# Patient Record
Sex: FEMALE | Race: BLACK OR AFRICAN AMERICAN | NOT HISPANIC OR LATINO | Employment: OTHER | URBAN - METROPOLITAN AREA
[De-identification: names, ages, dates, MRNs, and addresses within clinical notes are randomized per-mention and may not be internally consistent; named-entity substitution may affect disease eponyms.]

---

## 2017-03-14 ENCOUNTER — HOSPITAL ENCOUNTER (EMERGENCY)
Facility: HOSPITAL | Age: 62
Discharge: HOME | End: 2017-03-14
Attending: EMERGENCY MEDICINE
Payer: COMMERCIAL

## 2017-03-14 VITALS
DIASTOLIC BLOOD PRESSURE: 67 MMHG | OXYGEN SATURATION: 100 % | BODY MASS INDEX: 21.19 KG/M2 | WEIGHT: 135 LBS | HEIGHT: 67 IN | RESPIRATION RATE: 16 BRPM | SYSTOLIC BLOOD PRESSURE: 133 MMHG | HEART RATE: 70 BPM | TEMPERATURE: 98 F

## 2017-03-14 DIAGNOSIS — K21.9 GASTROESOPHAGEAL REFLUX DISEASE, ESOPHAGITIS PRESENCE NOT SPECIFIED: ICD-10-CM

## 2017-03-14 DIAGNOSIS — R07.89 ATYPICAL CHEST PAIN: Primary | ICD-10-CM

## 2017-03-14 LAB
ALBUMIN SERPL BCP-MCNC: 4.1 G/DL
ALP SERPL-CCNC: 87 U/L
ALT SERPL W/O P-5'-P-CCNC: 9 U/L
ANION GAP SERPL CALC-SCNC: 11 MMOL/L
AST SERPL-CCNC: 13 U/L
BASOPHILS # BLD AUTO: 0.01 K/UL
BASOPHILS NFR BLD: 0.2 %
BILIRUB SERPL-MCNC: 0.5 MG/DL
BNP SERPL-MCNC: 30 PG/ML
BUN SERPL-MCNC: 19 MG/DL
CALCIUM SERPL-MCNC: 10.1 MG/DL
CHLORIDE SERPL-SCNC: 106 MMOL/L
CO2 SERPL-SCNC: 23 MMOL/L
CREAT SERPL-MCNC: 1.1 MG/DL
DIFFERENTIAL METHOD: ABNORMAL
EOSINOPHIL # BLD AUTO: 0.1 K/UL
EOSINOPHIL NFR BLD: 1.1 %
ERYTHROCYTE [DISTWIDTH] IN BLOOD BY AUTOMATED COUNT: 12.9 %
EST. GFR  (AFRICAN AMERICAN): >60 ML/MIN/1.73 M^2
EST. GFR  (NON AFRICAN AMERICAN): 54 ML/MIN/1.73 M^2
GLUCOSE SERPL-MCNC: 209 MG/DL
HCT VFR BLD AUTO: 31 %
HGB BLD-MCNC: 10 G/DL
INR PPP: 1
LYMPHOCYTES # BLD AUTO: 2 K/UL
LYMPHOCYTES NFR BLD: 32.5 %
MCH RBC QN AUTO: 27 PG
MCHC RBC AUTO-ENTMCNC: 32.3 %
MCV RBC AUTO: 84 FL
MONOCYTES # BLD AUTO: 0.5 K/UL
MONOCYTES NFR BLD: 8.2 %
NEUTROPHILS # BLD AUTO: 3.6 K/UL
NEUTROPHILS NFR BLD: 58 %
PLATELET # BLD AUTO: 310 K/UL
PMV BLD AUTO: 9.7 FL
POTASSIUM SERPL-SCNC: 4.2 MMOL/L
PROT SERPL-MCNC: 7.9 G/DL
PROTHROMBIN TIME: 10.3 SEC
RBC # BLD AUTO: 3.71 M/UL
SODIUM SERPL-SCNC: 140 MMOL/L
TROPONIN I SERPL DL<=0.01 NG/ML-MCNC: 0.01 NG/ML
WBC # BLD AUTO: 6.13 K/UL

## 2017-03-14 PROCEDURE — 63600175 PHARM REV CODE 636 W HCPCS: Performed by: EMERGENCY MEDICINE

## 2017-03-14 PROCEDURE — 25000003 PHARM REV CODE 250: Performed by: EMERGENCY MEDICINE

## 2017-03-14 PROCEDURE — 96374 THER/PROPH/DIAG INJ IV PUSH: CPT

## 2017-03-14 PROCEDURE — 85025 COMPLETE CBC W/AUTO DIFF WBC: CPT

## 2017-03-14 PROCEDURE — 93005 ELECTROCARDIOGRAM TRACING: CPT

## 2017-03-14 PROCEDURE — 99284 EMERGENCY DEPT VISIT MOD MDM: CPT | Mod: 25

## 2017-03-14 PROCEDURE — C9113 INJ PANTOPRAZOLE SODIUM, VIA: HCPCS | Performed by: EMERGENCY MEDICINE

## 2017-03-14 PROCEDURE — 85610 PROTHROMBIN TIME: CPT

## 2017-03-14 PROCEDURE — 83880 ASSAY OF NATRIURETIC PEPTIDE: CPT

## 2017-03-14 PROCEDURE — 84484 ASSAY OF TROPONIN QUANT: CPT

## 2017-03-14 PROCEDURE — 80053 COMPREHEN METABOLIC PANEL: CPT

## 2017-03-14 RX ORDER — LOSARTAN POTASSIUM 25 MG/1
100 TABLET ORAL
Status: COMPLETED | OUTPATIENT
Start: 2017-03-14 | End: 2017-03-14

## 2017-03-14 RX ORDER — PANTOPRAZOLE SODIUM 40 MG/1
40 TABLET, DELAYED RELEASE ORAL DAILY
Qty: 30 TABLET | Refills: 0 | Status: SHIPPED | OUTPATIENT
Start: 2017-03-14 | End: 2017-08-21 | Stop reason: ALTCHOICE

## 2017-03-14 RX ORDER — PANTOPRAZOLE SODIUM 40 MG/10ML
40 INJECTION, POWDER, LYOPHILIZED, FOR SOLUTION INTRAVENOUS
Status: COMPLETED | OUTPATIENT
Start: 2017-03-14 | End: 2017-03-14

## 2017-03-14 RX ORDER — ASPIRIN 325 MG
325 TABLET ORAL
Status: COMPLETED | OUTPATIENT
Start: 2017-03-14 | End: 2017-03-14

## 2017-03-14 RX ADMIN — ASPIRIN 325 MG ORAL TABLET 325 MG: 325 PILL ORAL at 02:03

## 2017-03-14 RX ADMIN — LOSARTAN POTASSIUM 100 MG: 25 TABLET, FILM COATED ORAL at 03:03

## 2017-03-14 RX ADMIN — LIDOCAINE HYDROCHLORIDE: 20 SOLUTION ORAL; TOPICAL at 02:03

## 2017-03-14 RX ADMIN — PANTOPRAZOLE SODIUM 40 MG: 40 INJECTION, POWDER, FOR SOLUTION INTRAVENOUS at 02:03

## 2017-03-14 NOTE — ED PROVIDER NOTES
Encounter Date: 3/14/2017    SCRIBE #1 NOTE: I, Marilyn Euceda, am scribing for, and in the presence of,  Marixa Guerra MD. I have scribed the following portions of the note - Other sections scribed: HPI/ROS/PE.       History     Chief Complaint   Patient presents with    Chest Pain     Patient states been having the center of her chest with pressure,x2days relieved by vomiting now also complaining of tightness in the center of her chest.Denies shortness of breath.     Review of patient's allergies indicates:  No Known Allergies  HPI Comments: CC: Chest Pain    HPI: 62 y.o. F with DM, HTN, and carotid artery bruit presents to the ED c/o intermittent, moderate non-radiating mid-sternal CP with associated epigastric pain and belching for the past 2 days. Pain is relieved with OTC ranitidine and belching. Pt saw her cardiologist last week for a similar complaint and was told that it seemed like GERD. However, her doctor did not start her on any GERD medications. Pt denies SOB, cough, nausea, emesis, and any other symptoms.     The history is provided by the patient.     Past Medical History:   Diagnosis Date    Carotid artery bruit     Diabetes mellitus     High cholesterol     Hypertension      History reviewed. No pertinent surgical history.  History reviewed. No pertinent family history.  Social History   Substance Use Topics    Smoking status: Never Smoker    Smokeless tobacco: None    Alcohol use No     Review of Systems   Constitutional: Negative for fever.   HENT: Negative for congestion and sore throat.    Eyes: Negative for pain and visual disturbance.   Respiratory: Negative for cough and shortness of breath.    Cardiovascular: Positive for chest pain.   Gastrointestinal: Positive for abdominal pain. Negative for nausea and vomiting.        (+) belching   Genitourinary: Negative for dysuria.   Musculoskeletal: Negative for back pain.   Skin: Negative for rash.   Neurological: Negative for headaches.        Physical Exam   Initial Vitals   BP Pulse Resp Temp SpO2   03/14/17 0031 03/14/17 0031 03/14/17 0031 03/14/17 0031 03/14/17 0031   197/102 81 16 98.2 °F (36.8 °C) 99 %     Physical Exam    Nursing note and vitals reviewed.  Constitutional: She appears well-developed and well-nourished. She is active.   HENT:   Head: Normocephalic and atraumatic.   Eyes: EOM are normal.   Neck: Neck supple.   Cardiovascular: Normal rate, regular rhythm and normal heart sounds.   Pulmonary/Chest: Effort normal and breath sounds normal. No respiratory distress.   Abdominal: Soft. Normal appearance. She exhibits no distension. There is tenderness in the epigastric area. There is no rebound and no guarding.   Musculoskeletal: Normal range of motion.   Neurological: She is alert and oriented to person, place, and time.   Skin: Skin is warm and dry. No rash noted.   Psychiatric: She has a normal mood and affect.         ED Course   Procedures  Labs Reviewed   CBC W/ AUTO DIFFERENTIAL - Abnormal; Notable for the following:        Result Value    RBC 3.71 (*)     Hemoglobin 10.0 (*)     Hematocrit 31.0 (*)     All other components within normal limits   COMPREHENSIVE METABOLIC PANEL - Abnormal; Notable for the following:     Glucose 209 (*)     ALT 9 (*)     eGFR if non  54 (*)     All other components within normal limits   PROTIME-INR   B-TYPE NATRIURETIC PEPTIDE   TROPONIN I     EKG Readings: (Independently Interpreted)   Initial Reading: No STEMI. Previous EKG: Compared with most recent EKG Previous EKG Date: 09/16/16. Heart Rate: 82. Ectopy: No Ectopy.          Medical Decision Making:   History:   Old Medical Records: I decided to obtain old medical records.  Initial Assessment:   This is an emergent evaluation of a 62-year-old woman who presented to the emergency department secondary to midsternal chest pain, epigastric pain, and belching for the past 2 days.  Differential Diagnosis:   Differential diagnoses  include ACS, pulmonary embolus, pneumothorax, costochondritis, musculoskeletal strain vs sprain, GERD.   Independently Interpreted Test(s):   I have ordered and independently interpreted X-rays - see prior notes.  I have ordered and independently interpreted EKG Reading(s) - see prior notes  Clinical Tests:   Lab Tests: Ordered and Reviewed  The following lab test(s) were unremarkable: CBC, CMP and Troponin  Radiological Study: Ordered and Reviewed  Medical Tests: Ordered and Reviewed  ED Management:  On physical exam, pt is in no acute distress. There is no reproducible TTP of chest wall. There are no vesicular lesions. Heart sounds are within normal limits without murmurs, rhonchi, or rales. Lungs are CTAB. EKG and CXR were unremarkable. Troponin and BNP were wnl and labs noncontributory.  Patient was treated in the emergency department with aspirin, pantoprazole, a GI cocktail, then losartan for her elevated blood pressure as she had not taken her blood pressure medicines today.  She reported complete resolution of her symptoms.  Given an unremarkable EKG and chest x-ray as well as negative troponin and BNP as well as complete resolution of her symptoms, I doubt an acute cardiac etiology for these symptoms.  Patient has close follow-up with cardiology and has been advised to follow with them regarding today's presentation to the emergency department.  She's been given strict chest pain return precautions.    Marixa Guerra MD  0051 AM  3/14/2017                  Scribe Attestation:   Scribe #1: I performed the above scribed service and the documentation accurately describes the services I performed. I attest to the accuracy of the note.    Attending Attestation:           Physician Attestation for Scribe:  Physician Attestation Statement for Scribe #1: I, Marixa Guerra MD, reviewed documentation, as scribed by Marilyn Euceda in my presence, and it is both accurate and complete.                 ED Course     Clinical  Impression:   The primary encounter diagnosis was Atypical chest pain. A diagnosis of Gastroesophageal reflux disease, esophagitis presence not specified was also pertinent to this visit.    Disposition:   Disposition: Discharged  Condition: Stable       Marixa Guerra MD  03/30/17 1401

## 2017-03-14 NOTE — DISCHARGE INSTRUCTIONS

## 2017-03-14 NOTE — ED AVS SNAPSHOT
[facility]  [address] [place] [address]               Julieth GROVER HahnMeli   3/14/2017  1:38 AM   ED    Description:  Female : 10/30/1954   Department:  [facility]           Your Care was Coordinated By:     Provider Role From To    Marixa Guerra MD Attending Provider 17 0142 --      Reason for Visit     Chest Pain           Diagnoses this Visit        Comments    Atypical chest pain    -  Primary     Gastroesophageal reflux disease, esophagitis presence not specified           ED Disposition     ED Disposition Condition Comment    Discharge             To Do List           Follow-up Information     Follow up with Derrick Johnston MD. Schedule an appointment as soon as possible for a visit in 2 days.    Specialty:  Family Medicine    Contact information:    [address] [id number] [phone]          Schedule an appointment as soon as possible for a visit with Gregory Mercedes MD.    Specialty:  Gastroenterology    Contact information:    [address] [place] [address]  [id number]  [facility]  [facility] [address]  [phone]         These Medications        Disp Refills Start End    pantoprazole (PROTONIX) 40 MG tablet 30 tablet 0 3/14/2017 3/14/2018    Take 1 tablet (40 mg total) by mouth once daily. - Oral    Pharmacy: [facility]/pharmacy #[id number] - [place] - [address]. Ph #: [phone]         [facility]     [facility] -  Assistance  Registered nurses in the [facility] provide clinical advisement, health education, appointment booking, and other advisory services.  Call for this free service at [phone].             Medications           Message regarding Medications     Verify the changes and/or additions to your medication regime listed below are the same as discussed with your clinician today.  If any of these changes or  additions are incorrect, please notify your healthcare provider.        START taking these NEW medications        Refills    pantoprazole (PROTONIX) 40 MG tablet 0    Sig: Take 1 tablet (40 mg total) by mouth once daily.    Class: Print    Route: Oral      These medications were administered today        Dose Freq    aspirin tablet 325 mg 325 mg ED 1 Time    Sig: Take 1 tablet (325 mg total) by mouth ED 1 Time.    Class: Normal    Route: Oral    (pyxis) gi cocktail (mylanta 30 mL, lidocaine 2 % viscous 10 mL, dicyclomine 10 mL) 50 mL  ED 1 Time    Sig: Take by mouth ED 1 Time.    Class: Normal    Route: Oral    pantoprazole injection 40 mg 40 mg ED 1 Time    Sig: Inject 40 mg into the vein ED 1 Time.    Class: Normal    Route: Intravenous    losartan tablet 100 mg 100 mg ED 1 Time    Sig: Take 4 tablets (100 mg total) by mouth ED 1 Time.    Class: Normal    Route: Oral           Verify that the below list of medications is an accurate representation of the medications you are currently taking.  If none reported, the list may be blank. If incorrect, please contact your healthcare provider. Carry this list with you in case of emergency.           Current Medications     aspirin (ECOTRIN) 81 MG EC tablet Take 81 mg by mouth once daily.    atorvastatin (LIPITOR) 10 MG tablet Take 1 tablet (10 mg total) by mouth once daily.    insulin detemir (LEVEMIR) 100 unit/mL injection Inject 17 Units into the skin every evening.     losartan (COZAAR) 25 MG tablet Take 1 tablet (25 mg total) by mouth once daily.    sitagliptan-metformin (JANUMET) 50-1,000 mg per tablet Take 1 tablet by mouth 2 (two) times daily with meals.     aspirin tablet 325 mg Take 1 tablet (325 mg total) by mouth ED 1 Time.    losartan tablet 100 mg Take 4 tablets (100 mg total) by mouth ED 1 Time.    pantoprazole (PROTONIX) 40 MG tablet Take 1 tablet (40 mg total) by mouth once daily.    pantoprazole injection 40 mg Inject 40 mg into the vein ED 1 Time.     "       Clinical Reference Information           Your Vitals Were     BP Pulse Temp Resp Height Weight    166/78 70 98.2 °F (36.8 °C) (Oral) 16 5' 7" (1.702 m) 61.2 kg (135 lb)    SpO2 BMI             99% 21.14 kg/m2         Allergies as of 3/14/2017     No Known Allergies      Immunizations Administered on Date of Encounter - 3/14/2017     None      ED Micro, Lab, POCT     Start Ordered       Status Ordering Provider    03/14/17 0302 03/14/17 0301  Troponin I  STAT      Final result     03/14/17 0052 03/14/17 0051  CBC auto differential  STAT      Final result     03/14/17 0052 03/14/17 0051  Comprehensive metabolic panel  STAT      Final result     03/14/17 0052 03/14/17 0051  Protime-INR  STAT      Final result     03/14/17 0052 03/14/17 0051    Now then every 3 hours,   Status:  Canceled     Comments:  PLEASE REVIEW ORDER START TIME BEFORE MARKING SPECIMEN COLLECTED.   Start Status   03/14/17 0052 Canceled   03/14/17 0352 Canceled       Canceled     03/14/17 0052 03/14/17 0051  B-Type natriuretic peptide (BNP)  STAT      Final result       ED Imaging Orders     Start Ordered       Status Ordering Provider    03/14/17 0052 03/14/17 0051  X-Ray Chest PA And Lateral  1 time imaging      Final result         Discharge Instructions         Uncertain Causes of Chest Pain    Chest pain can happen for a number of reasons. Sometimes the cause can't be determined. If your condition does not seem serious, and your pain does not appear to be coming from your heart, your healthcare provider may recommend watching it closely. Sometimes the signs of a serious problem take more time to appear. Many problems not related to your heart can cause chest pain.These include:  · Musculoskeletal. Costochondritis, an inflammation of the tissues around the ribs that can occur from trauma or overuse injuries  · Respiratory. Pneumonia, pneumothorax, or pneumonitis (inflammation of the lining of the chest and lungs)  · Gastrointestinal. " Esophageal reflux, heartburn, or gallbladder disease  · Anxiety and panic disorders  · Nerve compression and neuritis  · Miscellaneous problems such as aortic aneurysm or pulmonary embolism (a blood clot in the lungs)  Home care  After your visit, follow these recommendations:  · Rest today and avoid strenuous activity.  · Take any prescribed medicine as directed.  · Be aware of any recurrent chest pain and notice any changes  Follow-up care  Follow up with your healthcare provider if you do not start to feel better within 24 hours, or as advised.  Call 911  Call 911 if any of these occur:  · A change in the type of pain: if it feels different, becomes more severe, lasts longer, or begins to spread into your shoulder, arm, neck, jaw or back  · Shortness of breath or increased pain with breathing  · Weakness, dizziness, or fainting  · Rapid heart beat  · Crushing sensation in your chest  When to seek medical advice  Call your healthcare provider right away if any of the following occur:  · Cough with dark colored sputum (phlegm) or blood  · Fever of 100.4ºF (38ºC) or higher, or as directed by your healthcare provider  · Swelling, pain or redness in one leg  · Shortness of breath  Date Last Reviewed: 12/30/2015  © 9581-1472 [employer]. [address] [place] [address], [place] [address]. All rights reserved. This information is not intended as a substitute for professional medical care. Always follow your healthcare professional's instructions.          Your Scheduled Appointments     Mar 23, 2017 11:00 AM CDT   Stress Echo with ECHOES/STRESS ECHOES - VOR   [facility] IGOR PERRY M.D. [address] [address]   [phone]            Sep 06, 2017 11:30 AM CDT   Follow Up with Igor Perry MD   [facility] IGOR PERRY M.D. [address] [address] [phone]              [facility] Sign-Up      Activating your [facility] account is as easy as 1-2-3!     1) Visit [email or web address], select Sign Up Now, enter this activation code and your date of birth, then select Next.  [email or web address]  Expires: 4/20/2017  2:56 PM      2) Create a username and password to use when you visit [facility] in the future and select a security question in case you lose your password and select Next.    3) Enter your e-mail address and click Sign Up!    Additional Information  If you have questions, please e-mail [email or web address] or call [phone] to talk to our [facility] staff. Remember, [facility] is NOT to be used for urgent needs. For medical emergencies, dial 911.          [facility] complies with applicable Federal civil rights laws and does not discriminate on the basis of race, color, national origin, age, disability, or sex.        Language Assistance Services     ATTENTION: Language assistance services are available, free of charge. Please call [phone].      ATENCIÓN: Si habla español, tiene a garcia disposición servicios gratuitos de asistencia lingüística. Llame al [phone].     CHÚ Ý: N?u b?n nói Ti?ng Vi?t, có các d?ch v? h? tr? ngôn ng? mi?n phí dành cho b?n. G?i s? [phone].

## 2017-03-14 NOTE — ED TRIAGE NOTES
Patient is having midsternal chest pain for about one week intermittently. Patient states that the chest pain radiates down her left arm. Pain 7/10. Patient states that she has been dry heaving.

## 2017-07-10 ENCOUNTER — HOSPITAL ENCOUNTER (EMERGENCY)
Facility: HOSPITAL | Age: 62
Discharge: HOME | End: 2017-07-11
Attending: EMERGENCY MEDICINE
Payer: COMMERCIAL

## 2017-07-10 DIAGNOSIS — R07.89 ATYPICAL CHEST PAIN: Primary | ICD-10-CM

## 2017-07-10 DIAGNOSIS — R10.9 RIGHT FLANK PAIN: ICD-10-CM

## 2017-07-10 LAB
ALBUMIN SERPL BCP-MCNC: 4.1 G/DL
ALP SERPL-CCNC: 78 U/L
ALT SERPL W/O P-5'-P-CCNC: 14 U/L
ANION GAP SERPL CALC-SCNC: 12 MMOL/L
AST SERPL-CCNC: 18 U/L
BACTERIA #/AREA URNS HPF: NORMAL /HPF
BASOPHILS # BLD AUTO: 0.01 K/UL
BASOPHILS NFR BLD: 0.2 %
BILIRUB SERPL-MCNC: 0.5 MG/DL
BILIRUB UR QL STRIP: NEGATIVE
BNP SERPL-MCNC: 99 PG/ML
BUN SERPL-MCNC: 16 MG/DL
CALCIUM SERPL-MCNC: 9.9 MG/DL
CHLORIDE SERPL-SCNC: 104 MMOL/L
CLARITY UR: CLEAR
CO2 SERPL-SCNC: 24 MMOL/L
COLOR UR: COLORLESS
CREAT SERPL-MCNC: 1.1 MG/DL
DIFFERENTIAL METHOD: ABNORMAL
EOSINOPHIL # BLD AUTO: 0 K/UL
EOSINOPHIL NFR BLD: 0.7 %
ERYTHROCYTE [DISTWIDTH] IN BLOOD BY AUTOMATED COUNT: 12.7 %
EST. GFR  (AFRICAN AMERICAN): >60 ML/MIN/1.73 M^2
EST. GFR  (NON AFRICAN AMERICAN): 54 ML/MIN/1.73 M^2
GLUCOSE SERPL-MCNC: 284 MG/DL
GLUCOSE UR QL STRIP: ABNORMAL
HCT VFR BLD AUTO: 31.7 %
HGB BLD-MCNC: 10 G/DL
HGB UR QL STRIP: ABNORMAL
INR PPP: 1
KETONES UR QL STRIP: NEGATIVE
LEUKOCYTE ESTERASE UR QL STRIP: NEGATIVE
LYMPHOCYTES # BLD AUTO: 2.2 K/UL
LYMPHOCYTES NFR BLD: 35.4 %
MCH RBC QN AUTO: 27 PG
MCHC RBC AUTO-ENTMCNC: 31.5 %
MCV RBC AUTO: 85 FL
MICROSCOPIC COMMENT: NORMAL
MONOCYTES # BLD AUTO: 0.5 K/UL
MONOCYTES NFR BLD: 7.4 %
NEUTROPHILS # BLD AUTO: 3.4 K/UL
NEUTROPHILS NFR BLD: 56 %
NITRITE UR QL STRIP: NEGATIVE
PH UR STRIP: 7 [PH] (ref 5–8)
PLATELET # BLD AUTO: 311 K/UL
PMV BLD AUTO: 9.5 FL
POTASSIUM SERPL-SCNC: 4 MMOL/L
PROT SERPL-MCNC: 8 G/DL
PROT UR QL STRIP: NEGATIVE
PROTHROMBIN TIME: 10.3 SEC
RBC # BLD AUTO: 3.71 M/UL
RBC #/AREA URNS HPF: 1 /HPF (ref 0–4)
SODIUM SERPL-SCNC: 140 MMOL/L
SP GR UR STRIP: 1 (ref 1–1.03)
TROPONIN I SERPL DL<=0.01 NG/ML-MCNC: <0.006 NG/ML
URN SPEC COLLECT METH UR: ABNORMAL
UROBILINOGEN UR STRIP-ACNC: NEGATIVE EU/DL
WBC # BLD AUTO: 6.11 K/UL
YEAST URNS QL MICRO: NORMAL

## 2017-07-10 PROCEDURE — 83880 ASSAY OF NATRIURETIC PEPTIDE: CPT

## 2017-07-10 PROCEDURE — 81000 URINALYSIS NONAUTO W/SCOPE: CPT

## 2017-07-10 PROCEDURE — 85610 PROTHROMBIN TIME: CPT

## 2017-07-10 PROCEDURE — 93005 ELECTROCARDIOGRAM TRACING: CPT

## 2017-07-10 PROCEDURE — 80053 COMPREHEN METABOLIC PANEL: CPT

## 2017-07-10 PROCEDURE — 85025 COMPLETE CBC W/AUTO DIFF WBC: CPT

## 2017-07-10 PROCEDURE — 84484 ASSAY OF TROPONIN QUANT: CPT

## 2017-07-10 PROCEDURE — 99284 EMERGENCY DEPT VISIT MOD MDM: CPT

## 2017-07-10 RX ORDER — OMEPRAZOLE 20 MG/1
20 CAPSULE, DELAYED RELEASE ORAL DAILY
Qty: 30 CAPSULE | Refills: 0 | Status: SHIPPED | OUTPATIENT
Start: 2017-07-10 | End: 2017-08-21 | Stop reason: ALTCHOICE

## 2017-07-10 RX ORDER — ASPIRIN 81 MG/1
81 TABLET ORAL DAILY
COMMUNITY
End: 2017-08-21 | Stop reason: SDUPTHER

## 2017-07-10 RX ORDER — LOSARTAN POTASSIUM 100 MG/1
100 TABLET ORAL DAILY
COMMUNITY
End: 2018-02-06 | Stop reason: SDUPTHER

## 2017-07-10 RX ORDER — ATORVASTATIN CALCIUM 10 MG/1
10 TABLET, FILM COATED ORAL DAILY
COMMUNITY
End: 2017-08-21 | Stop reason: SDUPTHER

## 2017-07-10 RX ORDER — METHOCARBAMOL 500 MG/1
1000 TABLET, FILM COATED ORAL 3 TIMES DAILY
Qty: 30 TABLET | Refills: 0 | Status: SHIPPED | OUTPATIENT
Start: 2017-07-10 | End: 2017-07-15

## 2017-07-11 VITALS
HEIGHT: 66 IN | DIASTOLIC BLOOD PRESSURE: 82 MMHG | WEIGHT: 137 LBS | RESPIRATION RATE: 14 BRPM | TEMPERATURE: 98 F | OXYGEN SATURATION: 100 % | SYSTOLIC BLOOD PRESSURE: 168 MMHG | BODY MASS INDEX: 22.02 KG/M2 | HEART RATE: 72 BPM

## 2017-07-11 NOTE — ED NOTES
"Received report from Cinthya MAY. 1st contact with pt. Pt AAO x 3, REU, skin warm, dry and intact. Side rails up x 2, bed in low position, wheels locked. Call bell within reach. Pt c/o chespt pain but tolerable. Pt current pain level 8/10. Pt BP is 205/91. Notified Reynaldo YEUNG. "I forgot to take my BP meds today". Pt is taking BP meds at bedside per MD order  "

## 2017-07-11 NOTE — ED TRIAGE NOTES
"Patient reports to ED with sister. Pt reports chest midsternal chest pain that "feels like tightness" and radiates to back X 1 week. Patient also reports nausea and vomiting.patient states that  Pain is relieved when she takes otc zantac.  "

## 2017-07-11 NOTE — ED PROVIDER NOTES
"Encounter Date: 7/10/2017    SCRIBE #1 NOTE: I, Katina Hansen , am scribing for, and in the presence of,  Gregory Jacobs MD . I have scribed the following portions of the note - Other sections scribed: HPI/ROS .       History     Chief Complaint   Patient presents with    Chest Pain     "I am having tightness in my chest that is radiating to my back. It is making me feel like I want to vomit. This has been going on for a week."     CC: Chest Pain     HPI: This 62 y.o. female presents to the ED c/o a 1-week hx of acute-onset, intermittent midsternal chest pain that radiates through the back with associated nausea and vomiting. Pt describes her chest pain as a "tightness." Most recent episode of chest pain occurred while watching TV just PTA. Pt reports attempted tx with "off-brand" Zantac which temporarily resolves her pain. Pt does not have chest pain currently. She reports being evaluated at this facility for similar chest pain in March 2017. She also reports having an normal stress test in March 2017 by her cardiologist, Dr. Perry. She reports an upcoming cardiologist appointment in September (2 months). Pt states last week, she attempted to make an appointment with her GI doctor, but was told he is out of the office. Pt denies fever and abdominal pain. Of note, pt forgot to take her HTN medication today.     Additionally, pt reports a 2-month hx of stabbing R flank pain. She otherwise denies dysuria, urinary frequency, urinary urgency, hematuria, and any other associated symptoms.         The history is provided by the patient. No  was used.     Review of patient's allergies indicates:  No Known Allergies  History reviewed. No pertinent past medical history.  History reviewed. No pertinent surgical history.  History reviewed. No pertinent family history.  Social History   Substance Use Topics    Smoking status: Never Smoker    Smokeless tobacco: Never Used    Alcohol use No "     Review of Systems   Constitutional: Negative for chills and fever.   HENT: Negative for congestion.    Eyes: Negative for visual disturbance.   Respiratory: Negative for cough and shortness of breath.    Cardiovascular: Positive for chest pain (midsternal ).   Gastrointestinal: Positive for nausea and vomiting. Negative for abdominal pain, constipation and diarrhea.   Genitourinary: Positive for flank pain (R side). Negative for dysuria, frequency, hematuria, urgency, vaginal bleeding and vaginal discharge.   Musculoskeletal: Negative for back pain and neck pain.   Skin: Negative for rash and wound.   Neurological: Negative for headaches.       Physical Exam     Initial Vitals [07/10/17 2129]   BP Pulse Resp Temp SpO2   (!) 239/107 92 16 98.6 °F (37 °C) 100 %      MAP       151         Physical Exam    Nursing note and vitals reviewed.  HENT:   Head: Atraumatic.   Eyes: Conjunctivae and EOM are normal.   Neck: Normal range of motion.   Cardiovascular: Exam reveals no gallop and no friction rub.    No murmur heard.  Pulmonary/Chest: Breath sounds normal. No respiratory distress.   Abdominal: Soft. There is no tenderness.   Musculoskeletal: Normal range of motion. She exhibits no edema.   Neurological: She is alert and oriented to person, place, and time. She has normal strength. No cranial nerve deficit or sensory deficit.   Psychiatric: She has a normal mood and affect.         ED Course   Procedures  Labs Reviewed   CBC W/ AUTO DIFFERENTIAL - Abnormal; Notable for the following:        Result Value    RBC 3.71 (*)     Hemoglobin 10.0 (*)     Hematocrit 31.7 (*)     MCHC 31.5 (*)     All other components within normal limits   COMPREHENSIVE METABOLIC PANEL - Abnormal; Notable for the following:     Glucose 284 (*)     eGFR if non  54 (*)     All other components within normal limits   URINALYSIS - Abnormal; Notable for the following:     Color, UA Colorless (*)     Specific Girard, UA 1.000 (*)      Glucose, UA 3+ (*)     Occult Blood UA 1+ (*)     All other components within normal limits   B-TYPE NATRIURETIC PEPTIDE   TROPONIN I   PROTIME-INR   URINALYSIS MICROSCOPIC             Medical Decision Making:   Initial Assessment:   62-year-old female presenting with one week of intermittent chest pain described as midsternal radiating to the back.  Patient was seen in March for similar presentation and had a negative workup and pain relieved with GI cocktail.  She also had a stress test done in March which was reportedly normal.  She currently has no pain.  Vital signs notable for hypertension.  She says she did not take her blood pressure medicine today.  No reproducible tenderness.  Lungs are clear.  EKG reviewed and interpreted myself shows no evidence of acute ischemia.  Chest x-ray reviewed and interpreted myself shows no evidence of pneumothorax, pneumonia or pulmonary edema.  Labs including troponin are grossly unremarkable.  I doubt acute MI.  I doubt pulmonary embolism.  Suspect his symptoms are secondary to GI related condition.  She also reports a two-month history of right flank pain.  No history of trauma.  No new symptoms.  Urinalysis unremarkable.  I think this patient is safe and stable for discharge.  Primary care follow-up.  Return instructions given.  Patient verbalized understanding and agreement with the plan.            Scribe Attestation:   Scribe #1: I performed the above scribed service and the documentation accurately describes the services I performed. I attest to the accuracy of the note.    Attending Attestation:           Physician Attestation for Scribe:  Physician Attestation Statement for Scribe #1: I, Gregory Jacobs MD , reviewed documentation, as scribed by Katina Hansen  in my presence, and it is both accurate and complete.                 ED Course     Clinical Impression:   The primary encounter diagnosis was Atypical chest pain. A diagnosis of Right flank pain was  also pertinent to this visit.                           Gregory Jacobs MD  07/11/17 0001

## 2018-01-31 ENCOUNTER — OFFICE VISIT (OUTPATIENT)
Dept: CARDIOLOGY | Facility: CLINIC | Age: 63
End: 2018-01-31
Payer: COMMERCIAL

## 2018-01-31 VITALS
HEIGHT: 66 IN | DIASTOLIC BLOOD PRESSURE: 88 MMHG | BODY MASS INDEX: 24.11 KG/M2 | HEART RATE: 80 BPM | SYSTOLIC BLOOD PRESSURE: 122 MMHG | WEIGHT: 150 LBS

## 2018-01-31 DIAGNOSIS — E78.5 HYPERLIPIDEMIA, UNSPECIFIED HYPERLIPIDEMIA TYPE: ICD-10-CM

## 2018-01-31 DIAGNOSIS — I10 ESSENTIAL HYPERTENSION: Primary | ICD-10-CM

## 2018-01-31 DIAGNOSIS — D64.9 ANEMIA, UNSPECIFIED TYPE: ICD-10-CM

## 2018-01-31 DIAGNOSIS — I67.9 CVD (CEREBROVASCULAR DISEASE): ICD-10-CM

## 2018-01-31 DIAGNOSIS — E10.3599 TYPE 1 DIABETES MELLITUS WITH PROLIFERATIVE RETINOPATHY, MACULAR EDEMA PRESENCE UNSPECIFIED, UNSPECIFIED LATERALITY: ICD-10-CM

## 2018-01-31 PROCEDURE — 3008F BODY MASS INDEX DOCD: CPT | Mod: S$GLB,,, | Performed by: INTERNAL MEDICINE

## 2018-01-31 PROCEDURE — 93000 ELECTROCARDIOGRAM COMPLETE: CPT | Mod: S$GLB,,, | Performed by: INTERNAL MEDICINE

## 2018-01-31 PROCEDURE — 99213 OFFICE O/P EST LOW 20 MIN: CPT | Mod: S$GLB,,, | Performed by: INTERNAL MEDICINE

## 2018-01-31 RX ORDER — PANTOPRAZOLE SODIUM 40 MG/1
40 TABLET, DELAYED RELEASE ORAL DAILY
COMMUNITY
Start: 2017-11-29 | End: 2021-07-02

## 2018-01-31 NOTE — PROGRESS NOTES
Subjective:      Patient ID: Julieth Johnson is a 63 y.o. female.    Chief Complaint: Follow-up (Hypertension)    HPI:       Pt c/o mild left upper arm pains and left jaw pain which lasts 5 seconds and usually occur while sitting.  Pt also c/o pains in the back of her neck.  The left upper arm and left jaw discomfort occurs nearly at the same time.   Pt walks 2 miles without difficulty.  Pt walks on treadmill without difficulty.    Review of Systems   Cardiovascular: Negative for chest pain, claudication, dyspnea on exertion, irregular heartbeat, leg swelling, near-syncope, orthopnea, palpitations and syncope.      Last HgbA1C 6 or 7    Hgb 10 las year  Past Medical History:   Diagnosis Date    Carotid artery bruit     Diabetes mellitus     High cholesterol     Hypertension         No past surgical history on file.    No family history on file.    Social History     Social History    Marital status:      Spouse name: N/A    Number of children: N/A    Years of education: N/A     Social History Main Topics    Smoking status: Never Smoker    Smokeless tobacco: Never Used    Alcohol use No    Drug use: No    Sexual activity: Not Asked     Other Topics Concern    None     Social History Narrative    ** Merged History Encounter **            Current Outpatient Prescriptions on File Prior to Visit   Medication Sig Dispense Refill    aspirin (ECOTRIN) 81 MG EC tablet Take 81 mg by mouth once daily.      atorvastatin (LIPITOR) 10 MG tablet Take 1 tablet (10 mg total) by mouth once daily. 90 tablet 3    INSULIN DETEMIR (LEVEMIR SUBQ) Inject 17 Units into the skin.      losartan (COZAAR) 100 MG tablet Take 100 mg by mouth once daily.      sitagliptan-metformin (JANUMET) 50-1,000 mg per tablet Take 1 tablet by mouth 2 (two) times daily with meals.       [DISCONTINUED] ranitidine (ZANTAC) 150 MG capsule Take 150 mg by mouth 2 (two) times daily.       No current facility-administered medications  "on file prior to visit.        Review of patient's allergies indicates:  No Known Allergies  Objective:     Vitals:    01/31/18 1428   BP: 122/88   BP Location: Right arm   Patient Position: Sitting   BP Method: Large (Manual)   Pulse: 80   Weight: 68 kg (150 lb)   Height: 5' 6" (1.676 m)        Physical Exam   Constitutional: She is oriented to person, place, and time. She appears well-developed and well-nourished.   Eyes: No scleral icterus.   Neck: No JVD present. Carotid bruit is present (right).   Cardiovascular: Normal rate and regular rhythm.  Exam reveals no gallop.    No murmur heard.  Pulmonary/Chest: Breath sounds normal.   Musculoskeletal: She exhibits no edema.   Neurological: She is alert and oriented to person, place, and time.   Skin: Skin is warm and dry.   Psychiatric: She has a normal mood and affect. Her behavior is normal. Judgment and thought content normal.   Vitals reviewed.     ECG today--NSR with PAC, low T waves.  Wt up 18 lbs    Note stress echo last March was normal  Assessment:     1. Essential hypertension    2. Hyperlipidemia, unspecified hyperlipidemia type    3. CVD (cerebrovascular disease)    4. Type 1 diabetes mellitus with proliferative retinopathy, macular edema presence unspecified, unspecified laterality    5. Anemia, unspecified type      Plan:   Julieth was seen today for follow-up.    Diagnoses and all orders for this visit:    Essential hypertension    Hyperlipidemia, unspecified hyperlipidemia type    CVD (cerebrovascular disease)    Type 1 diabetes mellitus with proliferative retinopathy, macular edema presence unspecified, unspecified laterality  -     CBC auto differential; Future  -     Comprehensive metabolic panel; Future  -     Lipid panel; Future  -     TSH; Future  -     Hemoglobin A1c; Future  -     EKG 12-lead  -     Microalbumin/creatinine urine ratio    Anemia, unspecified type  -     Iron and TIBC; Future  -     Ferritin; Future  -     Vitamin B12; " Future  -     Folate RBC; Future       Discussed repeat treadmill stress echo--pt prefers to hold off for now since her left upper arm sensations have diminished.     Will set pt up for an elective evaluation by Dr Black for DOLORES stenosis    Follow-up in about 6 months (around 7/31/2018).

## 2018-02-06 ENCOUNTER — HOSPITAL ENCOUNTER (OUTPATIENT)
Dept: CARDIOLOGY | Facility: CLINIC | Age: 63
Discharge: HOME | End: 2018-02-06
Payer: COMMERCIAL

## 2018-02-06 ENCOUNTER — INITIAL CONSULT (OUTPATIENT)
Dept: CARDIOLOGY | Facility: CLINIC | Age: 63
End: 2018-02-06
Payer: COMMERCIAL

## 2018-02-06 ENCOUNTER — LAB VISIT (OUTPATIENT)
Dept: LAB | Facility: HOSPITAL | Age: 63
End: 2018-02-06
Attending: INTERNAL MEDICINE
Payer: COMMERCIAL

## 2018-02-06 VITALS
HEART RATE: 81 BPM | HEIGHT: 66 IN | DIASTOLIC BLOOD PRESSURE: 77 MMHG | SYSTOLIC BLOOD PRESSURE: 140 MMHG | OXYGEN SATURATION: 96 % | BODY MASS INDEX: 23.88 KG/M2 | WEIGHT: 148.56 LBS

## 2018-02-06 DIAGNOSIS — Z79.4 TYPE 2 DIABETES MELLITUS WITH OTHER SPECIFIED COMPLICATION, WITH LONG-TERM CURRENT USE OF INSULIN: ICD-10-CM

## 2018-02-06 DIAGNOSIS — E78.5 HYPERLIPIDEMIA, UNSPECIFIED HYPERLIPIDEMIA TYPE: ICD-10-CM

## 2018-02-06 DIAGNOSIS — E11.69 TYPE 2 DIABETES MELLITUS WITH OTHER SPECIFIED COMPLICATION, WITH LONG-TERM CURRENT USE OF INSULIN: ICD-10-CM

## 2018-02-06 DIAGNOSIS — I10 ESSENTIAL HYPERTENSION: ICD-10-CM

## 2018-02-06 DIAGNOSIS — E10.3599 TYPE 1 DIABETES MELLITUS WITH PROLIFERATIVE RETINOPATHY, MACULAR EDEMA PRESENCE UNSPECIFIED, UNSPECIFIED LATERALITY: ICD-10-CM

## 2018-02-06 DIAGNOSIS — G45.1 HEMISPHERIC CAROTID ARTERY SYNDROME: ICD-10-CM

## 2018-02-06 DIAGNOSIS — I77.9 RIGHT-SIDED CAROTID ARTERY DISEASE: Primary | ICD-10-CM

## 2018-02-06 DIAGNOSIS — D64.9 ANEMIA, UNSPECIFIED TYPE: ICD-10-CM

## 2018-02-06 LAB
ALBUMIN SERPL BCP-MCNC: 3.6 G/DL
ALP SERPL-CCNC: 74 U/L
ALT SERPL W/O P-5'-P-CCNC: 8 U/L
ANION GAP SERPL CALC-SCNC: 6 MMOL/L
AST SERPL-CCNC: 11 U/L
BASOPHILS # BLD AUTO: 0.02 K/UL
BASOPHILS NFR BLD: 0.4 %
BILIRUB SERPL-MCNC: 0.4 MG/DL
BUN SERPL-MCNC: 23 MG/DL
CALCIUM SERPL-MCNC: 10 MG/DL
CHLORIDE SERPL-SCNC: 110 MMOL/L
CHOLEST SERPL-MCNC: 123 MG/DL
CHOLEST/HDLC SERPL: 2.9 {RATIO}
CO2 SERPL-SCNC: 25 MMOL/L
CREAT SERPL-MCNC: 1.1 MG/DL
DIFFERENTIAL METHOD: ABNORMAL
EOSINOPHIL # BLD AUTO: 0 K/UL
EOSINOPHIL NFR BLD: 0.6 %
ERYTHROCYTE [DISTWIDTH] IN BLOOD BY AUTOMATED COUNT: 12.5 %
EST. GFR  (AFRICAN AMERICAN): >60 ML/MIN/1.73 M^2
EST. GFR  (NON AFRICAN AMERICAN): 53.9 ML/MIN/1.73 M^2
ESTIMATED AVG GLUCOSE: 180 MG/DL
FERRITIN SERPL-MCNC: 146 NG/ML
GLUCOSE SERPL-MCNC: 161 MG/DL
HBA1C MFR BLD HPLC: 7.9 %
HCT VFR BLD AUTO: 29.3 %
HDLC SERPL-MCNC: 43 MG/DL
HDLC SERPL: 35 %
HGB BLD-MCNC: 9 G/DL
IMM GRANULOCYTES # BLD AUTO: 0.02 K/UL
IMM GRANULOCYTES NFR BLD AUTO: 0.4 %
INTERNAL CAROTID STENOSIS: ABNORMAL
IRON SERPL-MCNC: 96 UG/DL
LDLC SERPL CALC-MCNC: 64.8 MG/DL
LYMPHOCYTES # BLD AUTO: 1.7 K/UL
LYMPHOCYTES NFR BLD: 32.9 %
MCH RBC QN AUTO: 26.3 PG
MCHC RBC AUTO-ENTMCNC: 30.7 G/DL
MCV RBC AUTO: 86 FL
MONOCYTES # BLD AUTO: 0.4 K/UL
MONOCYTES NFR BLD: 8.1 %
NEUTROPHILS # BLD AUTO: 3 K/UL
NEUTROPHILS NFR BLD: 57.6 %
NONHDLC SERPL-MCNC: 80 MG/DL
NRBC BLD-RTO: 0 /100 WBC
PLATELET # BLD AUTO: 261 K/UL
PMV BLD AUTO: 10.2 FL
POTASSIUM SERPL-SCNC: 5.6 MMOL/L
PROT SERPL-MCNC: 7.3 G/DL
RBC # BLD AUTO: 3.42 M/UL
SATURATED IRON: 26 %
SODIUM SERPL-SCNC: 141 MMOL/L
TOTAL IRON BINDING CAPACITY: 374 UG/DL
TRANSFERRIN SERPL-MCNC: 253 MG/DL
TRIGL SERPL-MCNC: 76 MG/DL
TSH SERPL DL<=0.005 MIU/L-ACNC: 0.52 UIU/ML
VIT B12 SERPL-MCNC: 286 PG/ML
WBC # BLD AUTO: 5.16 K/UL

## 2018-02-06 PROCEDURE — 80053 COMPREHEN METABOLIC PANEL: CPT

## 2018-02-06 PROCEDURE — 84443 ASSAY THYROID STIM HORMONE: CPT

## 2018-02-06 PROCEDURE — 82607 VITAMIN B-12: CPT

## 2018-02-06 PROCEDURE — 99205 OFFICE O/P NEW HI 60 MIN: CPT | Mod: S$GLB,,, | Performed by: INTERNAL MEDICINE

## 2018-02-06 PROCEDURE — 85025 COMPLETE CBC W/AUTO DIFF WBC: CPT

## 2018-02-06 PROCEDURE — 80061 LIPID PANEL: CPT

## 2018-02-06 PROCEDURE — 82747 ASSAY OF FOLIC ACID RBC: CPT

## 2018-02-06 PROCEDURE — 83540 ASSAY OF IRON: CPT

## 2018-02-06 PROCEDURE — 82728 ASSAY OF FERRITIN: CPT

## 2018-02-06 PROCEDURE — 93880 EXTRACRANIAL BILAT STUDY: CPT | Mod: S$GLB,,, | Performed by: INTERNAL MEDICINE

## 2018-02-06 PROCEDURE — 3008F BODY MASS INDEX DOCD: CPT | Mod: S$GLB,,, | Performed by: INTERNAL MEDICINE

## 2018-02-06 PROCEDURE — 83036 HEMOGLOBIN GLYCOSYLATED A1C: CPT

## 2018-02-06 PROCEDURE — 99999 PR PBB SHADOW E&M-EST. PATIENT-LVL III: CPT | Mod: PBBFAC,,, | Performed by: INTERNAL MEDICINE

## 2018-02-06 NOTE — LETTER
February 6, 2018      Igor Perry MD  [address]  [address]  [facility] [address]           [facility]  [address]  [facility] [address]  Phone: [phone]          Patient: Julieth Johnson   MR Number: [id number]   YOB: 1954   Date of Visit: 2/6/2018       Dear Dr. Igor Perry:    Thank you for referring Julieth Johnson to me for evaluation. Attached you will find relevant portions of my assessment and plan of care.    If you have questions, please do not hesitate to call me. I look forward to following Julieth Johnson along with you.    Sincerely,    Fam Black MD    Enclosure  CC:  No Recipients    If you would like to receive this communication electronically, please contact [email or web address] or [phone] to request more information on [employer] Link access.    For providers and/or their staff who would like to refer a patient to [facility], please contact us through our one-stop-shop provider referral line, [facility], at [phone].    If you feel you have received this communication in error or would no longer like to receive these types of communications, please e-mail [email or web address]

## 2018-02-06 NOTE — PROGRESS NOTES
Subjective:    Patient ID:  Julieth Johnson is a 63 y.o. female who presents for evaluation of Right ICA stenosis.      Referring Physician: Igor Perry MD    HPI   62 yo F with PMHx of HTN, dyslipidemia, DM II on insulin, TIA (left arm sensory loss) referred by Dr. Perry for assessment of DOLORES stenosis.      Pt c/o mild left upper arm pains and left jaw pain which lasts 5 seconds and usually occur while sitting.  Pt also c/o pains in the back of her neck.  The left upper arm and left jaw discomfort occurs nearly at the same time.   Pt walks 2 miles without difficulty.  Pt walks on treadmill without difficulty. She previously also complaint of atypical chest pain for which she underwent exercise stress Echo which showed no ischemia at target HR ( 3/2017).       MRA Neck w and w/o contrast:3/2017    No flow restrictive lesion of origin of innominate, left CCA or left subclavian artery. Left vertebral is larger than right.   Left CCA bifurcation appears normal w/o any plaque  Right CCA bifurcation have 70-80% stenosis which extends into DOLORES and RECA per NASCET criteria.     Review of Systems   Constitution: Negative for chills, decreased appetite, fever, weakness, night sweats, weight gain and weight loss.   HENT: Negative for congestion, hoarse voice, stridor and tinnitus.    Eyes: Negative for blurred vision, pain and visual disturbance.   Cardiovascular: Negative for chest pain, claudication, dyspnea on exertion, irregular heartbeat, leg swelling, near-syncope, orthopnea, palpitations, paroxysmal nocturnal dyspnea and syncope.   Respiratory: Negative for cough, hemoptysis, shortness of breath, snoring and wheezing.    Endocrine: Negative for cold intolerance, heat intolerance and polyuria.   Hematologic/Lymphatic: Negative for bleeding problem. Does not bruise/bleed easily.   Skin: Negative for color change and rash.   Musculoskeletal: Positive for joint pain. Negative for arthritis, back pain, muscle  cramps, myalgias and stiffness.   Gastrointestinal: Negative for abdominal pain, anorexia, constipation, diarrhea, dysphagia, heartburn, hemorrhoids, melena, nausea and vomiting.   Genitourinary: Negative for frequency, hematuria, hesitancy, nocturia and urgency.   Neurological: Negative for difficulty with concentration, dizziness, focal weakness, headaches, light-headedness, numbness, seizures, tremors and vertigo.   Psychiatric/Behavioral: Negative for altered mental status and depression. The patient has insomnia.    Allergic/Immunologic: Negative for hives.        Objective:    Physical Exam   Constitutional: She appears well-developed and well-nourished.   HENT:   Head: Normocephalic and atraumatic.   Right Ear: External ear normal.   Left Ear: External ear normal.   Eyes: Conjunctivae and EOM are normal. Pupils are equal, round, and reactive to light.   Neck: Normal range of motion. Neck supple. No JVD present. No thyromegaly present.   Cardiovascular: Normal rate, regular rhythm, S1 normal, S2 normal, normal heart sounds and intact distal pulses.  Exam reveals no gallop, no S3 and no friction rub.    No murmur heard.  Pulses:       Carotid pulses are on the right side with bruit.       Radial pulses are 2+ on the right side, and 2+ on the left side.        Femoral pulses are 2+ on the right side, and 2+ on the left side.       Dorsalis pedis pulses are 2+ on the right side, and 2+ on the left side.        Posterior tibial pulses are 2+ on the right side, and 2+ on the left side.   Pulmonary/Chest: Effort normal. No stridor. She has no wheezes. She has no rales. She exhibits no tenderness.   Abdominal: Soft. Bowel sounds are normal. She exhibits no distension. There is no tenderness. There is no rebound and no guarding.   Musculoskeletal: Normal range of motion. She exhibits no edema or tenderness.   Psychiatric: She has a normal mood and affect.         Assessment:       1. Hyperlipidemia, unspecified  hyperlipidemia type    2. Right-sided carotid artery disease    3. Hemispheric carotid artery syndrome    4. Essential hypertension    5. Type 2 diabetes mellitus with other specified complication, with long-term current use of insulin         Plan:     Atypical symptoms for DOLORES disease. Will proceed with US Carotid B/L for further assessment as last imaging study was in 3/2017. Will consider randomization into CREST II trial.    Continue with ASA 81 mg po daily and lipitor.       I have personally taken the history and examined this patient. I have discussed and agree with the resident's findings and plan as documented in the resident's note.  Asymptomatic right carotid artery stenosis, Duplex US today = 80 to 99%. Recommend evaluate for CREST II Trial.   Fam Black

## 2018-02-07 ENCOUNTER — TELEPHONE (OUTPATIENT)
Dept: CARDIOLOGY | Facility: CLINIC | Age: 63
End: 2018-02-07

## 2018-02-07 ENCOUNTER — DOCUMENTATION ONLY (OUTPATIENT)
Dept: CARDIOLOGY | Facility: CLINIC | Age: 63
End: 2018-02-07

## 2018-02-07 DIAGNOSIS — E87.5 HYPERKALEMIA: Primary | ICD-10-CM

## 2018-02-07 NOTE — PROGRESS NOTES
Called results of carotid ultrasound. She is interested in pursing Crest 2 clinical trial. Will inform Lisa Blanc of her wishes. All Questions answered.

## 2018-02-07 NOTE — TELEPHONE ENCOUNTER
Lab reviewed with pt.  Pt is anemic but iron and B12 are normal.  Recommend hematology consult.     Potassium is elevated -- pt will discontinue bananas and tomatoes and repeat K level in a week or two (lab ordered).  May need to reduce losartan.    HgbA1c 7.9.  Pt is following ADA diet.  Continue same meds    Progression of carotid disease noted on carotid ultrasound--pt followed by Dr Black.

## 2018-02-08 ENCOUNTER — TELEPHONE (OUTPATIENT)
Dept: CARDIOLOGY | Facility: CLINIC | Age: 63
End: 2018-02-08

## 2018-02-08 LAB — FOLATE RBC-MCNC: 666 NG/ML

## 2018-02-08 NOTE — TELEPHONE ENCOUNTER
CREST 2   Investigator- Wayne    I called patient to discuss CREST 2 study participation. She was seen in Dr. Black' clinic on 2/6/18 and screened for the study. A carotid ultrasound done on 2/6/18 and qualifies her for study enrollment.     I discussed with Ms. Dyson the study design and how the study is randomized to where all patients get medical therapy and she may or may not get a carotid stent. I explained to her the baseline visit last about an 2 hours. At the baseline visit she will sign the study consent, do study questionnaires, stroke scales, medical history, contact information and randomization. She expressed interest in participating in the CREST 2 study and would like to return to the clinic to move forward with study enrollment. We agreed to schedule study visit on 2/14/18 at 11:00.

## 2018-02-14 ENCOUNTER — LAB VISIT (OUTPATIENT)
Dept: LAB | Facility: HOSPITAL | Age: 63
End: 2018-02-14
Attending: INTERNAL MEDICINE
Payer: COMMERCIAL

## 2018-02-14 ENCOUNTER — RESEARCH ENCOUNTER (OUTPATIENT)
Dept: RESEARCH | Facility: HOSPITAL | Age: 63
End: 2018-02-14

## 2018-02-14 DIAGNOSIS — Z00.6 EXAMINATION OF PARTICIPANT IN CLINICAL TRIAL: ICD-10-CM

## 2018-02-14 DIAGNOSIS — E87.5 HYPERKALEMIA: ICD-10-CM

## 2018-02-14 DIAGNOSIS — I77.9 RIGHT-SIDED CAROTID ARTERY DISEASE: ICD-10-CM

## 2018-02-14 DIAGNOSIS — I77.9 RIGHT-SIDED CAROTID ARTERY DISEASE: Primary | ICD-10-CM

## 2018-02-14 LAB
CK SERPL-CCNC: 104 U/L
POTASSIUM SERPL-SCNC: 5.6 MMOL/L

## 2018-02-14 PROCEDURE — 82550 ASSAY OF CK (CPK): CPT

## 2018-02-14 PROCEDURE — 84132 ASSAY OF SERUM POTASSIUM: CPT

## 2018-02-14 PROCEDURE — 36415 COLL VENOUS BLD VENIPUNCTURE: CPT

## 2018-02-14 NOTE — PROGRESS NOTES
Date Consent signed: 02/14/2018     Sponsor: [facility]      Study Title/IRB Number: [id number]     Principle Investigator: Chad Martinez MD     Present for Discussion: ABDIRAHMAN Breg and Ms. Dyson and 2 of her relatives     Prior to the Informed Consent (IC) being signed, or any study protocol required testing, procedure, or intervention being performed, the following was done or discussed:  · Patient was given a copy of the IC for review   · Purpose of the study   · Follow up schedule and tests or procedures done at each follow up   · Confidentiality and HIPAA Authorization for Release of Medical Records for the research trial/ subject's rights/research related injury  · Risk, Benefits, Costs and Alternatives to the study  · Participation in the research trial is voluntary and patient may withdraw at anytime  · Contact information for study related questions     Patient verbalizes understanding of the above: Yes  Patients cardiologist Dr. Perry notified patient signed consent form for study: Yes  Contact information for CRC and PI given to patient: Yes     Julieth Johnson signed the informed consent form for the CREST 2 study with an IRB approval date of 11/7/2017.  Each page of the consent form was reviewed with the subject and pts family and all questions answered satisfactorily. Ms. Dyson signed the consent form and received a copy of same. The original consent was scanned into electronic medical records (EPIC) and filed into the subject's research study binder. Subject was randomized during baseline visit to [facility]. All information will be sent to Dr. Lee for review. Patient will return in 44 days for [facility] 2 follow-up.    The following tests were performed at the baseline visit:  1. NIH = 0  2. MRS= 0  3. TIA questionnaire - all answers are no  4. Medical history  5. Contact information  6. Cognitive assessment   7. Medications/labs reviewed  8. Blood pressures- (in left  arm with study machine)  162/86 79, 168/86 75, 165/91 75   Standing 179/86 77

## 2018-02-15 ENCOUNTER — TELEPHONE (OUTPATIENT)
Dept: CARDIOLOGY | Facility: CLINIC | Age: 63
End: 2018-02-15

## 2018-02-15 DIAGNOSIS — I10 ESSENTIAL HYPERTENSION: Primary | ICD-10-CM

## 2018-02-15 RX ORDER — CHLORTHALIDONE 25 MG/1
TABLET ORAL
Qty: 30 TABLET | Refills: 11 | Status: SHIPPED | OUTPATIENT
Start: 2018-02-15 | End: 2018-09-17 | Stop reason: SDUPTHER

## 2018-02-15 RX ORDER — ASPIRIN 325 MG
325 TABLET, DELAYED RELEASE (ENTERIC COATED) ORAL DAILY
Qty: 30 TABLET | Refills: 11 | Status: SHIPPED | OUTPATIENT
Start: 2018-02-15 | End: 2023-04-04 | Stop reason: ALTCHOICE

## 2018-02-15 NOTE — TELEPHONE ENCOUNTER
----- Message from Lisa Blanc sent at 2/14/2018  5:18 PM CST -----  Regarding: CREST 2- new subject   Date Consent signed: 02/14/2018  CREST 2 - DANNIELLE arm- randomized to IMM          Julieth HahnSriKiel signed the informed consent form for the CREST 2 study with an IRB approval date of 11/7/2017.  Subject was randomized during baseline visit to [facility]. All information will be sent to Dr. Lee for review. Patient will return in 44 days for CREST 2 follow-up. Please review labs from 2/6/18 - LDL = 64.8, Hemoglobin A1C= 7.9. Subject is supposed to follow-up with endocrinologist on 2/27/18. She will call me to report what happens at that visit.     The following tests were performed at the baseline visit:  1. NIH = 0  2. MRS= 0  3. TIA questionnaire - all answers are no  4. Medical history  5. Contact information  6. Cognitive assessment   7. Medications/labs reviewed  8. Blood pressures- (in left arm with study machine)  162/86 79, 168/86 75, 165/91 75   Standing 179/86 77      Please call and let subject know what medication changes will be made based on elevated BP.

## 2018-02-15 NOTE — TELEPHONE ENCOUNTER
Please call the patient to add chlorthalidone 12.5 mg daily to her blood pressure regimen per CREST II protocol. She will need a 30 day BP check with research to ensure she is at goal. She will also need a repeat BMP in one week.

## 2018-02-16 ENCOUNTER — TELEPHONE (OUTPATIENT)
Dept: CARDIOLOGY | Facility: CLINIC | Age: 63
End: 2018-02-16

## 2018-02-16 NOTE — TELEPHONE ENCOUNTER
Repeat K is still 5.6.  However pt was begun on chlorthalidone which will lower the K.  Message left on voicemail.

## 2018-02-19 ENCOUNTER — TELEPHONE (OUTPATIENT)
Dept: CARDIOLOGY | Facility: CLINIC | Age: 63
End: 2018-02-19

## 2018-02-19 NOTE — TELEPHONE ENCOUNTER
CREST 2      Subject called to get clarification on aspirin dosage and express concern with elevated potassium levels. She verified she started taking aspirin 325 mg on 2/15/18 per the CREST 2 protocol. I explained she would take this amount of aspirin as long as she is enrolled in the study. Dr. Lee prescribed a new BP medication based on elevated BP from baseline visit and the prescription was called into [facility] on 2/15/18. This new medication chlorthalidone 12.5 mg will be taken in addition to existing BP medication every day. Dr. Perry was notified of this medication change and he thinks chlorthalidone might help lower her potassium level. She will go and  medication today. She has also started a new exercise program and has cut back on eating spinach which she believes might be the reason why potassium is elevated.     She will need to return in one week to have blood work done. I will send a message to Dr. Lee's nurse to schedule labs on the [place].    Subject was instructed to call if she has any more questions or concerns.

## 2018-02-28 ENCOUNTER — TELEPHONE (OUTPATIENT)
Dept: CARDIOLOGY | Facility: CLINIC | Age: 63
End: 2018-02-28

## 2018-02-28 ENCOUNTER — LAB VISIT (OUTPATIENT)
Dept: LAB | Facility: HOSPITAL | Age: 63
End: 2018-02-28
Attending: INTERNAL MEDICINE
Payer: COMMERCIAL

## 2018-02-28 DIAGNOSIS — I10 ESSENTIAL HYPERTENSION: ICD-10-CM

## 2018-02-28 LAB
ANION GAP SERPL CALC-SCNC: 7 MMOL/L
BUN SERPL-MCNC: 28 MG/DL
CALCIUM SERPL-MCNC: 9.7 MG/DL
CHLORIDE SERPL-SCNC: 110 MMOL/L
CO2 SERPL-SCNC: 23 MMOL/L
CREAT SERPL-MCNC: 1.2 MG/DL
EST. GFR  (AFRICAN AMERICAN): 56 ML/MIN/1.73 M^2
EST. GFR  (NON AFRICAN AMERICAN): 48.6 ML/MIN/1.73 M^2
GLUCOSE SERPL-MCNC: 200 MG/DL
POTASSIUM SERPL-SCNC: 5.4 MMOL/L
SODIUM SERPL-SCNC: 140 MMOL/L

## 2018-02-28 PROCEDURE — 36415 COLL VENOUS BLD VENIPUNCTURE: CPT | Mod: PO

## 2018-02-28 PROCEDURE — 80048 BASIC METABOLIC PNL TOTAL CA: CPT

## 2018-02-28 NOTE — TELEPHONE ENCOUNTER
Patient's potassium resulted high at 5.4 . It was 5.6 two weeks ago. Recommend cutting losartan down to 50 mg daily and avoiding high potassium foods like potatoes, bananas, citrus fruits and avacados. Recommend repeat potassium check in one week with PCP or her primary cardiologist Dr Perry.

## 2018-03-01 ENCOUNTER — TELEPHONE (OUTPATIENT)
Dept: CARDIOLOGY | Facility: CLINIC | Age: 63
End: 2018-03-01

## 2018-03-01 NOTE — TELEPHONE ENCOUNTER
CREST 2     Called subject to schedule 1 month follow-up appointment. Not able to leave message on cell  because mailbox is full.

## 2018-03-06 ENCOUNTER — TELEPHONE (OUTPATIENT)
Dept: CARDIOLOGY | Facility: CLINIC | Age: 63
End: 2018-03-06

## 2018-03-07 ENCOUNTER — TELEPHONE (OUTPATIENT)
Dept: CARDIOLOGY | Facility: CLINIC | Age: 63
End: 2018-03-07

## 2018-03-09 ENCOUNTER — TELEPHONE (OUTPATIENT)
Dept: CARDIOLOGY | Facility: CLINIC | Age: 63
End: 2018-03-09

## 2018-03-13 ENCOUNTER — TELEPHONE (OUTPATIENT)
Dept: CARDIOLOGY | Facility: CLINIC | Age: 63
End: 2018-03-13

## 2018-03-13 NOTE — TELEPHONE ENCOUNTER
CREST 2    Spoke to subject's daughter today regarding not being able to get in touch with Ms. Dyson. I am trying to contact her to schedule 1 month follow-up and the subject will not return my calls. She said her mother was nervous and had some concerns about the study.      The daughter also had some questions about the study. She  was concerned about her mother's aspirin being increased from 81 mg to 325 mg. I told her it was very common for patients with peripheral vascular disease to take  mg. We spoke about the details of the medical therapy arm of the study and the different programs the CREST 2 study offers subjects.         She will contact her mother and have her call me. She stated her mother is worried and has concerns about the study.

## 2018-03-15 ENCOUNTER — TELEPHONE (OUTPATIENT)
Dept: CARDIOLOGY | Facility: CLINIC | Age: 63
End: 2018-03-15

## 2018-03-15 ENCOUNTER — RESEARCH ENCOUNTER (OUTPATIENT)
Dept: RESEARCH | Facility: HOSPITAL | Age: 63
End: 2018-03-15

## 2018-03-15 DIAGNOSIS — E87.5 HYPERKALEMIA: ICD-10-CM

## 2018-03-15 RX ORDER — FELODIPINE 5 MG/1
5 TABLET, EXTENDED RELEASE ORAL DAILY
Qty: 30 TABLET | Refills: 11 | Status: SHIPPED | OUTPATIENT
Start: 2018-03-15 | End: 2018-11-06

## 2018-03-15 NOTE — TELEPHONE ENCOUNTER
Please call the patient to start felodipine 5 mg daily per CREST II protocol for uncontrolled HTN. She will need a repeat BP check in 30 days with the research coordinator. Please schedule a repeat potassium level as well.

## 2018-03-15 NOTE — PROGRESS NOTES
I met with CREST 2 patient for a blood pressure review following elevated blood pressure readings at baseline visit on 2/14/2018 . During our visit we took a total of 3 sitting blood pressures on the right arm, using the provided study device.  The readings are as follows:   151/85; 80  155/86; 79  162/89; 77  Average BP: 156/86; 77    Study medical management physician was informed of the readings, and will make adjustments to patient's medications accordingly. Patient had repeat BMP done 2/28/18 after initial baseline lab draw due to elevated Potassium. Repeat labs abnormal as well, and patient advised by medical management physician to have additional repeat Potassium drawn 1 week after, but was never performed.

## 2018-03-15 NOTE — TELEPHONE ENCOUNTER
----- Message from Paige Childs sent at 3/15/2018  2:04 PM CDT -----  Regarding: CREST-2 unscheduled BP follow-up  I met with CREST 2 patient for a blood pressure review following elevated blood pressure readings at baseline visit on 2/14/2018 . During our visit we took a total of 3 sitting blood pressures on the right arm, using the provided study device.  The readings are as follows:   151/85; 80  155/86; 79  162/89; 77  Average BP: 156/86; 77      Patient had repeat BMP done 2/28/18 after initial baseline lab draw due to elevated Potassium. Repeat labs abnormal as well, and patient advised by medical management physician to have additional repeat Potassium drawn 1 week after, but was never performed. Please advise on whether we should have repeat Potassium performed at 44 day visit to be scheduled approximately 4/15/18, or if it should be ordered sooner.

## 2018-03-16 NOTE — TELEPHONE ENCOUNTER
Called patient she is very concerned about taking all of these blood pressure medications.  I explained to her that her blood pressures were high.  It didn't matter to her she feels that taking 3 medications for blood pressures is to much.  Will inform Dr Lee and research.

## 2018-03-19 ENCOUNTER — TELEPHONE (OUTPATIENT)
Dept: CARDIOLOGY | Facility: CLINIC | Age: 63
End: 2018-03-19

## 2018-03-20 ENCOUNTER — TELEPHONE (OUTPATIENT)
Dept: INTERNAL MEDICINE | Facility: CLINIC | Age: 63
End: 2018-03-20

## 2018-03-20 NOTE — TELEPHONE ENCOUNTER
----- Message from Hadley Gonzalez sent at 3/15/2018  2:53 PM CDT -----  Contact: Patient [phone]  She would like to establish care with you but, I don't get any dates when I search for availability. Please call her and advise.    Thank you

## 2018-03-20 NOTE — TELEPHONE ENCOUNTER
I'm pretty certain there's no new pt slots until June (she can take a provider hold for est care - 40 min). However please note that I'll be on maternity leave July to Sept.

## 2018-03-21 ENCOUNTER — TELEPHONE (OUTPATIENT)
Dept: CARDIOLOGY | Facility: CLINIC | Age: 63
End: 2018-03-21

## 2018-03-21 NOTE — TELEPHONE ENCOUNTER
CREST 2    I am not able to get in touch with Ms. Dyson to make lab appointment or schedule next CREST 2 visit. I left message on patient's sister's cell phone to please have Ms. Kiel give me a call.

## 2018-03-26 ENCOUNTER — LAB VISIT (OUTPATIENT)
Dept: LAB | Facility: HOSPITAL | Age: 63
End: 2018-03-26
Attending: INTERNAL MEDICINE
Payer: COMMERCIAL

## 2018-03-26 DIAGNOSIS — E87.5 HYPERKALEMIA: ICD-10-CM

## 2018-03-26 LAB — POTASSIUM SERPL-SCNC: 5.3 MMOL/L

## 2018-03-26 PROCEDURE — 84132 ASSAY OF SERUM POTASSIUM: CPT

## 2018-03-26 PROCEDURE — 36415 COLL VENOUS BLD VENIPUNCTURE: CPT | Mod: PO

## 2018-03-28 ENCOUNTER — TELEPHONE (OUTPATIENT)
Dept: CARDIOLOGY | Facility: CLINIC | Age: 63
End: 2018-03-28

## 2018-03-28 NOTE — TELEPHONE ENCOUNTER
----- Message from Azucena Lee MD sent at 3/28/2018 10:56 AM CDT -----  Her potassium continues to remain elevated. Recommend decreasing losartan to 50 mg daily and starting felodipine as previously recommended. She will need a 30 day follow up BP visit with research. Please ask her to avoid potassium rich foods such as bananas, potatoes, citrus fruits and avocados.

## 2018-03-29 ENCOUNTER — TELEPHONE (OUTPATIENT)
Dept: CARDIOLOGY | Facility: CLINIC | Age: 63
End: 2018-03-29

## 2018-04-09 ENCOUNTER — TELEPHONE (OUTPATIENT)
Dept: CARDIOLOGY | Facility: CLINIC | Age: 63
End: 2018-04-09

## 2018-04-10 ENCOUNTER — TELEPHONE (OUTPATIENT)
Dept: CARDIOLOGY | Facility: CLINIC | Age: 63
End: 2018-04-10

## 2018-04-10 NOTE — TELEPHONE ENCOUNTER
Ms. Dyson returned my call to schedule next CREST 2 follow-up. She will return on 4/13/18 at 11:00 for a research appointment.

## 2018-04-13 ENCOUNTER — RESEARCH ENCOUNTER (OUTPATIENT)
Dept: RESEARCH | Facility: HOSPITAL | Age: 63
End: 2018-04-13

## 2018-04-13 NOTE — PROGRESS NOTES
CREST 2  IMM alone      I met with CREST 2 patient for a blood pressure review following elevated blood pressure readings at 1 month visit on 3/15/2018 . No adverse events to report since last visit. She has not had any TIA or stroke symptoms. She has had a few medication changes since her last visit. She has been visiting with family in [place] for the last 2 weeks so was not able to return for 44 day follow-up within the protocol window.   NIH= 0  MRS= 0      During our visit we took a total of 3 sitting blood pressures on the right arm, using the provided study device.   The BP readings are as follows:   135/74 86   133/75 90  145/78 88  Average BP:  138/76       Study medical management physician will be informed of the readings. No medication adjustments will need to be made based on today's BP average.

## 2018-06-07 ENCOUNTER — TELEPHONE (OUTPATIENT)
Dept: CARDIOLOGY | Facility: CLINIC | Age: 63
End: 2018-06-07

## 2018-06-07 NOTE — TELEPHONE ENCOUNTER
Called patient to schedule 4 month appointment. Agreed on the date of 6/28/18 at 11:00. I will schedule and send appointment slip.

## 2018-06-28 ENCOUNTER — RESEARCH ENCOUNTER (OUTPATIENT)
Dept: RESEARCH | Facility: HOSPITAL | Age: 63
End: 2018-06-28

## 2018-06-28 NOTE — PROGRESS NOTES
CREST 2   Randomized to IMM (Medical Therapy)     Subject returned to the Cardiology clinic for 4 month CREST 2 follow-up. She has been doing well since last visit. She has not been to the ED or hospitalized overnight. Weight today is 68.3 kg. She has not had any changes to his contact information. All information from today's visit will be sent to Dr. Lee for review.     The following were completed during this clinic visit:  1. NIH = 0  2. MRS = 0  3. TIA stroke questionnaire - all answers were no  4. Blood pressure taken with study provided machine in the right arm     133/76 71  131/76 71  133/86 71    132/79  Average       She agrees to continue in the study, signed the updated consent form and agreed to participate in the end of study MRI.

## 2018-07-03 ENCOUNTER — TELEPHONE (OUTPATIENT)
Dept: CARDIOLOGY | Facility: CLINIC | Age: 63
End: 2018-07-03

## 2018-07-03 DIAGNOSIS — I10 ESSENTIAL HYPERTENSION: Primary | ICD-10-CM

## 2018-07-03 NOTE — TELEPHONE ENCOUNTER
----- Message from Paige Childs sent at 6/28/2018  1:28 PM CDT -----  Regarding: CREST 2 Follow-up  CREST 2   Randomized to IMM (Medical Therapy)     Subject returned to the Cardiology clinic for 4 month CREST 2 follow-up. She has been doing well since last visit. She has not been to the ED or hospitalized overnight. Weight today is 68.3 kg. She has not had any changes to his contact information. All information from today's visit will be sent to Dr. Lee for review.     The following were completed during this clinic visit:  1. NIH = 0  2. MRS = 0  3. TIA stroke questionnaire - all answers were no  4. Blood pressure taken with study provided machine in the right arm     133/76 71  131/76 71  133/86 71     132/79  Average       Patient will need to return in 1 month for blood pressure check due to SBP being out of range (New cutoff is 130mmHg)

## 2018-07-03 NOTE — TELEPHONE ENCOUNTER
Called patient and told her to increase chlorthalidone and labs next week.        Patient Calls     Azucena Lee MD sent to  You; Lisa Blanc 2 hours ago (7:19 AM)      Please schedule BMP one week after increasing chlorthalidone dose to 25 mg daily. (Routing comment)       Azucena Lee MD 2 hours ago (7:17 AM)         Please have her increase chlorthalidone to 25 mg daily with a BMP in one week. She will need a research BP check in 30 days. Goal BP < 130/80 per CREST II trial.         Documentation       Azucena Lee MD 2 hours ago (7:17 AM)         ----- Message from Paige Childs sent at 6/28/2018  1:28 PM CDT -----  Regarding: CREST 2 Follow-up  CREST 2   Randomized to IMM (Medical Therapy)     Subject returned to the Cardiology clinic for 4 month CREST 2 follow-up. She has been doing well since last visit. She has not been to the ED or hospitalized overnight. Weight today is 68.3 kg. She has not had any changes to his contact information. All information from today's visit will be sent to Dr. Lee for review.     The following were completed during this clinic visit:  1. NIH = 0  2. MRS = 0  3. TIA stroke questionnaire - all answers were no  4. Blood pressure taken with study provided machine in the right arm     133/76 71  131/76 71  133/86 71     132/79  Average       Patient will need to return in 1 month for blood pressure check due to SBP being out of range (New cutoff is 130mmHg)            Documentation

## 2018-07-03 NOTE — TELEPHONE ENCOUNTER
Called subject to schedule 30 day BP check and to make sure she recieved instructions to increase her Chlorthalidone to 25 mg qd. She confirmed she received instructions. She will call me next week to schedule BP appointment.

## 2018-07-19 ENCOUNTER — TELEPHONE (OUTPATIENT)
Dept: CARDIOLOGY | Facility: CLINIC | Age: 63
End: 2018-07-19

## 2018-07-31 NOTE — TELEPHONE ENCOUNTER
CREST 2    Left message for subject to return my call to schedule 30 day BP visit. She was not able to return to the clinic for 44 day visit because she went out of town with her daughter.    no indicators present

## 2018-08-06 ENCOUNTER — TELEPHONE (OUTPATIENT)
Dept: RESEARCH | Facility: HOSPITAL | Age: 63
End: 2018-08-06

## 2018-08-08 ENCOUNTER — TELEPHONE (OUTPATIENT)
Dept: RESEARCH | Facility: HOSPITAL | Age: 63
End: 2018-08-08

## 2018-08-08 ENCOUNTER — TELEPHONE (OUTPATIENT)
Dept: CARDIOLOGY | Facility: CLINIC | Age: 63
End: 2018-08-08

## 2018-08-08 NOTE — TELEPHONE ENCOUNTER
Called patient to follow up on missed appointment. Called patient's cell phone and left a message on voicemail. Patient was asked to return the call. Office number was left in voicemail.

## 2018-08-08 NOTE — TELEPHONE ENCOUNTER
CREST 2 - BP check    Subject was not able to come in yesterday for BP check because she is still out of town. She will be back on 8/25/18. She will call me to reschedule.

## 2018-08-30 ENCOUNTER — TELEPHONE (OUTPATIENT)
Dept: CARDIOLOGY | Facility: CLINIC | Age: 63
End: 2018-08-30

## 2018-08-30 NOTE — TELEPHONE ENCOUNTER
CREST 2  BP check    Called subject to schedule past due BP check. Subject states she is still out of town and will be back on 9/10/18. She will call me to arrange a date to return for follow-up.

## 2018-09-17 RX ORDER — CHLORTHALIDONE 25 MG/1
TABLET ORAL
Qty: 30 TABLET | Refills: 11 | Status: SHIPPED | OUTPATIENT
Start: 2018-09-17 | End: 2018-09-19 | Stop reason: DRUGHIGH

## 2018-09-17 NOTE — TELEPHONE ENCOUNTER
----- Message from Digna Leonard sent at 9/17/2018 12:29 PM CDT -----  Contact: pt  Pt need a refill on her Hygroten 25 mg and please send to [facility]. Pt is currently enrolled in the Crest 2 program, she has never seen Dr. Lee.    Thanks

## 2018-09-19 ENCOUNTER — RESEARCH ENCOUNTER (OUTPATIENT)
Dept: RESEARCH | Facility: HOSPITAL | Age: 63
End: 2018-09-19

## 2018-09-19 RX ORDER — CHLORTHALIDONE 25 MG/1
25 TABLET ORAL DAILY
COMMUNITY
Start: 2018-09-18 | End: 2018-09-20 | Stop reason: SDUPTHER

## 2018-09-19 NOTE — PROGRESS NOTES
CREST 2  BP check       Ms. Hahn came in today for a BP check. Her last visit was on 6/28/18. At that visit her BP was slightly elevated so Dr. Lee increased her chlorthalidone from 12.5 to 25 mg qd. The pharmacy on file has requested the chlorthalidone prescription to be called in with the 25 mg dosage. She ran out of this medication yesterday and needs it refilled as soon as possible. Dr. Lee will be sent a message with today's results.    Her BP from today were:   130/78 74   128/74 73   131/80 75   Average = 130/77     According to the CREST 2 protocol she does not need her medications adjusted. She will return in October for next CREST 2 visit.

## 2018-09-19 NOTE — TELEPHONE ENCOUNTER
----- Message from Eileen De Jesus sent at 9/19/2018  9:15 AM CDT -----  Contact: Pt called   Pt stated that the prescription was suppose to be changed to 1 tablet  chlorthalidone (HYGROTEN) 25 MG Tab and it the pharmacy will not refill the medication until it's corrected. Also send the correct dosage to [facility]/pharmacy #[id number] - [place], [place] - [address]. [phone] (Phone)[phone] (Fax.Please call pt @ [phone]. Thank you.

## 2018-09-20 ENCOUNTER — TELEPHONE (OUTPATIENT)
Dept: CARDIOLOGY | Facility: CLINIC | Age: 63
End: 2018-09-20

## 2018-09-20 RX ORDER — CHLORTHALIDONE 25 MG/1
25 TABLET ORAL DAILY
Qty: 90 TABLET | Refills: 3 | Status: SHIPPED | OUTPATIENT
Start: 2018-09-20 | End: 2018-11-06

## 2018-09-20 NOTE — TELEPHONE ENCOUNTER
----- Message from Lisa Blanc sent at 9/19/2018  2:29 PM CDT -----  Regarding: CREST 2 BP check  Jeb,    Ms. Hahn came in today for a BP check. Her last visit was on 6/28/18. At that visit her BP was slightly elevated and you increased her chlorthalidone from 12.5 to 25 mg qd. The pharmacy on file has requested the chlorthalidone prescription to be called in with the 25 mg dosage. She ran out of this medication yesterday and needs it refilled as soon as possible.    Her BP from today were:  130/78 74  128/74 73  131/80 75  Average = 130/77     According to the CREST 2 protocol she does not need her medications adjusted. She will return in October for next CREST 2 visit.         Thank you,  Lisa

## 2018-10-10 ENCOUNTER — TELEPHONE (OUTPATIENT)
Dept: CARDIOLOGY | Facility: CLINIC | Age: 63
End: 2018-10-10

## 2018-10-10 NOTE — TELEPHONE ENCOUNTER
CREST 2    Call subject to schedule 8 month visit. She is in [place] and will be home in 2 weeks. She will call me to schedule appointment.

## 2018-11-05 ENCOUNTER — TELEPHONE (OUTPATIENT)
Dept: CARDIOLOGY | Facility: CLINIC | Age: 63
End: 2018-11-05

## 2018-11-05 ENCOUNTER — RESEARCH ENCOUNTER (OUTPATIENT)
Dept: RESEARCH | Facility: HOSPITAL | Age: 63
End: 2018-11-05

## 2018-11-05 DIAGNOSIS — E87.5 HYPERKALEMIA: ICD-10-CM

## 2018-11-05 DIAGNOSIS — I10 ESSENTIAL HYPERTENSION: Primary | ICD-10-CM

## 2018-11-05 NOTE — PROGRESS NOTES
CREST 2     Randomized to IMM (Medical Therapy)     Subject returned to the Cardiology clinic for 8 month CREST 2 follow-up. She has been doing well since last visit. She has not been to the ED or hospitalized overnight. Weight today is 68.5 kg. She has not had any changes to her contact information. She does not drink alcohol or smoke cigarettes and exercises 30 minutes a day of moderate exercise 5 or more days a week for the last 1-5 months.  I spoke to Ms. Hahn about getting a new BP cuff for at home and starting a BP diary. Instructed her to take BP in the morning about 1 hour after taking BP medications and also in the evening.  All information from today's visit will be sent to Dr. Lee for review.    The following were completed during this clinic visit:   1. NIH = 0   2. MRS = 0   3. TIA stroke questionnaire - all answers were no   4. Blood pressure taken with study provided machine in the left arm     139/84 75  130/82 75  140/84 76  Average= 136/83

## 2018-11-05 NOTE — TELEPHONE ENCOUNTER
BP is above goal per CREST II protocol. Please have her increase her felodipine to 10 mg daily. She will need a 30 day BP visit with research.

## 2018-11-05 NOTE — TELEPHONE ENCOUNTER
----- Message from Lisa Blanc sent at 2018  1:44 PM CST -----  Regardin month CREST 2 visit  Randomized to IMM (Medical Therapy)      Subject returned to the Cardiology clinic for 8 month CREST 2 follow-up. She has been doing well since last visit. She has not been to the ED or hospitalized overnight. Weight today is 68.5 kg. She has not had any changes to her contact information. She does not drink alcohol or smoke cigarettes and exercises 30 minutes a day of moderate exercise 5 or more days a week for the last 1-5 months.  I spoke to Ms. Hahn about getting a new BP cuff for at home and starting a BP diary. Instructed her to take BP in the morning about 1 hour after taking BP medications and also in the evening. .     The following were completed during this clinic visit:   1. NIH = 0   2. MRS = 0   3. TIA stroke questionnaire - all answers were no   4. Blood pressure taken with study provided machine in the left arm      139/84 75  130/82 75  140/84 76  Average= 136/83

## 2018-11-06 RX ORDER — CHLORTHALIDONE 50 MG/1
50 TABLET ORAL DAILY
Qty: 30 TABLET | Refills: 11 | Status: SHIPPED | OUTPATIENT
Start: 2018-11-06 | End: 2019-12-10 | Stop reason: SDUPTHER

## 2018-11-06 RX ORDER — FELODIPINE 10 MG/1
10 TABLET, EXTENDED RELEASE ORAL DAILY
Qty: 30 TABLET | Refills: 11 | Status: SHIPPED | OUTPATIENT
Start: 2018-11-06 | End: 2024-03-12

## 2018-11-06 NOTE — TELEPHONE ENCOUNTER
Patient currently taking felodipine 10 mg daily. Please have her increase chlorthalidone to 50 mg daily and repeat a BMP in one week.

## 2018-11-19 ENCOUNTER — TELEPHONE (OUTPATIENT)
Dept: CARDIOLOGY | Facility: CLINIC | Age: 63
End: 2018-11-19

## 2018-11-19 NOTE — TELEPHONE ENCOUNTER
CREST 2    Called Ms. Hahn to get BP diary results. She is not at home and does not have the results with her. She will take pictures of the BP diary and e-mail them to me. I will call her back on Friday with an BP average and a plan regarding her BP medications.

## 2018-11-23 ENCOUNTER — TELEPHONE (OUTPATIENT)
Dept: CARDIOLOGY | Facility: CLINIC | Age: 63
End: 2018-11-23

## 2018-11-23 NOTE — TELEPHONE ENCOUNTER
We need to bring her in for the 30 day BP visit as that is the only reading that counts for the study.

## 2018-11-23 NOTE — TELEPHONE ENCOUNTER
I called and spoke to Ms. Hahn for results for the past week BP diary. I will send the results to Dr. Lee.    11/14/18   9:00a 122/77 (before meds)                   4:30p 110/68    11/15/18   2:30p 133/90     11/16/18   9:29a  132/92    11/17/18   6:00a   124/78    11/18/18  10:30a   140/96    11/19/18  9:00a    109/71     11/20/18  9:10a   122/79    AVERAGE  =  124/81

## 2018-11-30 ENCOUNTER — TELEPHONE (OUTPATIENT)
Dept: CARDIOLOGY | Facility: CLINIC | Age: 63
End: 2018-11-30

## 2018-12-12 ENCOUNTER — TELEPHONE (OUTPATIENT)
Dept: CARDIOLOGY | Facility: CLINIC | Age: 63
End: 2018-12-12

## 2018-12-12 ENCOUNTER — RESEARCH ENCOUNTER (OUTPATIENT)
Dept: RESEARCH | Facility: HOSPITAL | Age: 63
End: 2018-12-12

## 2018-12-12 NOTE — TELEPHONE ENCOUNTER
I cannot make any medication changes until she gets her blood work drawn. She needs a metabolic panel checked after the last changes made. She had orders placed in July and November. I have not seen results. Her potassium has been elevated in the past. She can come back for a 30 day BP visit.

## 2018-12-12 NOTE — PROGRESS NOTES
Met with patient for CREST 2 blood pressure re-evaluation. Patient stated during breakfast she added some raw salt to her meal which she feels could have contributed to her elevated readings today. Blood pressure was taken in the right arm with study provided device. 3 seated and 1 standing pressure was recorded. Readings are as follows: 160/91;79, 159/92;79, 157/88;78, average 158/90. Heart rate: 078. Standin/86. She is scheduled for an a re-evaluation on 2019 at 11:00. Patient is issued a stipend per the study through the [employer] system.

## 2018-12-12 NOTE — TELEPHONE ENCOUNTER
----- Message from Yuan Carter sent at 2018  1:35 PM CST -----  Regarding: CREST 2  Met with patient for CREST 2 blood pressure re-evaluation. Patient stated during breakfast she added some raw salt to her meal which she feels could have contributed to her elevated readings today. Blood pressure was taken in the right arm with study provided device. 3 seated and 1 standing pressure was recorded.     Readings are as follows:     160/91;79   159/92;79   157/88;78     Average 158/90    Heart rate: 078     Standin/86

## 2018-12-14 ENCOUNTER — TELEPHONE (OUTPATIENT)
Dept: RESEARCH | Facility: HOSPITAL | Age: 63
End: 2018-12-14

## 2018-12-14 ENCOUNTER — RESEARCH ENCOUNTER (OUTPATIENT)
Dept: RESEARCH | Facility: HOSPITAL | Age: 63
End: 2018-12-14

## 2018-12-14 DIAGNOSIS — Z00.6 EXAMINATION OF PARTICIPANT IN CLINICAL TRIAL: ICD-10-CM

## 2018-12-14 DIAGNOSIS — I10 HYPERTENSION, UNSPECIFIED TYPE: Primary | ICD-10-CM

## 2018-12-14 NOTE — TELEPHONE ENCOUNTER
Called patient in regards to note left by Dr. Lee. Labs will be scheduled for Monday at [facility].

## 2018-12-17 ENCOUNTER — LAB VISIT (OUTPATIENT)
Dept: LAB | Facility: HOSPITAL | Age: 63
End: 2018-12-17
Attending: INTERNAL MEDICINE
Payer: COMMERCIAL

## 2018-12-17 DIAGNOSIS — Z00.6 EXAMINATION OF PARTICIPANT IN CLINICAL TRIAL: ICD-10-CM

## 2018-12-17 DIAGNOSIS — I10 HYPERTENSION, UNSPECIFIED TYPE: ICD-10-CM

## 2018-12-17 LAB
ALBUMIN SERPL BCP-MCNC: 4 G/DL
ALP SERPL-CCNC: 90 U/L
ALT SERPL W/O P-5'-P-CCNC: 25 U/L
ANION GAP SERPL CALC-SCNC: 9 MMOL/L
ANION GAP SERPL CALC-SCNC: 9 MMOL/L
AST SERPL-CCNC: 18 U/L
BILIRUB SERPL-MCNC: 0.3 MG/DL
BUN SERPL-MCNC: 28 MG/DL
BUN SERPL-MCNC: 28 MG/DL
CALCIUM SERPL-MCNC: 10.1 MG/DL
CALCIUM SERPL-MCNC: 10.1 MG/DL
CHLORIDE SERPL-SCNC: 107 MMOL/L
CHLORIDE SERPL-SCNC: 107 MMOL/L
CO2 SERPL-SCNC: 22 MMOL/L
CO2 SERPL-SCNC: 22 MMOL/L
CREAT SERPL-MCNC: 1.2 MG/DL
CREAT SERPL-MCNC: 1.2 MG/DL
EST. GFR  (AFRICAN AMERICAN): 55.6 ML/MIN/1.73 M^2
EST. GFR  (AFRICAN AMERICAN): 55.6 ML/MIN/1.73 M^2
EST. GFR  (NON AFRICAN AMERICAN): 48.2 ML/MIN/1.73 M^2
EST. GFR  (NON AFRICAN AMERICAN): 48.2 ML/MIN/1.73 M^2
GLUCOSE SERPL-MCNC: 146 MG/DL
GLUCOSE SERPL-MCNC: 146 MG/DL
POTASSIUM SERPL-SCNC: 5.2 MMOL/L
POTASSIUM SERPL-SCNC: 5.2 MMOL/L
PROT SERPL-MCNC: 7.8 G/DL
SODIUM SERPL-SCNC: 138 MMOL/L
SODIUM SERPL-SCNC: 138 MMOL/L

## 2018-12-17 PROCEDURE — 36415 COLL VENOUS BLD VENIPUNCTURE: CPT | Mod: PO

## 2018-12-17 PROCEDURE — 80053 COMPREHEN METABOLIC PANEL: CPT

## 2018-12-18 ENCOUNTER — TELEPHONE (OUTPATIENT)
Dept: CARDIOLOGY | Facility: CLINIC | Age: 63
End: 2018-12-18

## 2018-12-18 RX ORDER — CARVEDILOL 6.25 MG/1
6.25 TABLET ORAL 2 TIMES DAILY WITH MEALS
Qty: 60 TABLET | Refills: 11 | Status: SHIPPED | OUTPATIENT
Start: 2018-12-18 | End: 2019-01-18

## 2018-12-18 NOTE — TELEPHONE ENCOUNTER
Blood pressure remains above goal for CREST II protocol. Potassium level is 5.2 so will not add spironolactone. Start carvedilol 6.25 mg bid. BP check with research in 30 days.

## 2018-12-18 NOTE — TELEPHONE ENCOUNTER
----- Message from Yuan Carter sent at 12/18/2018  8:35 AM CST -----  Regarding: CREST 2  The patient was scheduled for labs on yesterday. Her labs are resulted and available for viewing. Please let me know if you would like to make any medication changes and how you would like to proceed with the patient. Thank you in advance for your time.      Yuan

## 2018-12-19 ENCOUNTER — TELEPHONE (OUTPATIENT)
Dept: RESEARCH | Facility: HOSPITAL | Age: 63
End: 2018-12-19

## 2018-12-19 NOTE — TELEPHONE ENCOUNTER
Patient returned call, she was given information about medication from the [facility] 2 medical management physician (Dr. Lee) in reference to BP re-evaluation.

## 2018-12-19 NOTE — TELEPHONE ENCOUNTER
Called patient on both home and cell phone there was no answer on either. A message was left for patient to call back.  
Name band;

## 2019-01-14 ENCOUNTER — RESEARCH ENCOUNTER (OUTPATIENT)
Dept: RESEARCH | Facility: HOSPITAL | Age: 64
End: 2019-01-14
Payer: COMMERCIAL

## 2019-01-14 NOTE — PROGRESS NOTES
"I've met with Ms. Hahn for a follow-up BP check as per the CREST 2 study. She states the she is doing very well and had a keshia holiday. Her current weight is 69.0 kg. She stated that she has been taking her medication daily. Ms. Hahn did mention that she did not take her carvedilol this morning, because she did not eat before her visit and the medication must be taken with food. Her BP's were taken on the right arm, using the [employer] study device. BP are as followed:     1st sittin/89  2nd sittin/90  3rd sittin/93  Mean: 160/90     Heart rate: 75  Standing BP: 124/72     Her BP measurements will be given to her medical managing physician, Dr. Lee, and she will be notified of any changes. Due to her high BP, she will need to come back for a 30 day follow-up. During the visit, Ms. Hahn mentioned that she is considering dropping out of the CREST 2 study due to worries of "having her BP medication constantly increasing." Furthermore, she was frustrated with the readings and was vocal about opposing an increase in her carvedilol dose. She mentioned that she takes readings at home "in her bathroom, by sitting on her bathroom sink and her feet not crossed and flat on the ground, and that the readings were never this high." Did another BP check and the readings were 150/87. Will communicate with CREST 2 CRCs andDr. Lee about this matter and will see how they would like to proceed. If possible, we might have Ms. Hahn come back some time next week instead of waiting for another 30 day BP check. Will hold on the [employer] stipend until answer from Dr. Lee.    "

## 2019-01-15 ENCOUNTER — TELEPHONE (OUTPATIENT)
Dept: CARDIOLOGY | Facility: CLINIC | Age: 64
End: 2019-01-15

## 2019-01-17 ENCOUNTER — RESEARCH ENCOUNTER (OUTPATIENT)
Dept: RESEARCH | Facility: HOSPITAL | Age: 64
End: 2019-01-17
Payer: COMMERCIAL

## 2019-01-17 NOTE — PROGRESS NOTES
"I've met with Ms. Hahn for a follow-up BP re-check as per the CREST 2 study. She states "going through personal issues" but seems to be doing well. Also throughout the visit, she was constantly rubbing her left eye. Her current weight is 69.0 kg. She did take her medication daily, including the carvedilol. Her BP's were taken on the right arm, using the Omron study device. BP are as followed:     1st sittin/82  2nd sittin/79  3rd sittin/83  Mean: 135/83     Heart rate: 75  Standing BP: 115/70     Her BP measurements will be given to her medical managing physician, Dr. Lee, and Ms. Hahn will be notified of any changes. Ms. Hahn was also reminded of her 12 month visit and will scheduled at a later date. A $95.00 stipend will be put on her ClinCard as per the CREST 2 protocol.      "

## 2019-01-18 ENCOUNTER — TELEPHONE (OUTPATIENT)
Dept: CARDIOLOGY | Facility: CLINIC | Age: 64
End: 2019-01-18

## 2019-01-18 RX ORDER — CARVEDILOL 12.5 MG/1
12.5 TABLET ORAL 2 TIMES DAILY WITH MEALS
Qty: 60 TABLET | Refills: 11 | Status: SHIPPED | OUTPATIENT
Start: 2019-01-18 | End: 2019-02-26

## 2019-01-18 NOTE — TELEPHONE ENCOUNTER
----- Message from Debora Cano sent at 2019  2:53 PM CST -----  Good afternoon Dr. Lee,    I met with Ms. Hahn for her BP recheck, since her visit on 19. She did take her medication daily, including the carvedilol. Her BP's were taken on the right arm, using the Omron study device. BP are as followed:     1st sittin/82  2nd sittin/79  3rd sittin/83  Mean: 135/83    Please advise of any medication changes.

## 2019-01-18 NOTE — TELEPHONE ENCOUNTER
Please have her increase her carvedilol to 12.5 mg bid for goal SBP < 130. She will need a follow up BP check with research in 30 days.

## 2019-01-25 ENCOUNTER — TELEPHONE (OUTPATIENT)
Dept: RESEARCH | Facility: HOSPITAL | Age: 64
End: 2019-01-25

## 2019-01-25 NOTE — TELEPHONE ENCOUNTER
Called patient to follow up with CREST 2 annual appointment. Called both mobile and home phone. Was unable to leave voice message on either phone as the cell phone was full and no option for home phone. Will continue to try to contact patient.

## 2019-01-28 DIAGNOSIS — I77.9 RIGHT-SIDED CAROTID ARTERY DISEASE, UNSPECIFIED TYPE: Primary | ICD-10-CM

## 2019-01-28 DIAGNOSIS — Z00.6 EXAMINATION OF PARTICIPANT IN CLINICAL TRIAL: ICD-10-CM

## 2019-02-02 ENCOUNTER — HOSPITAL ENCOUNTER (EMERGENCY)
Facility: OTHER | Age: 64
Discharge: HOME | End: 2019-02-02
Attending: EMERGENCY MEDICINE
Payer: COMMERCIAL

## 2019-02-02 VITALS
SYSTOLIC BLOOD PRESSURE: 163 MMHG | TEMPERATURE: 99 F | DIASTOLIC BLOOD PRESSURE: 73 MMHG | WEIGHT: 148 LBS | OXYGEN SATURATION: 96 % | HEART RATE: 86 BPM | BODY MASS INDEX: 23.78 KG/M2 | RESPIRATION RATE: 18 BRPM | HEIGHT: 66 IN

## 2019-02-02 DIAGNOSIS — S67.197A CRUSHING INJURY OF LEFT LITTLE FINGER, INITIAL ENCOUNTER: Primary | ICD-10-CM

## 2019-02-02 PROCEDURE — 99283 EMERGENCY DEPT VISIT LOW MDM: CPT | Mod: 25

## 2019-02-02 PROCEDURE — 90715 TDAP VACCINE 7 YRS/> IM: CPT | Performed by: EMERGENCY MEDICINE

## 2019-02-02 PROCEDURE — 25000003 PHARM REV CODE 250: Performed by: EMERGENCY MEDICINE

## 2019-02-02 PROCEDURE — 90471 IMMUNIZATION ADMIN: CPT | Performed by: EMERGENCY MEDICINE

## 2019-02-02 PROCEDURE — 63600175 PHARM REV CODE 636 W HCPCS: Performed by: EMERGENCY MEDICINE

## 2019-02-02 RX ADMIN — BACITRACIN, NEOMYCIN, POLYMYXIN B 1 EACH: 400; 3.5; 5 OINTMENT TOPICAL at 05:02

## 2019-02-02 RX ADMIN — CLOSTRIDIUM TETANI TOXOID ANTIGEN (FORMALDEHYDE INACTIVATED), CORYNEBACTERIUM DIPHTHERIAE TOXOID ANTIGEN (FORMALDEHYDE INACTIVATED), BORDETELLA PERTUSSIS TOXOID ANTIGEN (GLUTARALDEHYDE INACTIVATED), BORDETELLA PERTUSSIS FILAMENTOUS HEMAGGLUTININ ANTIGEN (FORMALDEHYDE INACTIVATED), BORDETELLA PERTUSSIS PERTACTIN ANTIGEN, AND BORDETELLA PERTUSSIS FIMBRIAE 2/3 ANTIGEN 0.5 ML: 5; 2; 2.5; 5; 3; 5 INJECTION, SUSPENSION INTRAMUSCULAR at 05:02

## 2019-02-02 NOTE — ED PROVIDER NOTES
Encounter Date: 2/2/2019    SCRIBE #1 NOTE: I, Jorgito Escoto, am scribing for, and in the presence of, Dr. Rowell.       History     Chief Complaint   Patient presents with    Laceration     +laceartion to left pinky from smashing finger in a door. bleeding controlled upon assessment. pt unsure if UTD on tetanus      Time seen by provider: 5:14 PM    This is a 64 y.o. female, with history of DM and HTN, who presents with complaint of laceration to left little finger.  She reports that she slammed her finger in a car door seven hours ago.  She does report some pain in her finger.  She denies cleaning of laceration prior to arrival.  She denies fever, chills, SOB, or CP.  She reports that she takes  mg regularly.  She is unsure if tetanus is UTD.      The history is provided by the patient.     Review of patient's allergies indicates:  No Known Allergies  Past Medical History:   Diagnosis Date    Carotid artery bruit     Diabetes mellitus     High cholesterol     Hypertension      History reviewed. No pertinent surgical history.  Family History   Problem Relation Age of Onset    Diabetes Mother     Hepatitis Mother     No Known Problems Father     No Known Problems Sister     No Known Problems Brother     COPD Maternal Aunt     No Known Problems Maternal Uncle     No Known Problems Paternal Aunt     No Known Problems Paternal Uncle     Diabetes Maternal Grandmother     No Known Problems Maternal Grandfather     No Known Problems Paternal Grandmother     No Known Problems Paternal Grandfather     Anemia Neg Hx     Arrhythmia Neg Hx     Asthma Neg Hx     Clotting disorder Neg Hx     Fainting Neg Hx     Heart attack Neg Hx     Heart disease Neg Hx     Heart failure Neg Hx     Hyperlipidemia Neg Hx     Hypertension Neg Hx     Stroke Neg Hx     Atrial Septal Defect Neg Hx      Social History     Tobacco Use    Smoking status: Never Smoker    Smokeless tobacco: Never Used   Substance  Use Topics    Alcohol use: No     Alcohol/week: 0.0 oz    Drug use: No     Review of Systems   Constitutional: Negative for chills and fever.   HENT: Negative for congestion and sore throat.    Respiratory: Negative for cough and shortness of breath.    Cardiovascular: Negative for chest pain.   Gastrointestinal: Negative for abdominal pain, diarrhea, nausea and vomiting.   Genitourinary: Negative for decreased urine volume, difficulty urinating, dyspareunia, dysuria, enuresis, frequency, hematuria and urgency.   Musculoskeletal: Negative for back pain.   Skin: Positive for wound (Left pinky finger). Negative for rash.   Neurological: Negative for weakness and headaches.   Hematological: Does not bruise/bleed easily.       Physical Exam     Initial Vitals [02/02/19 1556]   BP Pulse Resp Temp SpO2   (!) 163/73 86 18 98.6 °F (37 °C) 96 %      MAP       --         Physical Exam    Nursing note and vitals reviewed.  Constitutional: She appears well-developed and well-nourished. She is not diaphoretic. No distress.   HENT:   Head: Normocephalic and atraumatic.   Eyes: Conjunctivae and EOM are normal. No scleral icterus.   Neck: Normal range of motion. Neck supple.   Cardiovascular: Normal rate, regular rhythm and normal heart sounds. Exam reveals no gallop and no friction rub.    No murmur heard.  Pulmonary/Chest: Breath sounds normal. No respiratory distress. She has no wheezes. She has no rhonchi. She has no rales.   Abdominal: Soft. Bowel sounds are normal. She exhibits no distension. There is no tenderness. There is no rebound and no guarding.   Musculoskeletal: Normal range of motion. She exhibits no edema or tenderness.   Left 5th digit with superficial laceration overlying VIP joint over plantar and dorsum with mild surrounding ecchymosis.   Neurological: She is alert and oriented to person, place, and time.   Skin: Skin is warm and dry. No rash noted. No erythema. No pallor.   Psychiatric: She has a normal mood  and affect. Her behavior is normal. Judgment and thought content normal.         ED Course   Procedures  Labs Reviewed - No data to display       Imaging Results          X-Ray Finger 2 or More Views Left (Final result)  Result time 02/02/19 17:37:32    Final result by Rosemarie Henao MD (02/02/19 17:37:32)                 Impression:      No acute osseous abnormality identified.      Electronically signed by: Rosemarie Henao MD  Date:    02/02/2019  Time:    17:37             Narrative:    EXAMINATION:  XR FINGER 2 OR MORE VIEWS LEFT    CLINICAL HISTORY:  finger injury;    COMPARISON:  None    FINDINGS:  No evidence of acute displaced fracture, dislocation, or osseous destructive process.  No radiopaque retained foreign body seen.                              X-Rays:   Independently Interpreted Readings:   Other Readings:  Left little finger: No acute findings.    Medical Decision Making:   Initial Assessment:   64 year old female with small laceration to left 5th digit. Plan cleaning, X-ray, tdap.  Clinical Tests:   Radiological Study: Reviewed and Ordered            Scribe Attestation:   Scribe #1: I performed the above scribed service and the documentation accurately describes the services I performed. I attest to the accuracy of the note.    Attending Attestation:           Physician Attestation for Scribe:  Physician Attestation Statement for Scribe #1: I, Dr. Rowell, reviewed documentation, as scribed by Jorgito Escoto in my presence, and it is both accurate and complete.                    Clinical Impression:     1. Crushing injury of left little finger, initial encounter                                   Quiana Rowell MD  02/03/19 1013

## 2019-02-02 NOTE — ED TRIAGE NOTES
"Pt arrived to ed with c/o cut to left pinky. Pt states " I smashed my finger into the door and I cut my finger. I am on blood thinners so I just wanted to come get checked out" no active bleeding noted. Pt unsure UTD on tetanus     "

## 2019-02-04 ENCOUNTER — TELEPHONE (OUTPATIENT)
Dept: RESEARCH | Facility: HOSPITAL | Age: 64
End: 2019-02-04

## 2019-02-04 NOTE — TELEPHONE ENCOUNTER
Called patient to follow up about rescheduling her appointments. No answer on either number provided in the EMR. No message left due to mailboxes being full. Will attempt to contact patient again.

## 2019-02-07 DIAGNOSIS — G45.1 HEMISPHERIC CAROTID ARTERY SYNDROME: Primary | ICD-10-CM

## 2019-02-07 DIAGNOSIS — Z00.6 EXAMINATION OF PARTICIPANT IN CLINICAL TRIAL: ICD-10-CM

## 2019-02-14 ENCOUNTER — LAB VISIT (OUTPATIENT)
Dept: LAB | Facility: HOSPITAL | Age: 64
End: 2019-02-14
Attending: INTERNAL MEDICINE
Payer: COMMERCIAL

## 2019-02-14 DIAGNOSIS — G45.1 HEMISPHERIC CAROTID ARTERY SYNDROME: ICD-10-CM

## 2019-02-14 DIAGNOSIS — Z00.6 EXAMINATION OF PARTICIPANT IN CLINICAL TRIAL: ICD-10-CM

## 2019-02-14 LAB
ALBUMIN SERPL BCP-MCNC: 4.1 G/DL
ALP SERPL-CCNC: 67 U/L
ALT SERPL W/O P-5'-P-CCNC: 11 U/L
ANION GAP SERPL CALC-SCNC: 7 MMOL/L
ANION GAP SERPL CALC-SCNC: 7 MMOL/L
AST SERPL-CCNC: 17 U/L
AST SERPL-CCNC: 17 U/L
BILIRUB SERPL-MCNC: 0.4 MG/DL
BUN SERPL-MCNC: 24 MG/DL
BUN SERPL-MCNC: 24 MG/DL
CALCIUM SERPL-MCNC: 10.2 MG/DL
CALCIUM SERPL-MCNC: 10.2 MG/DL
CHLORIDE SERPL-SCNC: 108 MMOL/L
CHLORIDE SERPL-SCNC: 108 MMOL/L
CHOLEST SERPL-MCNC: 147 MG/DL
CHOLEST/HDLC SERPL: 3.8 {RATIO}
CK SERPL-CCNC: 62 U/L
CO2 SERPL-SCNC: 26 MMOL/L
CO2 SERPL-SCNC: 26 MMOL/L
CREAT SERPL-MCNC: 1.2 MG/DL
CREAT SERPL-MCNC: 1.2 MG/DL
EST. GFR  (AFRICAN AMERICAN): 55.6 ML/MIN/1.73 M^2
EST. GFR  (AFRICAN AMERICAN): 55.6 ML/MIN/1.73 M^2
EST. GFR  (NON AFRICAN AMERICAN): 48.2 ML/MIN/1.73 M^2
EST. GFR  (NON AFRICAN AMERICAN): 48.2 ML/MIN/1.73 M^2
ESTIMATED AVG GLUCOSE: 154 MG/DL
GLUCOSE SERPL-MCNC: 95 MG/DL
GLUCOSE SERPL-MCNC: 95 MG/DL
HBA1C MFR BLD HPLC: 7 %
HDLC SERPL-MCNC: 39 MG/DL
HDLC SERPL: 26.5 %
LDLC SERPL CALC-MCNC: 90 MG/DL
NONHDLC SERPL-MCNC: 108 MG/DL
POTASSIUM SERPL-SCNC: 5.2 MMOL/L
POTASSIUM SERPL-SCNC: 5.2 MMOL/L
PROT SERPL-MCNC: 7.6 G/DL
SODIUM SERPL-SCNC: 141 MMOL/L
SODIUM SERPL-SCNC: 141 MMOL/L
TRIGL SERPL-MCNC: 90 MG/DL

## 2019-02-14 PROCEDURE — 36415 COLL VENOUS BLD VENIPUNCTURE: CPT | Mod: PO

## 2019-02-14 PROCEDURE — 83036 HEMOGLOBIN GLYCOSYLATED A1C: CPT

## 2019-02-14 PROCEDURE — 80061 LIPID PANEL: CPT

## 2019-02-14 PROCEDURE — 82550 ASSAY OF CK (CPK): CPT

## 2019-02-14 PROCEDURE — 80053 COMPREHEN METABOLIC PANEL: CPT

## 2019-02-26 ENCOUNTER — CLINICAL SUPPORT (OUTPATIENT)
Dept: CARDIOLOGY | Facility: CLINIC | Age: 64
End: 2019-02-26
Attending: INTERNAL MEDICINE

## 2019-02-26 ENCOUNTER — RESEARCH ENCOUNTER (OUTPATIENT)
Dept: RESEARCH | Facility: HOSPITAL | Age: 64
End: 2019-02-26

## 2019-02-26 ENCOUNTER — OFFICE VISIT (OUTPATIENT)
Dept: CARDIOLOGY | Facility: CLINIC | Age: 64
End: 2019-02-26
Payer: COMMERCIAL

## 2019-02-26 ENCOUNTER — TELEPHONE (OUTPATIENT)
Dept: CARDIOLOGY | Facility: CLINIC | Age: 64
End: 2019-02-26

## 2019-02-26 VITALS
SYSTOLIC BLOOD PRESSURE: 176 MMHG | HEART RATE: 77 BPM | DIASTOLIC BLOOD PRESSURE: 86 MMHG | OXYGEN SATURATION: 100 % | WEIGHT: 152.56 LBS | BODY MASS INDEX: 24.52 KG/M2 | HEIGHT: 66 IN

## 2019-02-26 DIAGNOSIS — E78.5 HYPERLIPIDEMIA, UNSPECIFIED HYPERLIPIDEMIA TYPE: ICD-10-CM

## 2019-02-26 DIAGNOSIS — G45.1 HEMISPHERIC CAROTID ARTERY SYNDROME: ICD-10-CM

## 2019-02-26 DIAGNOSIS — I10 ESSENTIAL HYPERTENSION: ICD-10-CM

## 2019-02-26 DIAGNOSIS — Z79.4 TYPE 2 DIABETES MELLITUS WITH OTHER SPECIFIED COMPLICATION, WITH LONG-TERM CURRENT USE OF INSULIN: Primary | ICD-10-CM

## 2019-02-26 DIAGNOSIS — E11.69 TYPE 2 DIABETES MELLITUS WITH OTHER SPECIFIED COMPLICATION, WITH LONG-TERM CURRENT USE OF INSULIN: Primary | ICD-10-CM

## 2019-02-26 DIAGNOSIS — I77.9 RIGHT-SIDED CAROTID ARTERY DISEASE, UNSPECIFIED TYPE: Primary | ICD-10-CM

## 2019-02-26 DIAGNOSIS — I67.9 CVD (CEREBROVASCULAR DISEASE): ICD-10-CM

## 2019-02-26 DIAGNOSIS — Z00.6 EXAMINATION OF PARTICIPANT IN CLINICAL TRIAL: ICD-10-CM

## 2019-02-26 LAB
LEFT CBA DIAS: 19 CM/S
LEFT CBA SYS: 71 CM/S
LEFT CCA DIST DIAS: 23 CM/S
LEFT CCA DIST SYS: 69 CM/S
LEFT CCA MID DIAS: 20 CM/S
LEFT CCA MID SYS: 58 CM/S
LEFT CCA PROX DIAS: 24 CM/S
LEFT CCA PROX SYS: 81 CM/S
LEFT ECA DIAS: 7 CM/S
LEFT ECA SYS: 62 CM/S
LEFT ICA DIST DIAS: 35 CM/S
LEFT ICA DIST SYS: 92 CM/S
LEFT ICA MID DIAS: 22 CM/S
LEFT ICA MID SYS: 62 CM/S
LEFT ICA PROX DIAS: 22 CM/S
LEFT ICA PROX SYS: 62 CM/S
LEFT VERTEBRAL DIAS: 19 CM/S
LEFT VERTEBRAL SYS: 50 CM/S
OHS CV CAROTID RIGHT ICA EDV HIGHEST: 106
OHS CV CAROTID ULTRASOUND LEFT ICA/CCA RATIO: 1.14
OHS CV CAROTID ULTRASOUND RIGHT ICA/CCA RATIO: 6.94
OHS CV PV CAROTID LEFT HIGHEST CCA: 81
OHS CV PV CAROTID LEFT HIGHEST ICA: 92
OHS CV PV CAROTID RIGHT HIGHEST CCA: 66
OHS CV PV CAROTID RIGHT HIGHEST ICA: 458
OHS CV US CAROTID LEFT HIGHEST EDV: 35
RIGHT ARM DIASTOLIC BLOOD PRESSURE: 86 MMHG
RIGHT ARM SYSTOLIC BLOOD PRESSURE: 176 MMHG
RIGHT CBA DIAS: 14 CM/S
RIGHT CBA SYS: 52 CM/S
RIGHT CCA DIST DIAS: 16 CM/S
RIGHT CCA DIST SYS: 55 CM/S
RIGHT CCA MID DIAS: 19 CM/S
RIGHT CCA MID SYS: 57 CM/S
RIGHT CCA PROX DIAS: 17 CM/S
RIGHT CCA PROX SYS: 66 CM/S
RIGHT ECA DIAS: 11 CM/S
RIGHT ECA SYS: 126 CM/S
RIGHT ICA DIST DIAS: 23 CM/S
RIGHT ICA DIST SYS: 59 CM/S
RIGHT ICA MID DIAS: 19 CM/S
RIGHT ICA MID SYS: 75 CM/S
RIGHT ICA PROX DIAS: 106 CM/S
RIGHT ICA PROX SYS: 458 CM/S
RIGHT VERTEBRAL DIAS: 15 CM/S
RIGHT VERTEBRAL SYS: 45 CM/S

## 2019-02-26 PROCEDURE — 93880 CV US DOPPLER CAROTID (CUPID ONLY): ICD-10-PCS | Mod: S$GLB,,, | Performed by: INTERNAL MEDICINE

## 2019-02-26 PROCEDURE — 3008F PR BODY MASS INDEX (BMI) DOCUMENTED: ICD-10-PCS | Mod: CPTII,S$GLB,, | Performed by: INTERNAL MEDICINE

## 2019-02-26 PROCEDURE — 99999 PR PBB SHADOW E&M-EST. PATIENT-LVL III: CPT | Mod: PBBFAC,,, | Performed by: INTERNAL MEDICINE

## 2019-02-26 PROCEDURE — 99215 PR OFFICE/OUTPT VISIT, EST, LEVL V, 40-54 MIN: ICD-10-PCS | Mod: S$GLB,,, | Performed by: INTERNAL MEDICINE

## 2019-02-26 PROCEDURE — 3008F BODY MASS INDEX DOCD: CPT | Mod: CPTII,S$GLB,, | Performed by: INTERNAL MEDICINE

## 2019-02-26 PROCEDURE — 99215 OFFICE O/P EST HI 40 MIN: CPT | Mod: S$GLB,,, | Performed by: INTERNAL MEDICINE

## 2019-02-26 PROCEDURE — 3079F PR MOST RECENT DIASTOLIC BLOOD PRESSURE 80-89 MM HG: ICD-10-PCS | Mod: CPTII,S$GLB,, | Performed by: INTERNAL MEDICINE

## 2019-02-26 PROCEDURE — 99999 PR PBB SHADOW E&M-EST. PATIENT-LVL III: ICD-10-PCS | Mod: PBBFAC,,, | Performed by: INTERNAL MEDICINE

## 2019-02-26 PROCEDURE — 93880 EXTRACRANIAL BILAT STUDY: CPT | Mod: S$GLB,,, | Performed by: INTERNAL MEDICINE

## 2019-02-26 PROCEDURE — 3079F DIAST BP 80-89 MM HG: CPT | Mod: CPTII,S$GLB,, | Performed by: INTERNAL MEDICINE

## 2019-02-26 PROCEDURE — 3077F SYST BP >= 140 MM HG: CPT | Mod: CPTII,S$GLB,, | Performed by: INTERNAL MEDICINE

## 2019-02-26 PROCEDURE — 3077F PR MOST RECENT SYSTOLIC BLOOD PRESSURE >= 140 MM HG: ICD-10-PCS | Mod: CPTII,S$GLB,, | Performed by: INTERNAL MEDICINE

## 2019-02-26 RX ORDER — ROSUVASTATIN CALCIUM 20 MG/1
20 TABLET, COATED ORAL DAILY
Qty: 30 TABLET | Refills: 11 | Status: SHIPPED | OUTPATIENT
Start: 2019-02-26 | End: 2020-06-11 | Stop reason: SDUPTHER

## 2019-02-26 RX ORDER — CARVEDILOL 25 MG/1
25 TABLET ORAL 2 TIMES DAILY WITH MEALS
Qty: 60 TABLET | Refills: 11 | Status: SHIPPED | OUTPATIENT
Start: 2019-02-26 | End: 2020-04-15 | Stop reason: SDUPTHER

## 2019-02-26 NOTE — ASSESSMENT & PLAN NOTE
Pt asymptomatic and denies any stroke/TIA symptoms.  Per carotid US, velocities remain unchanged from prior.  Pt compliant w/ medical therapy.  - continue current medical therapy  - continue evaluation per CREST 2 trial  - will continue to follow conservatively for now, b/l carotid US and f/u appt in 1 year

## 2019-02-26 NOTE — ASSESSMENT & PLAN NOTE
Continue statin therapy.  Pt mentions myalgias but desires to continue on atorvastatin for now.  If worsens, may consider changing to rosuvastatin.

## 2019-02-26 NOTE — PROGRESS NOTES
DONNA Singh    CREST 2 Study    Medical Management (only) arm     12 month follow up visit    Patient had carotid ultrasound    Visit with Interventional Cardiologist (Dr. Black)    Cognitive Phone Call with Study provide third party company      I met with CREST 2 patient who was randomized to medical management only arm of the study today to do a 12 month follow up visit. Patient presented in good spirits. We discussed her medications of which she is compliant and she stated no medication changes. Patient did state she has a blood pressure machine at home and her readings are never as high as her reading when she is here for her appointments. She had one emergency room visit due to slamming her finger in her truck door. She has not had any hospital stays.  During the visit a Karo and NIH stroke scales were conducted where both resulted in 0.   We went through all CREST 2 QVSS questionnaires.    Patient and I did a total of 4 blood pressure readings. One standing and three seated. The blood pressure readings were done on her right arm with the study provided device and the readings are as follows:     152/83;71    163/95;71    139/80;67    A mean reading of 151/84     Heart rate: 67    standing pressure of 148/76    The patient's weight during this visit is 69.2 kg.     Patient verbalize wanting to continue in the study and we discussed her 18 month follow up visit.     Patient is compensated a $95.00 stipend per participation in the CREST 2 study which is issued per [employer] system.    Dr. Lee will be notified of blood pressure readings; if any changes are required patient will be notified.

## 2019-02-26 NOTE — TELEPHONE ENCOUNTER
Blood pressure and LDL are above goal per CREST II protocol. Please change atorvastatin to rosuvastatin 20 mg daily due to myalgias and increase carvedilol to 25 mg twice daily. She will need fasting lipids and lft's in 6 weeks and a follow up research visit in 30 days to check her BP.

## 2019-02-26 NOTE — TELEPHONE ENCOUNTER
----- Message from Yuan Carter sent at 2/26/2019  2:38 PM CST -----  Regarding: CREST 2  I met with CREST 2 patient who was randomized to medical management only arm of the study to do a 12 month follow up visit. spirits. We discussed her medications of which she is compliant. Patient did state she has a blood pressure machine at home and her readings are never as high as her readings here for her.  During the visit a [place] and NIH stroke scales were conducted where both resulted in 0.        Patient and I did a total of 4 blood pressure readings. One standing and three seated. The blood pressure readings were done on her right arm with the study provided device and the readings are as follows:      152/83;71     163/95;71     139/80;67     A mean reading of 151/84      Heart rate: 67     standing pressure of 148/76           Patient had labs done on 02/14/2019

## 2019-02-26 NOTE — PROGRESS NOTES
Subjective:    Patient ID:  Julieth Hahn is a 64 y.o. female who presents for follow-up of R carotid artery stenosis.    HPI  64 yo F with PMHx of HTN, dyslipidemia, DM II on insulin, TIA (left arm sensory loss) referred by Dr. Perry for assessment of DOLORES stenosis.  She reports for f/u appt.  She denies any symptoms of stroke/TIA and is compliant w/ medical therapy.  She notes some mild myalgias and occasional L jaw pain at rest which is rare.  She denies any cp, sob, edema, orthopnea, PND, presyncope, palpitations, LOC, or claudication.  There were no other reports.       Review of Systems   Constitution: Negative for diaphoresis and fever.   HENT: Negative for congestion and hearing loss.    Eyes: Negative for blurred vision and pain.   Cardiovascular: Negative for chest pain, claudication, dyspnea on exertion, leg swelling, near-syncope, palpitations and syncope.   Respiratory: Negative for shortness of breath and sleep disturbances due to breathing.    Hematologic/Lymphatic: Negative for bleeding problem. Does not bruise/bleed easily.   Skin: Negative for color change and poor wound healing.   Musculoskeletal: Positive for myalgias.   Gastrointestinal: Negative for abdominal pain and nausea.   Genitourinary: Negative for bladder incontinence and flank pain.   Neurological: Negative for focal weakness and light-headedness.        Objective:    Physical Exam   Constitutional: She is oriented to person, place, and time. She appears well-developed and well-nourished.   HENT:   Head: Normocephalic and atraumatic.   Mouth/Throat: Oropharynx is clear and moist.   Eyes: EOM are normal. Pupils are equal, round, and reactive to light. No scleral icterus.   Neck: Normal range of motion. Neck supple. No JVD present.   Cardiovascular: Normal rate, regular rhythm, S1 normal, S2 normal and intact distal pulses. Exam reveals no gallop and no friction rub.   No murmur heard.  Pulses:       Carotid pulses are on the right  side with bruit.  Pulmonary/Chest: Effort normal and breath sounds normal. No respiratory distress. She has no wheezes. She has no rales. She exhibits no tenderness.   Abdominal: Soft. Bowel sounds are normal. She exhibits no distension and no mass. There is no tenderness. There is no rebound.   Musculoskeletal: Normal range of motion. She exhibits no edema or tenderness.   Neurological: She is alert and oriented to person, place, and time. She displays normal reflexes. Coordination normal.   Skin: Skin is warm and dry. She is not diaphoretic. No pallor.   Psychiatric: She has a normal mood and affect. Her behavior is normal. Judgment normal.         Assessment:       1. Right-sided carotid artery disease, unspecified type    2. Essential hypertension    3. Hyperlipidemia, unspecified hyperlipidemia type         Plan:       Right-sided carotid artery disease  Pt asymptomatic and denies any stroke/TIA symptoms.  Per carotid US, velocities remain unchanged from prior.  Pt compliant w/ medical therapy.  - continue current medical therapy  - continue evaluation per CREST 2 trial  - will continue to follow conservatively for now, b/l carotid US and f/u appt in 1 year    Essential hypertension  Elevated in clinic, however patient mentions that at home BP WNL.    - pt to continue to monitor at home and record and present at next PCP appt  - continue medical therapy and risk factor modification    Hyperlipidemia  Continue statin therapy.  Pt mentions myalgias but desires to continue on atorvastatin for now.  If worsens, may consider changing to rosuvastatin.          Baldo Marcos M.D.  Interventional Cardiology              I have personally taken the history and examined this patient. I have discussed and agree with the resident's findings and plan as documented in the resident's note. No cerebral symptoms. Continue OMT  Fam Black

## 2019-02-26 NOTE — ASSESSMENT & PLAN NOTE
Elevated in clinic, however patient mentions that at home BP WNL.    - pt to continue to monitor at home and record and present at next PCP appt  - continue medical therapy and risk factor modification

## 2019-02-28 ENCOUNTER — TELEPHONE (OUTPATIENT)
Dept: RESEARCH | Facility: HOSPITAL | Age: 64
End: 2019-02-28

## 2019-02-28 NOTE — TELEPHONE ENCOUNTER
Called patient to follow up about medication adjustments. Also, made sure patient had access to cardiology clinic phone number. Patient stated she did have number and verbalized understanding of all medication changes. Patient was informed I will follow up to schedule CREST 2 blood pressure re-evaluation.

## 2019-03-19 ENCOUNTER — TELEPHONE (OUTPATIENT)
Dept: RESEARCH | Facility: HOSPITAL | Age: 64
End: 2019-03-19

## 2019-03-19 NOTE — TELEPHONE ENCOUNTER
Called patient to schedule CREST 2 blood pressure re-evaluation. Left a message and number for patient to get back in contact.

## 2019-03-27 ENCOUNTER — TELEPHONE (OUTPATIENT)
Dept: CARDIOLOGY | Facility: CLINIC | Age: 64
End: 2019-03-27

## 2019-03-27 ENCOUNTER — RESEARCH ENCOUNTER (OUTPATIENT)
Dept: RESEARCH | Facility: HOSPITAL | Age: 64
End: 2019-03-27

## 2019-03-27 NOTE — PROGRESS NOTES
DONNA Singh     CREST 2 Study     Medical Management (only) arm      30 day blood pressure follow up visit       I met with CREST 2 patient who was randomized to medical management only arm of the study for a 30 day blood pressure re-evaluation visit. Patient presented in good spirits. We discussed her medications of which she is compliant and she stated no medication changes.     Patient and I did a total of 4 blood pressure readings. One standing and three seated. The blood pressure readings were done on her right arm with the study provided device and the readings are as follows:      127/66  120/67  138/72    A mean reading of 128/68      Heart rate: 72     standing pressure of 118/70     Patient verbalize wanting to continue in the study and we discussed her 18 month follow up visit.      Patient is compensated a $95.00 stipend per participation in the CREST 2 study which is issued per [employer] system.     Dr. Lee will be notified of blood pressure readings; if any changes are required patient will be notified.

## 2019-08-15 ENCOUNTER — RESEARCH ENCOUNTER (OUTPATIENT)
Dept: RESEARCH | Facility: HOSPITAL | Age: 64
End: 2019-08-15

## 2019-08-15 ENCOUNTER — TELEPHONE (OUTPATIENT)
Dept: CARDIOLOGY | Facility: CLINIC | Age: 64
End: 2019-08-15

## 2019-08-15 NOTE — TELEPHONE ENCOUNTER
----- Message from Yuan Carter sent at 8/15/2019  2:30 PM CDT -----  Regarding: [facility] 2  DONNA Singh     [facility] 2 Study     Medical Management (only) arm      18 month follow up visit        I met with [facility] 2 patient who was randomized to medical management only arm of the study today to do a 18 month follow up visit. Patient presented in good spirits. We discussed her medications of which she is compliant and she stated no medication changes. She has not had any emergency room visits. She has not had any hospital stays.  During the visit a [place] and NIH stroke scales were conducted where both resulted in 0.      We went through all [facility] 2 QVSS questionnaires.     Patient and I did a total of 4 blood pressure readings. One standing and three seated. The blood pressure readings were done on her right arm with the study provided device and the readings are as follows:      120/73;76     137/74;72     112/75;73     A mean reading of 123/74      Heart rate: 72     standing pressure of 104/67     The patient's weight during this visit is 68.9 kg.      Patient verbalize wanting to continue in the study and we discussed her 18 month follow up visit.

## 2019-09-06 NOTE — TELEPHONE ENCOUNTER
----- Message from Kyara Glynn sent at 3/29/2018 12:07 PM CDT -----  Contact: patient  Please call pt at [phone] or [phone]. Returning your call from yesterday    Thank you  
Dr Lee wants this patient to stop her Losartan and go up on her Plendil from 5 mg to 10 mg daily.  And we will recheck her blood pressures when she gets back from her daughters house.  
LACERATION

## 2019-12-03 DIAGNOSIS — I10 ESSENTIAL HYPERTENSION: ICD-10-CM

## 2019-12-03 DIAGNOSIS — E87.5 HYPERKALEMIA: ICD-10-CM

## 2019-12-03 RX ORDER — CHLORTHALIDONE 50 MG/1
TABLET ORAL
Qty: 30 TABLET | Refills: 6 | OUTPATIENT
Start: 2019-12-03

## 2019-12-10 DIAGNOSIS — E87.5 HYPERKALEMIA: ICD-10-CM

## 2019-12-10 DIAGNOSIS — I10 ESSENTIAL HYPERTENSION: ICD-10-CM

## 2019-12-10 RX ORDER — CHLORTHALIDONE 50 MG/1
50 TABLET ORAL DAILY
Qty: 30 TABLET | Refills: 11 | Status: SHIPPED | OUTPATIENT
Start: 2019-12-10 | End: 2021-01-06 | Stop reason: SDUPTHER

## 2020-01-17 ENCOUNTER — TELEPHONE (OUTPATIENT)
Dept: CARDIOLOGY | Facility: CLINIC | Age: 65
End: 2020-01-17

## 2020-01-17 NOTE — TELEPHONE ENCOUNTER
----- Message from Yuan Carter sent at 1/17/2020 11:13 AM CST -----  Regarding: Blood Pressure  Good morning,    I received a call from Ms. Hahn concerned with her blood pressure being too low. She stated she has not taken any of her blood pressure medication yesterday or today for fear her blood pressure would drop too low. She stated she has not been lightheaded or dizzy except once on yesterday. When her blood pressure was checked at that time it was sys in the low 120's. She stated she has had her systolic as low as 100 and diastolic as low as 58. While we were on the phone she took a reading and it was 124/69 pulse of 71. Please advise how I should follow up with the patient. Thank you in advance for your time.    Kindly,    Yuan

## 2020-01-21 NOTE — TELEPHONE ENCOUNTER
She could start by cutting the chlorthalidone in half and monitoring her pressure for a few days. If she is getting lightheaded she should call us.

## 2020-01-23 ENCOUNTER — RESEARCH ENCOUNTER (OUTPATIENT)
Dept: RESEARCH | Facility: HOSPITAL | Age: 65
End: 2020-01-23

## 2020-01-23 NOTE — PROGRESS NOTES
Called patient to follow up on previous phone call. Patient stated she was feeling well and taking all medications. She stated she will call if she feels you needs to do so at any time.

## 2020-01-30 DIAGNOSIS — Z00.6 EXAMINATION OF PARTICIPANT IN CLINICAL TRIAL: ICD-10-CM

## 2020-01-30 DIAGNOSIS — I77.9 RIGHT-SIDED CAROTID ARTERY DISEASE, UNSPECIFIED TYPE: Primary | ICD-10-CM

## 2020-02-13 ENCOUNTER — HOSPITAL ENCOUNTER (EMERGENCY)
Facility: HOSPITAL | Age: 65
Discharge: HOME | End: 2020-02-13
Attending: EMERGENCY MEDICINE
Payer: COMMERCIAL

## 2020-02-13 VITALS
HEIGHT: 66 IN | WEIGHT: 153 LBS | OXYGEN SATURATION: 99 % | HEART RATE: 84 BPM | BODY MASS INDEX: 24.59 KG/M2 | RESPIRATION RATE: 18 BRPM | DIASTOLIC BLOOD PRESSURE: 68 MMHG | TEMPERATURE: 98 F | SYSTOLIC BLOOD PRESSURE: 144 MMHG

## 2020-02-13 DIAGNOSIS — R19.7 DIARRHEA, UNSPECIFIED TYPE: Primary | ICD-10-CM

## 2020-02-13 LAB — POCT GLUCOSE: 171 MG/DL (ref 70–110)

## 2020-02-13 PROCEDURE — 25000003 PHARM REV CODE 250: Performed by: PHYSICIAN ASSISTANT

## 2020-02-13 PROCEDURE — 99283 EMERGENCY DEPT VISIT LOW MDM: CPT | Mod: 25

## 2020-02-13 PROCEDURE — 82962 GLUCOSE BLOOD TEST: CPT

## 2020-02-13 RX ORDER — LOPERAMIDE HYDROCHLORIDE 2 MG/1
CAPSULE ORAL
Qty: 20 CAPSULE | Refills: 0 | Status: SHIPPED | OUTPATIENT
Start: 2020-02-13 | End: 2021-07-02

## 2020-02-13 RX ORDER — LOPERAMIDE HYDROCHLORIDE 2 MG/1
CAPSULE ORAL
Qty: 20 CAPSULE | Refills: 0 | OUTPATIENT
Start: 2020-02-13 | End: 2020-02-13 | Stop reason: SDUPTHER

## 2020-02-13 RX ORDER — LOPERAMIDE HYDROCHLORIDE 2 MG/1
4 CAPSULE ORAL
Status: COMPLETED | OUTPATIENT
Start: 2020-02-13 | End: 2020-02-13

## 2020-02-13 RX ADMIN — LOPERAMIDE HYDROCHLORIDE 4 MG: 2 CAPSULE ORAL at 05:02

## 2020-02-13 NOTE — DISCHARGE INSTRUCTIONS
Follow-up with your Gastroenterologist if your symptoms persist. Imodium as prescribed to help with loose stools.     Please return to the ED if you begin with abdominal pain, if you begin with fever, if any weakness or fatigue, if decreased appetite, if any other problems occur.

## 2020-02-13 NOTE — ED PROVIDER NOTES
Encounter Date: 2/13/2020       History     Chief Complaint   Patient presents with    Diarrhea     x 4 days; woke her out of sleep this morning; denies abdominal pain     66yo F with pmh R sided carotid disease, IDDM, HLD, HTN, with chief complaint nonbloody loose stools x 4 days. No fever. No abdominal pain. No abdominal distension. No urinary complaints or vaginal complaints. No fever/chills. No change in appetite or PO intake. No neck pain/stiffness. No recent illness or sick contacts. No cough. No fatigue, no weakness, no lightheadedness/dizziness.    No hematochezia or melena. No hx GI bleed. No excessive NSAID use.        Review of patient's allergies indicates:   Allergen Reactions    Atorvastatin      myalgias     Past Medical History:   Diagnosis Date    Carotid artery bruit     Diabetes mellitus     High cholesterol     Hypertension      History reviewed. No pertinent surgical history.  Family History   Problem Relation Age of Onset    Diabetes Mother     Hepatitis Mother     No Known Problems Father     No Known Problems Sister     No Known Problems Brother     COPD Maternal Aunt     No Known Problems Maternal Uncle     No Known Problems Paternal Aunt     No Known Problems Paternal Uncle     Diabetes Maternal Grandmother     No Known Problems Maternal Grandfather     No Known Problems Paternal Grandmother     No Known Problems Paternal Grandfather     Anemia Neg Hx     Arrhythmia Neg Hx     Asthma Neg Hx     Clotting disorder Neg Hx     Fainting Neg Hx     Heart attack Neg Hx     Heart disease Neg Hx     Heart failure Neg Hx     Hyperlipidemia Neg Hx     Hypertension Neg Hx     Stroke Neg Hx     Atrial Septal Defect Neg Hx      Social History     Tobacco Use    Smoking status: Never Smoker    Smokeless tobacco: Never Used   Substance Use Topics    Alcohol use: No     Alcohol/week: 0.0 standard drinks    Drug use: No     Review of Systems   Constitutional: Negative for  appetite change and fever.   HENT: Negative for dental problem, rhinorrhea, sore throat and trouble swallowing.    Eyes: Negative for discharge and redness.   Respiratory: Negative for cough and shortness of breath.    Cardiovascular: Negative for chest pain and leg swelling.   Gastrointestinal: Positive for diarrhea. Negative for abdominal pain, blood in stool, constipation, nausea and vomiting.   Genitourinary: Negative for dysuria, flank pain and urgency.   Musculoskeletal: Negative for back pain, myalgias, neck pain and neck stiffness.   Skin: Negative for rash.   Neurological: Negative for dizziness, light-headedness and headaches.       Physical Exam     Initial Vitals [02/13/20 0357]   BP Pulse Resp Temp SpO2   (!) 144/68 84 18 97.9 °F (36.6 °C) 99 %      MAP       --         Physical Exam    Nursing note and vitals reviewed.  Constitutional: She appears well-developed and well-nourished. She is not diaphoretic. No distress.   Well-appearing, nontoxic. Resting comfortably on exam table.   HENT:   Head: Normocephalic and atraumatic.   Mouth/Throat: Oropharynx is clear and moist.   Eyes: EOM are normal.   Neck: Neck supple.   Cardiovascular: Intact distal pulses.   Pulmonary/Chest: No respiratory distress.   Abdominal:   Abdomen overall soft, normal bowel sounds ×4.  No significant tenderness to palpation of any quadrant.  No rebound or guarding.  No palpable mass or distention.  No flank or CVA tenderness.  Negative Haji sign.  No pain over McBurney's point.   Neurological: She is alert and oriented to person, place, and time.   Skin: Skin is warm. Capillary refill takes less than 2 seconds.   Psychiatric: She has a normal mood and affect. Thought content normal.         ED Course   Procedures  Labs Reviewed   POCT GLUCOSE - Abnormal; Notable for the following components:       Result Value    POCT Glucose 171 (*)     All other components within normal limits          Imaging Results    None          Medical  Decision Making:   Differential Diagnosis:   Viral illness, C difficile, gastroenteritis  ED Management:  Patient plans to follow up with her gastroenterologist.  This has occurred in the past.  Is no evidence of significant dehydration.  She continues with normal appetite p.o. intake.  There is no abdominal pain.  There is no abdominal tenderness.  Unable to provide stool sample.  Supportive measures with Imodium.  Return precautions given.    Low suspicion for mesenteric ischemia, emergent process, surgical abdomen.                                 Clinical Impression:       ICD-10-CM ICD-9-CM   1. Diarrhea, unspecified type R19.7 787.91         Disposition:   Disposition: Discharged  Condition: Stable                     Tad Roblero PA-C  02/13/20 0757

## 2020-02-26 ENCOUNTER — CLINICAL SUPPORT (OUTPATIENT)
Dept: CARDIOLOGY | Facility: CLINIC | Age: 65
End: 2020-02-26
Attending: INTERNAL MEDICINE
Payer: COMMERCIAL

## 2020-02-26 DIAGNOSIS — I77.9 RIGHT-SIDED CAROTID ARTERY DISEASE, UNSPECIFIED TYPE: ICD-10-CM

## 2020-02-26 DIAGNOSIS — Z00.6 EXAMINATION OF PARTICIPANT IN CLINICAL TRIAL: ICD-10-CM

## 2020-02-26 LAB
LEFT CBA DIAS: 11 CM/S
LEFT CBA SYS: 61 CM/S
LEFT CCA DIST DIAS: 22 CM/S
LEFT CCA DIST SYS: 65 CM/S
LEFT CCA MID DIAS: 18 CM/S
LEFT CCA MID SYS: 71 CM/S
LEFT CCA PROX DIAS: 24 CM/S
LEFT CCA PROX SYS: 94 CM/S
LEFT ECA DIAS: 0 CM/S
LEFT ECA SYS: 66 CM/S
LEFT ICA DIST DIAS: 26 CM/S
LEFT ICA DIST SYS: 65 CM/S
LEFT ICA MID DIAS: 28 CM/S
LEFT ICA MID SYS: 70 CM/S
LEFT ICA PROX DIAS: 16 CM/S
LEFT ICA PROX SYS: 56 CM/S
LEFT VERTEBRAL DIAS: 20 CM/S
LEFT VERTEBRAL SYS: 54 CM/S
OHS CV CAROTID RIGHT ICA EDV HIGHEST: 84
OHS CV CAROTID ULTRASOUND LEFT ICA/CCA RATIO: 0.74
OHS CV CAROTID ULTRASOUND RIGHT ICA/CCA RATIO: 5.46
OHS CV PV CAROTID LEFT HIGHEST CCA: 94
OHS CV PV CAROTID LEFT HIGHEST ICA: 70
OHS CV PV CAROTID RIGHT HIGHEST CCA: 70
OHS CV PV CAROTID RIGHT HIGHEST ICA: 382
OHS CV US CAROTID LEFT HIGHEST EDV: 28
RIGHT CBA DIAS: 15 CM/S
RIGHT CBA SYS: 63 CM/S
RIGHT CCA DIST DIAS: 14 CM/S
RIGHT CCA DIST SYS: 51 CM/S
RIGHT CCA MID DIAS: 11 CM/S
RIGHT CCA MID SYS: 57 CM/S
RIGHT CCA PROX DIAS: 15 CM/S
RIGHT CCA PROX SYS: 70 CM/S
RIGHT ECA DIAS: 0 CM/S
RIGHT ECA SYS: 53 CM/S
RIGHT ICA DIST DIAS: 24 CM/S
RIGHT ICA DIST SYS: 53 CM/S
RIGHT ICA MID DIAS: 29 CM/S
RIGHT ICA MID SYS: 86 CM/S
RIGHT ICA PROX DIAS: 84 CM/S
RIGHT ICA PROX SYS: 382 CM/S
RIGHT VERTEBRAL DIAS: 15 CM/S
RIGHT VERTEBRAL SYS: 46 CM/S

## 2020-02-26 PROCEDURE — 93880 CV US DOPPLER CAROTID (CUPID ONLY): ICD-10-PCS | Mod: Q1,S$GLB,, | Performed by: INTERNAL MEDICINE

## 2020-02-26 PROCEDURE — 93880 EXTRACRANIAL BILAT STUDY: CPT | Mod: Q1,S$GLB,, | Performed by: INTERNAL MEDICINE

## 2020-03-03 ENCOUNTER — OFFICE VISIT (OUTPATIENT)
Dept: CARDIOLOGY | Facility: CLINIC | Age: 65
End: 2020-03-03
Payer: COMMERCIAL

## 2020-03-03 ENCOUNTER — RESEARCH ENCOUNTER (OUTPATIENT)
Dept: RESEARCH | Facility: HOSPITAL | Age: 65
End: 2020-03-03

## 2020-03-03 ENCOUNTER — LAB VISIT (OUTPATIENT)
Dept: LAB | Facility: HOSPITAL | Age: 65
End: 2020-03-03
Attending: INTERNAL MEDICINE
Payer: COMMERCIAL

## 2020-03-03 ENCOUNTER — TELEPHONE (OUTPATIENT)
Dept: CARDIOLOGY | Facility: CLINIC | Age: 65
End: 2020-03-03

## 2020-03-03 VITALS
SYSTOLIC BLOOD PRESSURE: 124 MMHG | HEIGHT: 66 IN | HEART RATE: 66 BPM | OXYGEN SATURATION: 97 % | DIASTOLIC BLOOD PRESSURE: 70 MMHG | BODY MASS INDEX: 24.17 KG/M2 | WEIGHT: 150.38 LBS

## 2020-03-03 DIAGNOSIS — Z00.6 EXAMINATION OF PARTICIPANT IN CLINICAL TRIAL: ICD-10-CM

## 2020-03-03 DIAGNOSIS — Z79.4 TYPE 2 DIABETES MELLITUS WITH OTHER SPECIFIED COMPLICATION, WITH LONG-TERM CURRENT USE OF INSULIN: ICD-10-CM

## 2020-03-03 DIAGNOSIS — E78.5 HYPERLIPIDEMIA, UNSPECIFIED HYPERLIPIDEMIA TYPE: ICD-10-CM

## 2020-03-03 DIAGNOSIS — E87.5 HYPERKALEMIA: Primary | ICD-10-CM

## 2020-03-03 DIAGNOSIS — I77.9 RIGHT-SIDED CAROTID ARTERY DISEASE, UNSPECIFIED TYPE: Primary | ICD-10-CM

## 2020-03-03 DIAGNOSIS — I77.9 RIGHT-SIDED CAROTID ARTERY DISEASE, UNSPECIFIED TYPE: ICD-10-CM

## 2020-03-03 DIAGNOSIS — E11.69 TYPE 2 DIABETES MELLITUS WITH OTHER SPECIFIED COMPLICATION, WITH LONG-TERM CURRENT USE OF INSULIN: ICD-10-CM

## 2020-03-03 DIAGNOSIS — I10 ESSENTIAL HYPERTENSION: ICD-10-CM

## 2020-03-03 LAB
ALT SERPL W/O P-5'-P-CCNC: 21 U/L (ref 10–44)
AST SERPL-CCNC: 22 U/L (ref 10–40)
BASOPHILS # BLD AUTO: 0.02 K/UL (ref 0–0.2)
BASOPHILS NFR BLD: 0.3 % (ref 0–1.9)
CHOLEST SERPL-MCNC: 122 MG/DL (ref 120–199)
CHOLEST/HDLC SERPL: 3.3 {RATIO} (ref 2–5)
CK SERPL-CCNC: 66 U/L (ref 20–180)
DIFFERENTIAL METHOD: ABNORMAL
EOSINOPHIL # BLD AUTO: 0.1 K/UL (ref 0–0.5)
EOSINOPHIL NFR BLD: 1.2 % (ref 0–8)
ERYTHROCYTE [DISTWIDTH] IN BLOOD BY AUTOMATED COUNT: 12.8 % (ref 11.5–14.5)
ESTIMATED AVG GLUCOSE: 197 MG/DL (ref 68–131)
HBA1C MFR BLD HPLC: 8.5 % (ref 4–5.6)
HCT VFR BLD AUTO: 32.3 % (ref 37–48.5)
HDLC SERPL-MCNC: 37 MG/DL (ref 40–75)
HDLC SERPL: 30.3 % (ref 20–50)
HGB BLD-MCNC: 9.5 G/DL (ref 12–16)
IMM GRANULOCYTES # BLD AUTO: 0.02 K/UL (ref 0–0.04)
IMM GRANULOCYTES NFR BLD AUTO: 0.3 % (ref 0–0.5)
LDLC SERPL CALC-MCNC: 62 MG/DL (ref 63–159)
LYMPHOCYTES # BLD AUTO: 1.5 K/UL (ref 1–4.8)
LYMPHOCYTES NFR BLD: 21.3 % (ref 18–48)
MCH RBC QN AUTO: 26.8 PG (ref 27–31)
MCHC RBC AUTO-ENTMCNC: 29.4 G/DL (ref 32–36)
MCV RBC AUTO: 91 FL (ref 82–98)
MONOCYTES # BLD AUTO: 0.6 K/UL (ref 0.3–1)
MONOCYTES NFR BLD: 9.3 % (ref 4–15)
NEUTROPHILS # BLD AUTO: 4.6 K/UL (ref 1.8–7.7)
NEUTROPHILS NFR BLD: 67.6 % (ref 38–73)
NONHDLC SERPL-MCNC: 85 MG/DL
NRBC BLD-RTO: 0 /100 WBC
PLATELET # BLD AUTO: 264 K/UL (ref 150–350)
PMV BLD AUTO: 10.2 FL (ref 9.2–12.9)
RBC # BLD AUTO: 3.55 M/UL (ref 4–5.4)
TRIGL SERPL-MCNC: 115 MG/DL (ref 30–150)
WBC # BLD AUTO: 6.8 K/UL (ref 3.9–12.7)

## 2020-03-03 PROCEDURE — 83036 HEMOGLOBIN GLYCOSYLATED A1C: CPT

## 2020-03-03 PROCEDURE — 99212 PR OFFICE/OUTPT VISIT, EST, LEVL II, 10-19 MIN: ICD-10-PCS | Mod: S$GLB,,, | Performed by: INTERNAL MEDICINE

## 2020-03-03 PROCEDURE — 3074F PR MOST RECENT SYSTOLIC BLOOD PRESSURE < 130 MM HG: ICD-10-PCS | Mod: CPTII,S$GLB,, | Performed by: INTERNAL MEDICINE

## 2020-03-03 PROCEDURE — 3008F BODY MASS INDEX DOCD: CPT | Mod: CPTII,S$GLB,, | Performed by: INTERNAL MEDICINE

## 2020-03-03 PROCEDURE — 3074F SYST BP LT 130 MM HG: CPT | Mod: CPTII,S$GLB,, | Performed by: INTERNAL MEDICINE

## 2020-03-03 PROCEDURE — 80053 COMPREHEN METABOLIC PANEL: CPT

## 2020-03-03 PROCEDURE — 3008F PR BODY MASS INDEX (BMI) DOCUMENTED: ICD-10-PCS | Mod: CPTII,S$GLB,, | Performed by: INTERNAL MEDICINE

## 2020-03-03 PROCEDURE — 99212 OFFICE O/P EST SF 10 MIN: CPT | Mod: S$GLB,,, | Performed by: INTERNAL MEDICINE

## 2020-03-03 PROCEDURE — 3078F PR MOST RECENT DIASTOLIC BLOOD PRESSURE < 80 MM HG: ICD-10-PCS | Mod: CPTII,S$GLB,, | Performed by: INTERNAL MEDICINE

## 2020-03-03 PROCEDURE — 36415 COLL VENOUS BLD VENIPUNCTURE: CPT

## 2020-03-03 PROCEDURE — 85025 COMPLETE CBC W/AUTO DIFF WBC: CPT

## 2020-03-03 PROCEDURE — 99999 PR PBB SHADOW E&M-EST. PATIENT-LVL III: ICD-10-PCS | Mod: PBBFAC,,, | Performed by: INTERNAL MEDICINE

## 2020-03-03 PROCEDURE — 99999 PR PBB SHADOW E&M-EST. PATIENT-LVL III: CPT | Mod: PBBFAC,,, | Performed by: INTERNAL MEDICINE

## 2020-03-03 PROCEDURE — 3078F DIAST BP <80 MM HG: CPT | Mod: CPTII,S$GLB,, | Performed by: INTERNAL MEDICINE

## 2020-03-03 PROCEDURE — 80061 LIPID PANEL: CPT

## 2020-03-03 PROCEDURE — 82550 ASSAY OF CK (CPK): CPT

## 2020-03-03 NOTE — PROGRESS NOTES
Interventional Cardiology Clinic Note  Reason for Visit: R carotid artery stenosis   Referring Physician: Dr. Perry    HPI:   Pt  Is a 65 year old lady who is here for follow up of the Right internal carotid stenosis.     She has hypertension, diabetes who was referred from Dr. Perry for DOLORES stenosis. She is currently asymptomatic without any TIA/CVA, dizziness, lightheadedness, visual changes. She is compliant with her medications and has no problems.     Review of Systems   All other systems reviewed and are negative.      PMH:     Past Medical History:   Diagnosis Date    Carotid artery bruit     Diabetes mellitus     High cholesterol     Hypertension      History reviewed. No pertinent surgical history.  Allergies:     Review of patient's allergies indicates:   Allergen Reactions    Atorvastatin      myalgias     Medications:     Current Outpatient Medications on File Prior to Visit   Medication Sig Dispense Refill    aspirin (ECOTRIN) 325 MG EC tablet Take 1 tablet (325 mg total) by mouth once daily. 30 tablet 11    chlorthalidone (HYGROTEN) 50 MG Tab Take 1 tablet (50 mg total) by mouth once daily. 30 tablet 11    INSULIN DETEMIR (LEVEMIR SUBQ) Inject 17 Units into the skin.      loperamide (IMODIUM) 2 mg capsule 2 mg after each loose stool (maximum: 12 mg/day) 20 capsule 0    pantoprazole (PROTONIX) 40 MG tablet Take 40 mg by mouth once daily.       sitagliptan-metformin (JANUMET) 50-1,000 mg per tablet Take 1 tablet by mouth 2 (two) times daily with meals.       carvedilol (COREG) 25 MG tablet Take 1 tablet (25 mg total) by mouth 2 (two) times daily with meals. 60 tablet 11    felodipine (PLENDIL) 10 MG 24 hr tablet Take 1 tablet (10 mg total) by mouth once daily. 30 tablet 11    rosuvastatin (CRESTOR) 20 MG tablet Take 1 tablet (20 mg total) by mouth once daily. 30 tablet 11     No current facility-administered medications on file prior to visit.      Social History:     Social  "History     Tobacco Use    Smoking status: Never Smoker    Smokeless tobacco: Never Used   Substance Use Topics    Alcohol use: No     Alcohol/week: 0.0 standard drinks     Family History:     Family History   Problem Relation Age of Onset    Diabetes Mother     Hepatitis Mother     No Known Problems Father     No Known Problems Sister     No Known Problems Brother     COPD Maternal Aunt     No Known Problems Maternal Uncle     No Known Problems Paternal Aunt     No Known Problems Paternal Uncle     Diabetes Maternal Grandmother     No Known Problems Maternal Grandfather     No Known Problems Paternal Grandmother     No Known Problems Paternal Grandfather     Anemia Neg Hx     Arrhythmia Neg Hx     Asthma Neg Hx     Clotting disorder Neg Hx     Fainting Neg Hx     Heart attack Neg Hx     Heart disease Neg Hx     Heart failure Neg Hx     Hyperlipidemia Neg Hx     Hypertension Neg Hx     Stroke Neg Hx     Atrial Septal Defect Neg Hx        Physical Exam  /70 (BP Location: Right arm, Patient Position: Sitting, BP Method: Large (Automatic))   Pulse 66   Ht 5' 6" (1.676 m)   Wt 68.2 kg (150 lb 5.7 oz)   SpO2 97%   BMI 24.27 kg/m²    GEN: Alert and oriented in NAD  NECK: no JVD appreciated, right sided carotid bruit noted.   CVS: RRR, s1/s2, no MRG  PULM: CTAB no rales  ABD: NT/ND BS +  Extremities: warm and dry, palpable pulses, no edema  NEURO: Alert and oriented x 3  PSYCH: appropriate affect.             Labs:     Lab Results   Component Value Date     02/14/2019     02/14/2019    K 5.2 (H) 02/14/2019    K 5.2 (H) 02/14/2019     02/14/2019     02/14/2019    CO2 26 02/14/2019    CO2 26 02/14/2019    BUN 24 (H) 02/14/2019    BUN 24 (H) 02/14/2019    CREATININE 1.2 02/14/2019    CREATININE 1.2 02/14/2019    ANIONGAP 7 (L) 02/14/2019    ANIONGAP 7 (L) 02/14/2019     Lab Results   Component Value Date    HGBA1C 7.0 (H) 02/14/2019     Lab Results   Component " Value Date    BNP 99 07/10/2017    BNP 30 03/14/2017    BNP 26 09/16/2016    Lab Results   Component Value Date    WBC 5.16 02/06/2018    HGB 9.0 (L) 02/06/2018    HCT 29.3 (L) 02/06/2018     02/06/2018    GRAN 3.0 02/06/2018    GRAN 57.6 02/06/2018     Lab Results   Component Value Date    CHOL 147 02/14/2019    HDL 39 (L) 02/14/2019    LDLCALC 90.0 02/14/2019    TRIG 90 02/14/2019          No results found for: EF      Assessment and Plan  Julieth Hahn is a 65 y.o. here for DOLORES stenosis.     1. Right-sided carotid artery disease, unspecified type   Pt with asymptomatic carotid disease with 80-99% stenosis on most recent ultrasound. She is on GDMT and tolerating this well will continue to follow each year for US    2. Essential hypertension   BP is well controlled    3. Type 2 diabetes mellitus with other specified complication, with long-term current use of insulin   Management per primary.    4. Hyperlipidemia, unspecified hyperlipidemia type   Continue high intensity statin.       Plan  Carotid ultrasound in 1 year with follow up  Continue high intensity statin        Signed:        Saroj Calvillo MD  Cardiology Fellow  Pager [phone]    Interventional Cardiology Staff  I have personally taken the history and examined this patient. I have discussed and agree with the resident's findings and plan as documented in the resident's note.     Patient returns for year 2 CREST II followup.  She is completely asymptomatic.  Carotid ultrasound demonstrates greater than 80% right internal carotid artery stenosis and left carotid artery patent.  Follow-up in 1 year with carotid ultrasound or earlier if any symptoms occur.  Extended time was spent discussing with patient symptoms of right carotid artery TIA or stroke.  Fam Black

## 2020-03-03 NOTE — TELEPHONE ENCOUNTER
----- Message from Yuan Carter sent at 3/3/2020  2:03 PM CST -----  Regarding: [place] 2  DONNA Singh     [facility] 2 Study     Medical Management (only) arm      24 month follow up visit     Patient had carotid ultrasound     Visit with Interventional Cardiologist (Dr. Black)     [employer] Phone Call with Study provide third party company        I met with [facility] 2 patient who was randomized to medical management only arm of the study today to do a 24 month follow up visit. Patient presented in good spirits. We discussed her medications of which she is compliant and she stated no medication changes. Patient did state she has a blood pressure machine at home. She stated had ane emergency room due to constant running to the bathroom. She has not had any hospital stays.  During the visit a [place] and NIH stroke scales were conducted where both resulted in 0.   We went through all [facility] 2 QVSS questionnaires.     Patient and I did a total of 4 blood pressure readings. One standing and three seated. The blood pressure readings were done on her right arm with the study provided device and the readings are as follows:      128/74;66     127/74;66     132/78;67     A mean reading of 129/75      Heart rate: 66     standing pressure of 122/64;68     The patient's weight during this visit is 68.2 kg.

## 2020-03-03 NOTE — TELEPHONE ENCOUNTER
Blood pressure is at goal per CREST II protocol. She has been hyperkalemic for the past year and needs to see her PCP and nephrology. I dont see who she is following with here. Please ask her to avoid potassium rich foods such as bananas, potatoes, citrus fruits and avocados as well as potassium containing salt substitutes. Please repeat a potassium level on Friday.

## 2020-03-03 NOTE — PROGRESS NOTES
DONNA Singh     [facility]ST 2 Study     Medical Management (only) arm      24 month follow up visit     Patient had carotid ultrasound     Visit with Interventional Cardiologist (Dr. Black)     [employer] Phone Call with Study provide third party company        I met with CREST 2 patient who was randomized to medical management only arm of the study today to do a 24 month follow up visit. Patient presented in good spirits. We discussed her medications of which she is compliant and she stated no medication changes. Patient did state she has a blood pressure machine at home. She stated had ane emergency room due to constant running to the bathroom. She has not had any hospital stays.  During the visit a [place] and NIH stroke scales were conducted where both resulted in 0.   We went through all CREST 2 QVSS questionnaires.     Patient and I did a total of 4 blood pressure readings. One standing and three seated. The blood pressure readings were done on her right arm with the study provided device and the readings are as follows:      128/74;66     127/74;66     132/78;67     A mean reading of 129/75      Heart rate: 66     standing pressure of 122/64;68     The patient's weight during this visit is 68.2 kg.      Patient verbalize wanting to continue in the study and we discussed her 32 month follow up visit.      Patient is compensated a $95.00 stipend per participation in the CREST 2 study which is issued per [employer] system.     Dr. Lee will be notified of blood pressure readings; if any changes are required patient will be notified.

## 2020-03-04 ENCOUNTER — RESEARCH ENCOUNTER (OUTPATIENT)
Dept: RESEARCH | Facility: HOSPITAL | Age: 65
End: 2020-03-04

## 2020-03-04 DIAGNOSIS — E87.5 HYPERKALEMIA: Primary | ICD-10-CM

## 2020-03-04 DIAGNOSIS — Z00.6 EXAMINATION OF PARTICIPANT IN CLINICAL TRIAL: ICD-10-CM

## 2020-03-04 NOTE — PROGRESS NOTES
Called patient to discuss information Dr. Lee left to have given to patient. Patient was receptive and voiced understanding. Scheduled labs for patients to be done in lab on [facility].

## 2020-03-06 ENCOUNTER — LAB VISIT (OUTPATIENT)
Dept: LAB | Facility: HOSPITAL | Age: 65
End: 2020-03-06
Attending: INTERNAL MEDICINE
Payer: COMMERCIAL

## 2020-03-06 DIAGNOSIS — E87.5 HYPERKALEMIA: ICD-10-CM

## 2020-03-06 LAB — POTASSIUM SERPL-SCNC: 5.1 MMOL/L (ref 3.5–5.1)

## 2020-03-06 PROCEDURE — 36415 COLL VENOUS BLD VENIPUNCTURE: CPT | Mod: PO

## 2020-03-06 PROCEDURE — 84132 ASSAY OF SERUM POTASSIUM: CPT

## 2020-03-16 ENCOUNTER — TELEPHONE (OUTPATIENT)
Dept: RESEARCH | Facility: HOSPITAL | Age: 65
End: 2020-03-16

## 2020-03-23 LAB
ALBUMIN SERPL BCP-MCNC: 3.8 G/DL (ref 3.5–5.2)
ALP SERPL-CCNC: 103 U/L (ref 55–135)
ALT SERPL W/O P-5'-P-CCNC: 21 U/L (ref 10–44)
ANION GAP SERPL CALC-SCNC: 5 MMOL/L (ref 8–16)
ANION GAP SERPL CALC-SCNC: 5 MMOL/L (ref 8–16)
AST SERPL-CCNC: 22 U/L (ref 10–40)
BILIRUB SERPL-MCNC: 0.4 MG/DL (ref 0.1–1)
BUN SERPL-MCNC: 22 MG/DL (ref 8–23)
BUN SERPL-MCNC: 22 MG/DL (ref 8–23)
CALCIUM SERPL-MCNC: 10.4 MG/DL (ref 8.7–10.5)
CALCIUM SERPL-MCNC: 10.4 MG/DL (ref 8.7–10.5)
CHLORIDE SERPL-SCNC: 107 MMOL/L (ref 95–110)
CHLORIDE SERPL-SCNC: 107 MMOL/L (ref 95–110)
CO2 SERPL-SCNC: 29 MMOL/L (ref 23–29)
CO2 SERPL-SCNC: 29 MMOL/L (ref 23–29)
CREAT SERPL-MCNC: 1.5 MG/DL (ref 0.5–1.4)
CREAT SERPL-MCNC: 1.5 MG/DL (ref 0.5–1.4)
EST. GFR  (AFRICAN AMERICAN): 42.4 ML/MIN/1.73 M^2
EST. GFR  (AFRICAN AMERICAN): 42.4 ML/MIN/1.73 M^2
EST. GFR  (NON AFRICAN AMERICAN): 36.8 ML/MIN/1.73 M^2
EST. GFR  (NON AFRICAN AMERICAN): 36.8 ML/MIN/1.73 M^2
GLUCOSE SERPL-MCNC: 114 MG/DL (ref 70–110)
GLUCOSE SERPL-MCNC: 114 MG/DL (ref 70–110)
POTASSIUM SERPL-SCNC: 5.7 MMOL/L (ref 3.5–5.1)
POTASSIUM SERPL-SCNC: 5.7 MMOL/L (ref 3.5–5.1)
PROT SERPL-MCNC: 7.5 G/DL (ref 6–8.4)
SODIUM SERPL-SCNC: 141 MMOL/L (ref 136–145)
SODIUM SERPL-SCNC: 141 MMOL/L (ref 136–145)

## 2020-04-15 DIAGNOSIS — I10 ESSENTIAL HYPERTENSION: ICD-10-CM

## 2020-04-15 DIAGNOSIS — E11.69 TYPE 2 DIABETES MELLITUS WITH OTHER SPECIFIED COMPLICATION, WITH LONG-TERM CURRENT USE OF INSULIN: ICD-10-CM

## 2020-04-15 DIAGNOSIS — Z79.4 TYPE 2 DIABETES MELLITUS WITH OTHER SPECIFIED COMPLICATION, WITH LONG-TERM CURRENT USE OF INSULIN: ICD-10-CM

## 2020-04-15 DIAGNOSIS — I67.9 CVD (CEREBROVASCULAR DISEASE): ICD-10-CM

## 2020-04-15 RX ORDER — CARVEDILOL 25 MG/1
25 TABLET ORAL 2 TIMES DAILY WITH MEALS
Qty: 60 TABLET | Refills: 6 | Status: SHIPPED | OUTPATIENT
Start: 2020-04-15 | End: 2021-06-10 | Stop reason: SDUPTHER

## 2020-04-28 ENCOUNTER — LAB VISIT (OUTPATIENT)
Dept: LAB | Facility: HOSPITAL | Age: 65
End: 2020-04-28
Attending: INTERNAL MEDICINE
Payer: COMMERCIAL

## 2020-04-28 DIAGNOSIS — R19.7 DIARRHEA: ICD-10-CM

## 2020-04-28 DIAGNOSIS — K21.00 GASTROESOPHAGEAL REFLUX DISEASE WITH ESOPHAGITIS: ICD-10-CM

## 2020-04-28 DIAGNOSIS — E11.9 DIABETES MELLITUS: ICD-10-CM

## 2020-04-28 DIAGNOSIS — R15.9 INCONTINENCE OF FECES: ICD-10-CM

## 2020-04-28 DIAGNOSIS — I10 HYPERTENSION: ICD-10-CM

## 2020-04-28 DIAGNOSIS — R10.33 ABDOMINAL PAIN, PERIUMBILICAL: Primary | ICD-10-CM

## 2020-04-28 LAB
25(OH)D3+25(OH)D2 SERPL-MCNC: 73 NG/ML (ref 30–96)
ALBUMIN SERPL BCP-MCNC: 4 G/DL (ref 3.5–5.2)
ALP SERPL-CCNC: 77 U/L (ref 55–135)
ALT SERPL W/O P-5'-P-CCNC: 13 U/L (ref 10–44)
AMYLASE SERPL-CCNC: 119 U/L (ref 20–110)
ANION GAP SERPL CALC-SCNC: 9 MMOL/L (ref 8–16)
AST SERPL-CCNC: 16 U/L (ref 10–40)
BASOPHILS # BLD AUTO: 0.02 K/UL (ref 0–0.2)
BASOPHILS NFR BLD: 0.4 % (ref 0–1.9)
BILIRUB SERPL-MCNC: 0.3 MG/DL (ref 0.1–1)
BUN SERPL-MCNC: 19 MG/DL (ref 8–23)
CALCIUM SERPL-MCNC: 10.4 MG/DL (ref 8.7–10.5)
CHLORIDE SERPL-SCNC: 109 MMOL/L (ref 95–110)
CO2 SERPL-SCNC: 24 MMOL/L (ref 23–29)
CREAT SERPL-MCNC: 1.1 MG/DL (ref 0.5–1.4)
CRP SERPL-MCNC: 0.2 MG/L (ref 0–8.2)
DIFFERENTIAL METHOD: ABNORMAL
EOSINOPHIL # BLD AUTO: 0.1 K/UL (ref 0–0.5)
EOSINOPHIL NFR BLD: 1.5 % (ref 0–8)
ERYTHROCYTE [DISTWIDTH] IN BLOOD BY AUTOMATED COUNT: 12.8 % (ref 11.5–14.5)
ERYTHROCYTE [SEDIMENTATION RATE] IN BLOOD BY WESTERGREN METHOD: 21 MM/HR (ref 0–36)
EST. GFR  (AFRICAN AMERICAN): >60 ML/MIN/1.73 M^2
EST. GFR  (NON AFRICAN AMERICAN): 53.6 ML/MIN/1.73 M^2
FOLATE SERPL-MCNC: 17.9 NG/ML (ref 4–24)
GLUCOSE SERPL-MCNC: 75 MG/DL (ref 70–110)
HCT VFR BLD AUTO: 31.9 % (ref 37–48.5)
HGB BLD-MCNC: 9.4 G/DL (ref 12–16)
IMM GRANULOCYTES # BLD AUTO: 0.01 K/UL (ref 0–0.04)
IMM GRANULOCYTES NFR BLD AUTO: 0.2 % (ref 0–0.5)
LYMPHOCYTES # BLD AUTO: 1.3 K/UL (ref 1–4.8)
LYMPHOCYTES NFR BLD: 24.8 % (ref 18–48)
MCH RBC QN AUTO: 27.5 PG (ref 27–31)
MCHC RBC AUTO-ENTMCNC: 29.5 G/DL (ref 32–36)
MCV RBC AUTO: 93 FL (ref 82–98)
MONOCYTES # BLD AUTO: 0.4 K/UL (ref 0.3–1)
MONOCYTES NFR BLD: 8 % (ref 4–15)
NEUTROPHILS # BLD AUTO: 3.5 K/UL (ref 1.8–7.7)
NEUTROPHILS NFR BLD: 65.1 % (ref 38–73)
NRBC BLD-RTO: 0 /100 WBC
PLATELET # BLD AUTO: 264 K/UL (ref 150–350)
PMV BLD AUTO: 10.3 FL (ref 9.2–12.9)
POTASSIUM SERPL-SCNC: 5.2 MMOL/L (ref 3.5–5.1)
PROT SERPL-MCNC: 7.3 G/DL (ref 6–8.4)
RBC # BLD AUTO: 3.42 M/UL (ref 4–5.4)
SODIUM SERPL-SCNC: 142 MMOL/L (ref 136–145)
T4 FREE SERPL-MCNC: 0.76 NG/DL (ref 0.71–1.51)
TSH SERPL DL<=0.005 MIU/L-ACNC: 0.7 UIU/ML (ref 0.4–4)
VIT B12 SERPL-MCNC: 581 PG/ML (ref 210–950)
WBC # BLD AUTO: 5.36 K/UL (ref 3.9–12.7)

## 2020-04-28 PROCEDURE — 84439 ASSAY OF FREE THYROXINE: CPT

## 2020-04-28 PROCEDURE — 85025 COMPLETE CBC W/AUTO DIFF WBC: CPT

## 2020-04-28 PROCEDURE — 83516 IMMUNOASSAY NONANTIBODY: CPT | Mod: 59

## 2020-04-28 PROCEDURE — 82306 VITAMIN D 25 HYDROXY: CPT

## 2020-04-28 PROCEDURE — 36415 COLL VENOUS BLD VENIPUNCTURE: CPT | Mod: PO

## 2020-04-28 PROCEDURE — 84443 ASSAY THYROID STIM HORMONE: CPT

## 2020-04-28 PROCEDURE — 86140 C-REACTIVE PROTEIN: CPT

## 2020-04-28 PROCEDURE — 82150 ASSAY OF AMYLASE: CPT

## 2020-04-28 PROCEDURE — 80053 COMPREHEN METABOLIC PANEL: CPT

## 2020-04-28 PROCEDURE — 82607 VITAMIN B-12: CPT

## 2020-04-28 PROCEDURE — 82746 ASSAY OF FOLIC ACID SERUM: CPT

## 2020-04-28 PROCEDURE — 85652 RBC SED RATE AUTOMATED: CPT

## 2020-05-01 LAB
GLIADIN PEPTIDE IGA SER-ACNC: 7 UNITS
GLIADIN PEPTIDE IGG SER-ACNC: 4 UNITS
IGA SERPL-MCNC: 239 MG/DL (ref 70–400)
TTG IGA SER-ACNC: 5 UNITS
TTG IGG SER-ACNC: 4 UNITS

## 2020-05-07 ENCOUNTER — HOSPITAL ENCOUNTER (OUTPATIENT)
Dept: RADIOLOGY | Facility: OTHER | Age: 65
Discharge: HOME | End: 2020-05-07
Attending: INTERNAL MEDICINE
Payer: COMMERCIAL

## 2020-05-07 DIAGNOSIS — E11.9 DM (DIABETES MELLITUS): ICD-10-CM

## 2020-05-07 DIAGNOSIS — R15.9 INCONTINENCE OF FECES: ICD-10-CM

## 2020-05-07 DIAGNOSIS — I10 HTN (HYPERTENSION): ICD-10-CM

## 2020-05-07 DIAGNOSIS — R19.7 DIARRHEA: ICD-10-CM

## 2020-05-07 DIAGNOSIS — K21.00 GASTROESOPHAGEAL REFLUX DISEASE WITH ESOPHAGITIS: ICD-10-CM

## 2020-05-07 DIAGNOSIS — R10.33 ABDOMINAL PAIN, PERIUMBILICAL: ICD-10-CM

## 2020-05-07 PROCEDURE — 25500020 PHARM REV CODE 255: Performed by: INTERNAL MEDICINE

## 2020-05-07 RX ADMIN — IOHEXOL 1000 ML: 9 SOLUTION ORAL at 09:05

## 2020-05-21 ENCOUNTER — HOSPITAL ENCOUNTER (OUTPATIENT)
Dept: RADIOLOGY | Facility: OTHER | Age: 65
Discharge: HOME | End: 2020-05-21
Attending: INTERNAL MEDICINE
Payer: COMMERCIAL

## 2020-05-21 PROCEDURE — 74177 CT ABDOMEN PELVIS WITH CONTRAST: ICD-10-PCS | Mod: 26,,, | Performed by: RADIOLOGY

## 2020-05-21 PROCEDURE — 74177 CT ABD & PELVIS W/CONTRAST: CPT | Mod: TC

## 2020-05-21 PROCEDURE — 25500020 PHARM REV CODE 255: Performed by: INTERNAL MEDICINE

## 2020-05-21 PROCEDURE — 74177 CT ABD & PELVIS W/CONTRAST: CPT | Mod: 26,,, | Performed by: RADIOLOGY

## 2020-05-21 RX ADMIN — IOHEXOL 1000 ML: 9 SOLUTION ORAL at 09:05

## 2020-05-21 RX ADMIN — IOHEXOL 75 ML: 350 INJECTION, SOLUTION INTRAVENOUS at 10:05

## 2020-05-26 ENCOUNTER — OFFICE VISIT (OUTPATIENT)
Dept: UROLOGY | Facility: CLINIC | Age: 65
End: 2020-05-26
Payer: COMMERCIAL

## 2020-05-26 VITALS — HEIGHT: 66 IN | BODY MASS INDEX: 24.75 KG/M2 | WEIGHT: 154 LBS

## 2020-05-26 DIAGNOSIS — R10.9 RIGHT FLANK PAIN: ICD-10-CM

## 2020-05-26 DIAGNOSIS — N13.30 HYDRONEPHROSIS, UNSPECIFIED HYDRONEPHROSIS TYPE: Primary | ICD-10-CM

## 2020-05-26 LAB
BILIRUB SERPL-MCNC: NORMAL MG/DL
BLOOD URINE, POC: NORMAL
COLOR, POC UA: YELLOW
GLUCOSE UR QL STRIP: NORMAL
KETONES UR QL STRIP: NORMAL
LEUKOCYTE ESTERASE URINE, POC: NORMAL
NITRITE, POC UA: NORMAL
PH, POC UA: 6
PROTEIN, POC: NORMAL
SPECIFIC GRAVITY, POC UA: 1005
UROBILINOGEN, POC UA: NORMAL

## 2020-05-26 PROCEDURE — 87086 URINE CULTURE/COLONY COUNT: CPT

## 2020-05-26 PROCEDURE — 99243 PR OFFICE CONSULTATION,LEVEL III: ICD-10-PCS | Mod: 25,S$GLB,ICN, | Performed by: UROLOGY

## 2020-05-26 PROCEDURE — 99999 PR PBB SHADOW E&M-EST. PATIENT-LVL II: ICD-10-PCS | Mod: PBBFAC,,, | Performed by: UROLOGY

## 2020-05-26 PROCEDURE — 99243 OFF/OP CNSLTJ NEW/EST LOW 30: CPT | Mod: 25,S$GLB,ICN, | Performed by: UROLOGY

## 2020-05-26 PROCEDURE — 99999 PR PBB SHADOW E&M-EST. PATIENT-LVL II: CPT | Mod: PBBFAC,,, | Performed by: UROLOGY

## 2020-05-26 PROCEDURE — 81001 POCT URINALYSIS, DIPSTICK OR TABLET REAGENT, AUTOMATED, WITH MICROSCOP: ICD-10-PCS | Mod: S$GLB,ICN,, | Performed by: UROLOGY

## 2020-05-26 PROCEDURE — 81001 URINALYSIS AUTO W/SCOPE: CPT | Mod: S$GLB,ICN,, | Performed by: UROLOGY

## 2020-05-26 RX ORDER — VITAMIN B COMPLEX
1 CAPSULE ORAL DAILY
COMMUNITY

## 2020-05-26 RX ORDER — ASCORBIC ACID 250 MG
1000 TABLET ORAL DAILY
COMMUNITY

## 2020-05-26 RX ORDER — CHOLECALCIFEROL (VITAMIN D3) 25 MCG
1000 TABLET ORAL DAILY
COMMUNITY

## 2020-05-26 RX ORDER — AMOXICILLIN 500 MG
CAPSULE ORAL DAILY
COMMUNITY

## 2020-05-26 NOTE — PROGRESS NOTES
"Subjective:       Patient ID: Erin Hahn is a 64 y.o. female who was referred by Jordy Larios    Chief Complaint:   Chief Complaint   Patient presents with    Other     new pt/ referred by Dr. Kaba for urinary tract obstruction       Right Hydronephrosis  She had a CT scan recently for bowel issues.  She has noted some right sided discomfort for the past few weeks.  It is not severe and is intermittent.  She denies hematuria or fever.  She is concerned that her bowels continue "gurgle".  She denies a history of stones or  surgery.      ACTIVE MEDICAL ISSUES:  Patient Active Problem List   Diagnosis    Essential hypertension    Essential hypertension    Type 1 diabetes mellitus with diabetic retinopathy    CVD (cerebrovascular disease)    Atypical chest pain    Hyperlipidemia    Right-sided carotid artery disease    Hemispheric carotid artery syndrome    Type 2 diabetes mellitus with other specified complication    Hyperkalemia       PAST MEDICAL HISTORY  Past Medical History:   Diagnosis Date    Carotid artery bruit     Diabetes mellitus     High cholesterol     Hypertension        PAST SURGICAL HISTORY:  History reviewed. No pertinent surgical history.    SOCIAL HISTORY:  Social History     Tobacco Use    Smoking status: Never Smoker    Smokeless tobacco: Never Used   Substance Use Topics    Alcohol use: No     Alcohol/week: 0.0 standard drinks    Drug use: No       FAMILY HISTORY:  Family History   Problem Relation Age of Onset    Diabetes Mother     Hepatitis Mother     No Known Problems Father     No Known Problems Sister     No Known Problems Brother     COPD Maternal Aunt     No Known Problems Maternal Uncle     No Known Problems Paternal Aunt     No Known Problems Paternal Uncle     Diabetes Maternal Grandmother     No Known Problems Maternal Grandfather     No Known Problems Paternal Grandmother     No Known Problems Paternal Grandfather     Anemia Neg Hx     " Arrhythmia Neg Hx     Asthma Neg Hx     Clotting disorder Neg Hx     Fainting Neg Hx     Heart attack Neg Hx     Heart disease Neg Hx     Heart failure Neg Hx     Hyperlipidemia Neg Hx     Hypertension Neg Hx     Stroke Neg Hx     Atrial Septal Defect Neg Hx        ALLERGIES AND MEDICATIONS: updated and reviewed.  Review of patient's allergies indicates:   Allergen Reactions    Atorvastatin      myalgias     Current Outpatient Medications   Medication Sig    ascorbic acid, vitamin C, (VITAMIN C) 250 MG tablet Take 250 mg by mouth once daily.    aspirin (ECOTRIN) 325 MG EC tablet Take 1 tablet (325 mg total) by mouth once daily.    b complex vitamins capsule Take 1 capsule by mouth once daily.    carvediloL (COREG) 25 MG tablet Take 1 tablet (25 mg total) by mouth 2 (two) times daily with meals.    chlorthalidone (HYGROTEN) 50 MG Tab Take 1 tablet (50 mg total) by mouth once daily.    INSULIN DETEMIR (LEVEMIR SUBQ) Inject 17 Units into the skin.    loperamide (IMODIUM) 2 mg capsule 2 mg after each loose stool (maximum: 12 mg/day)    omega-3 fatty acids/fish oil (FISH OIL-OMEGA-3 FATTY ACIDS) 300-1,000 mg capsule Take by mouth once daily.    pantoprazole (PROTONIX) 40 MG tablet Take 40 mg by mouth once daily.     sitagliptan-metformin (JANUMET) 50-1,000 mg per tablet Take 1 tablet by mouth 2 (two) times daily with meals.     vitamin D (VITAMIN D3) 1000 units Tab Take 1,000 Units by mouth once daily.    felodipine (PLENDIL) 10 MG 24 hr tablet Take 1 tablet (10 mg total) by mouth once daily.    rosuvastatin (CRESTOR) 20 MG tablet Take 1 tablet (20 mg total) by mouth once daily.     No current facility-administered medications for this visit.        Review of Systems   Constitutional: Negative for activity change, fatigue, fever and unexpected weight change.   Eyes: Negative for redness and visual disturbance.   Respiratory: Negative for chest tightness and shortness of breath.    Cardiovascular:  "Negative for chest pain and leg swelling.   Gastrointestinal: Negative for abdominal distention, abdominal pain, constipation, diarrhea, nausea and vomiting.   Genitourinary: Negative for difficulty urinating, dysuria, flank pain, frequency, hematuria, pelvic pain, urgency and vaginal bleeding.   Musculoskeletal: Negative for arthralgias and joint swelling.   Neurological: Negative for dizziness, weakness and headaches.   Psychiatric/Behavioral: Negative for confusion. The patient is not nervous/anxious.    All other systems reviewed and are negative.      Objective:      Vitals:    05/26/20 0906   Weight: 69.8 kg (153 lb 15.9 oz)   Height: 5' 6" (1.676 m)     Physical Exam   Nursing note and vitals reviewed.  Constitutional: She is oriented to person, place, and time. She appears well-developed.   HENT:   Head: Normocephalic.   Eyes: Conjunctivae are normal.   Neck: Normal range of motion. No tracheal deviation present. No thyromegaly present.   Cardiovascular: Normal rate, normal heart sounds and normal pulses.    Pulmonary/Chest: Effort normal and breath sounds normal. No respiratory distress. She has no wheezes.   Abdominal: Soft. She exhibits no distension and no mass. There is no hepatosplenomegaly. There is no tenderness. There is no rebound, no guarding and no CVA tenderness. No hernia.   Musculoskeletal: Normal range of motion. She exhibits no edema or tenderness.   Lymphadenopathy:     She has no cervical adenopathy.   Neurological: She is alert and oriented to person, place, and time.   Skin: Skin is warm and dry. No rash noted. No erythema.     Psychiatric: She has a normal mood and affect. Her behavior is normal. Judgment and thought content normal.       Urine dipstick shows negative for all components.  Micro exam: negative for WBC's or RBC's.    CMP  Sodium   Date Value Ref Range Status   04/28/2020 142 136 - 145 mmol/L Final     Potassium   Date Value Ref Range Status   04/28/2020 5.2 (H) 3.5 - 5.1 " mmol/L Final     Chloride   Date Value Ref Range Status   04/28/2020 109 95 - 110 mmol/L Final     CO2   Date Value Ref Range Status   04/28/2020 24 23 - 29 mmol/L Final     Glucose   Date Value Ref Range Status   04/28/2020 75 70 - 110 mg/dL Final     BUN, Bld   Date Value Ref Range Status   04/28/2020 19 8 - 23 mg/dL Final     Creatinine   Date Value Ref Range Status   04/28/2020 1.1 0.5 - 1.4 mg/dL Final     Calcium   Date Value Ref Range Status   04/28/2020 10.4 8.7 - 10.5 mg/dL Final     Total Protein   Date Value Ref Range Status   04/28/2020 7.3 6.0 - 8.4 g/dL Final     Albumin   Date Value Ref Range Status   04/28/2020 4.0 3.5 - 5.2 g/dL Final     Total Bilirubin   Date Value Ref Range Status   04/28/2020 0.3 0.1 - 1.0 mg/dL Final     Comment:     For infants and newborns, interpretation of results should be based  on gestational age, weight and in agreement with clinical  observations.  Premature Infant recommended reference ranges:  Up to 24 hours.............<8.0 mg/dL  Up to 48 hours............<12.0 mg/dL  3-5 days..................<15.0 mg/dL  6-29 days.................<15.0 mg/dL       Alkaline Phosphatase   Date Value Ref Range Status   04/28/2020 77 55 - 135 U/L Final     AST   Date Value Ref Range Status   04/28/2020 16 10 - 40 U/L Final     ALT   Date Value Ref Range Status   04/28/2020 13 10 - 44 U/L Final     Anion Gap   Date Value Ref Range Status   04/28/2020 9 8 - 16 mmol/L Final     eGFR if    Date Value Ref Range Status   04/28/2020 >60.0 >60 mL/min/1.73 m^2 Final     eGFR if non    Date Value Ref Range Status   04/28/2020 53.6 (A) >60 mL/min/1.73 m^2 Final     Comment:     Calculation used to obtain the estimated glomerular filtration  rate (eGFR) is the CKD-EPI equation.            CT Abdomen Pelvis With Contrast   Order: [id number]   Status:  Final result   Visible to patient:  No (Not Released)   Next appt:  None   Dx:  Diarrhea; HTN (hypertension);  Abdomin...   Details     Reading Physician Reading Date Result Priority   Eliecer Soto MD 5/21/2020 Routine      Narrative     EXAMINATION:  CT ABDOMEN PELVIS WITH CONTRAST    CLINICAL HISTORY:  Periumbilical pain    TECHNIQUE:  Low dose axial images, sagittal and coronal reformations were obtained from the lung bases to the pubic symphysis following the IV administration of 75 mL of Omnipaque 350 and the oral administration of 30 mL of Omnipaque 350.    COMPARISON:  None.    FINDINGS:  Evaluation of the structures at the base of the chest show dependent atelectasis in the right lower lobe.  Limited evaluation of the heart is unremarkable.    The liver is normal in size without solid mass.  No biliary ductal dilatation.  Gallbladder is unremarkable.    The spleen, adrenal glands, and pancreas show no focal abnormality.    Bilateral kidneys are normal in size.  There is mild right hydronephrosis, but no evidence of nephrolithiasis.  There is subtle narrowing of the proximal right ureter.  No definite mass identified.    The gastrointestinal tract shows no evidence of wall thickening or obstruction.  The appendix is normal.  Small lipoma at the terminal ileum.  No free fluid or free gas.  No concerning lymphadenopathy.    Urinary bladder shows no focal wall thickening.  Small calcifications in the uterus and bilateral adnexa.    Vascular structures show normal course and caliber.  Degenerative changes of the lumbar spine.  No acute fractures or destructive osseous lesions.  Fat containing umbilical hernia.      Impression       There is mild right hydronephrosis with suggestion of some focal narrowing of the proximal right ureter, just beyond the ureteropelvic junction.  Urologic consultation recommended.    Small calcifications in the uterus and bilateral adnexa.  Pelvic ultrasound may be obtained for further evaluation.    No evidence of gastrointestinal wall thickening or obstruction.    This report was  flagged in Epic as abnormal.      Electronically signed by: Eliecer Soto MD  Date: 05/21/2020  Time: 10:44            Last Resulted: 05/21/20 10:44               Assessment:       1. Hydronephrosis, unspecified hydronephrosis type    2. Right flank pain          Plan:     1. Hydronephrosis, unspecified hydronephrosis type  I offered a cystoscopy with retrograde pyelogram, possible stent.    She wants to think about it.    Her possible  issues should not be causing her bowel complaints.    - POCT urinalysis, dipstick or tablet reag    2. Right flank pain  Not clear if the hydronephrosis is the source.  - Urine culture            No follow-ups on file.

## 2020-05-26 NOTE — LETTER
May 26, 2020      Jordy Larios  [address]  [facility] [address]           [facility]  [address]  [place] [address]  Phone: [phone]  Fax: [phone]          Patient: Erin Hahn   MR Number: [id number]   YOB: 1955   Date of Visit: 5/26/2020       Dear Jordy Larios:    Thank you for referring Erin Hahn to me for evaluation. Attached you will find relevant portions of my assessment and plan of care.    If you have questions, please do not hesitate to call me. I look forward to following Erin Hahn along with you.    Sincerely,    CARLOS Liz MD    Enclosure  CC:  No Recipients    If you would like to receive this communication electronically, please contact [email or web address] or [phone] to request more information on [employer] access.    For providers and/or their staff who would like to refer a patient to [facility], please contact us through our one-stop-shop provider referral line, [facility] [place], at [phone].    If you feel you have received this communication in error or would no longer like to receive these types of communications, please e-mail [email or web address]

## 2020-05-28 ENCOUNTER — TELEPHONE (OUTPATIENT)
Dept: UROLOGY | Facility: CLINIC | Age: 65
End: 2020-05-28

## 2020-05-28 LAB — BACTERIA UR CULT: NORMAL

## 2020-06-05 ENCOUNTER — LAB VISIT (OUTPATIENT)
Dept: LAB | Facility: HOSPITAL | Age: 65
End: 2020-06-05
Attending: INTERNAL MEDICINE
Payer: COMMERCIAL

## 2020-06-05 DIAGNOSIS — R10.9 ABDOMINAL PAIN: Primary | ICD-10-CM

## 2020-06-05 DIAGNOSIS — R10.33 PERIUMBILICAL ABDOMINAL CRAMPING: ICD-10-CM

## 2020-06-05 DIAGNOSIS — D64.9 ANEMIA, UNSPECIFIED: ICD-10-CM

## 2020-06-05 LAB
FERRITIN SERPL-MCNC: 72 NG/ML (ref 20–300)
IRON SERPL-MCNC: 69 UG/DL (ref 30–160)
LIPASE SERPL-CCNC: 22 U/L (ref 4–60)
RETICS/RBC NFR AUTO: 1.5 % (ref 0.5–2.5)
SATURATED IRON: 18 % (ref 20–50)
TOTAL IRON BINDING CAPACITY: 373 UG/DL (ref 250–450)
TRANSFERRIN SERPL-MCNC: 252 MG/DL (ref 200–375)

## 2020-06-05 PROCEDURE — 83690 ASSAY OF LIPASE: CPT

## 2020-06-05 PROCEDURE — 82728 ASSAY OF FERRITIN: CPT

## 2020-06-05 PROCEDURE — 83516 IMMUNOASSAY NONANTIBODY: CPT | Mod: 59

## 2020-06-05 PROCEDURE — 83540 ASSAY OF IRON: CPT

## 2020-06-05 PROCEDURE — 85045 AUTOMATED RETICULOCYTE COUNT: CPT

## 2020-06-05 PROCEDURE — 36415 COLL VENOUS BLD VENIPUNCTURE: CPT | Mod: PO

## 2020-06-11 ENCOUNTER — TELEPHONE (OUTPATIENT)
Dept: CARDIOLOGY | Facility: CLINIC | Age: 65
End: 2020-06-11

## 2020-06-11 DIAGNOSIS — I67.9 CVD (CEREBROVASCULAR DISEASE): ICD-10-CM

## 2020-06-11 DIAGNOSIS — Z79.4 TYPE 2 DIABETES MELLITUS WITH OTHER SPECIFIED COMPLICATION, WITH LONG-TERM CURRENT USE OF INSULIN: ICD-10-CM

## 2020-06-11 DIAGNOSIS — I10 ESSENTIAL HYPERTENSION: ICD-10-CM

## 2020-06-11 DIAGNOSIS — E11.69 TYPE 2 DIABETES MELLITUS WITH OTHER SPECIFIED COMPLICATION, WITH LONG-TERM CURRENT USE OF INSULIN: ICD-10-CM

## 2020-06-11 RX ORDER — ROSUVASTATIN CALCIUM 20 MG/1
20 TABLET, COATED ORAL DAILY
Qty: 30 TABLET | Refills: 11 | Status: SHIPPED | OUTPATIENT
Start: 2020-06-11 | End: 2020-06-11 | Stop reason: SDUPTHER

## 2020-06-11 RX ORDER — ROSUVASTATIN CALCIUM 20 MG/1
20 TABLET, COATED ORAL DAILY
Qty: 30 TABLET | Refills: 11 | Status: ON HOLD | OUTPATIENT
Start: 2020-06-11 | End: 2023-02-28 | Stop reason: HOSPADM

## 2020-06-11 NOTE — TELEPHONE ENCOUNTER
----- Message from Yuan Carter sent at 6/11/2020 11:45 AM CDT -----  Regarding: Medication Refill  Good afternoon Ladies,    I received a called from the patient requesting a refill for her Crestor. I told her I would reach out to you with the request. Thank you in advance for your time.    Kindly,      Yuan

## 2020-09-02 ENCOUNTER — TELEPHONE (OUTPATIENT)
Dept: RESEARCH | Facility: HOSPITAL | Age: 65
End: 2020-09-02

## 2020-09-02 NOTE — TELEPHONE ENCOUNTER
Called patient to schedule 30 month follow up visit for CREST 2 study.  
Color consistent with ethnicity/race, warm, dry intact, resilient.

## 2020-09-09 ENCOUNTER — RESEARCH ENCOUNTER (OUTPATIENT)
Dept: RESEARCH | Facility: HOSPITAL | Age: 65
End: 2020-09-09

## 2020-09-10 ENCOUNTER — TELEPHONE (OUTPATIENT)
Dept: CARDIOLOGY | Facility: CLINIC | Age: 65
End: 2020-09-10

## 2020-09-10 NOTE — TELEPHONE ENCOUNTER
----- Message from Yuan Carter sent at 9/9/2020  9:50 PM CDT -----  Regarding: [place] [facility]  DONNA Singh     [facility] 2 Study     Medical Management (only) arm      30 month follow up visit           I met with [facility] 2 patient who was randomized to medical management only arm of the study today to do a 30 month follow up visit. Patient presented in good spirits. We discussed her medications of which she is compliant and she stated no medication changes. Patient did state she has a blood pressure machine at home. She stated had no emergency room or hospital stays since last follow up. During the visit a [place] and NIH stroke scales were conducted where both resulted in 0.   We went through all [facility] 2 QVSS questionnaires.     Patient and I did a total of 4 blood pressure readings. One standing and three seated. The blood pressure readings were done on her right arm with the study provided device and the readings are as follows:      106/64;68     94/63;69     106/61;68     A mean reading of 102/62      Heart rate: 68     standing pressure of 114/69     The patient's weight during this visit is 69.2 kg.

## 2020-09-10 NOTE — PROGRESS NOTES
DONNA Singh     CREST 2 Study     Medical Management (only) arm      30 month follow up visit           I met with CREST 2 patient who was randomized to medical management only arm of the study today to do a 30 month follow up visit. Patient presented in good spirits. We discussed her medications of which she is compliant and she stated no medication changes. Patient did state she has a blood pressure machine at home. She stated had no emergency room or hospital stays since last follow up. During the visit a [place] and NIH stroke scales were conducted where both resulted in 0.   We went through all CREST 2 QVSS questionnaires.     Patient and I did a total of 4 blood pressure readings. One standing and three seated. The blood pressure readings were done on her right arm with the study provided device and the readings are as follows:      109/64;68     93/64;69     104/63;68     A mean reading of 102/63      Heart rate: 68     standing pressure of 114/63     The patient's weight during this visit is 69.2 kg.      Patient verbalize wanting to continue in the study and we discussed her 36 month follow up visit.      Patient is compensated a $95.00 stipend per participation in the CREST 2 study which is issued per [employer] system.     Dr. Lee will be notified of blood pressure readings; if any changes are required patient will be notified.

## 2021-01-06 DIAGNOSIS — I10 ESSENTIAL HYPERTENSION: ICD-10-CM

## 2021-01-06 DIAGNOSIS — E87.5 HYPERKALEMIA: ICD-10-CM

## 2021-01-06 RX ORDER — CHLORTHALIDONE 50 MG/1
50 TABLET ORAL DAILY
Qty: 30 TABLET | Refills: 11 | Status: SHIPPED | OUTPATIENT
Start: 2021-01-06 | End: 2021-09-28 | Stop reason: SDUPTHER

## 2021-01-11 ENCOUNTER — TELEPHONE (OUTPATIENT)
Dept: RESEARCH | Facility: HOSPITAL | Age: 66
End: 2021-01-11

## 2021-01-27 DIAGNOSIS — I65.21 STENOSIS OF RIGHT CAROTID ARTERY: Primary | ICD-10-CM

## 2021-01-27 DIAGNOSIS — Z00.6 EXAMINATION OF PARTICIPANT IN CLINICAL TRIAL: ICD-10-CM

## 2021-02-03 ENCOUNTER — TELEPHONE (OUTPATIENT)
Dept: CARDIOLOGY | Facility: CLINIC | Age: 66
End: 2021-02-03

## 2021-02-03 ENCOUNTER — HOSPITAL ENCOUNTER (OUTPATIENT)
Dept: CARDIOLOGY | Facility: HOSPITAL | Age: 66
Discharge: HOME | End: 2021-02-03
Attending: INTERNAL MEDICINE
Payer: COMMERCIAL

## 2021-02-03 ENCOUNTER — RESEARCH ENCOUNTER (OUTPATIENT)
Dept: RESEARCH | Facility: HOSPITAL | Age: 66
End: 2021-02-03

## 2021-02-03 DIAGNOSIS — I65.21 STENOSIS OF RIGHT CAROTID ARTERY: ICD-10-CM

## 2021-02-03 DIAGNOSIS — Z00.6 EXAMINATION OF PARTICIPANT IN CLINICAL TRIAL: ICD-10-CM

## 2021-02-03 LAB
LEFT ARM DIASTOLIC BLOOD PRESSURE: 70 MMHG
LEFT ARM SYSTOLIC BLOOD PRESSURE: 132 MMHG
LEFT CBA DIAS: 18 CM/S
LEFT CBA SYS: 52 CM/S
LEFT CCA DIST DIAS: 18 CM/S
LEFT CCA DIST SYS: 71 CM/S
LEFT CCA MID DIAS: 28 CM/S
LEFT CCA MID SYS: 78 CM/S
LEFT CCA PROX DIAS: 21 CM/S
LEFT CCA PROX SYS: 103 CM/S
LEFT ECA DIAS: 9 CM/S
LEFT ECA SYS: 66 CM/S
LEFT ICA DIST DIAS: 29 CM/S
LEFT ICA DIST SYS: 90 CM/S
LEFT ICA MID DIAS: 24 CM/S
LEFT ICA MID SYS: 70 CM/S
LEFT ICA PROX DIAS: 17 CM/S
LEFT ICA PROX SYS: 54 CM/S
LEFT VERTEBRAL DIAS: 23 CM/S
LEFT VERTEBRAL SYS: 61 CM/S
OHS CV CAROTID RIGHT ICA EDV HIGHEST: 110
OHS CV CAROTID ULTRASOUND LEFT ICA/CCA RATIO: 1.27
OHS CV CAROTID ULTRASOUND RIGHT ICA/CCA RATIO: 6.18
OHS CV PV CAROTID LEFT HIGHEST CCA: 103
OHS CV PV CAROTID LEFT HIGHEST ICA: 90
OHS CV PV CAROTID RIGHT HIGHEST CCA: 67
OHS CV PV CAROTID RIGHT HIGHEST ICA: 340
OHS CV US CAROTID LEFT HIGHEST EDV: 29
RIGHT ARM DIASTOLIC BLOOD PRESSURE: 78 MMHG
RIGHT ARM SYSTOLIC BLOOD PRESSURE: 136 MMHG
RIGHT CBA DIAS: 20 CM/S
RIGHT CBA SYS: 72 CM/S
RIGHT CCA DIST DIAS: 15 CM/S
RIGHT CCA DIST SYS: 55 CM/S
RIGHT CCA MID DIAS: 17 CM/S
RIGHT CCA MID SYS: 60 CM/S
RIGHT CCA PROX DIAS: 17 CM/S
RIGHT CCA PROX SYS: 67 CM/S
RIGHT ECA DIAS: 15 CM/S
RIGHT ECA SYS: 177 CM/S
RIGHT ICA DIST DIAS: 28 CM/S
RIGHT ICA DIST SYS: 69 CM/S
RIGHT ICA MID DIAS: 24 CM/S
RIGHT ICA MID SYS: 66 CM/S
RIGHT ICA PROX DIAS: 110 CM/S
RIGHT ICA PROX SYS: 340 CM/S
RIGHT VERTEBRAL DIAS: 18 CM/S
RIGHT VERTEBRAL SYS: 56 CM/S

## 2021-02-03 PROCEDURE — 93880 EXTRACRANIAL BILAT STUDY: CPT | Mod: 26,,, | Performed by: INTERNAL MEDICINE

## 2021-02-03 PROCEDURE — 93880 EXTRACRANIAL BILAT STUDY: CPT

## 2021-02-03 PROCEDURE — 93880 CV US DOPPLER CAROTID (CUPID ONLY): ICD-10-PCS | Mod: 26,,, | Performed by: INTERNAL MEDICINE

## 2021-02-04 ENCOUNTER — RESEARCH ENCOUNTER (OUTPATIENT)
Dept: RESEARCH | Facility: HOSPITAL | Age: 66
End: 2021-02-04

## 2021-03-02 ENCOUNTER — TELEPHONE (OUTPATIENT)
Dept: RESEARCH | Facility: HOSPITAL | Age: 66
End: 2021-03-02

## 2021-03-05 ENCOUNTER — TELEPHONE (OUTPATIENT)
Dept: CARDIOLOGY | Facility: CLINIC | Age: 66
End: 2021-03-05

## 2021-03-05 ENCOUNTER — RESEARCH ENCOUNTER (OUTPATIENT)
Dept: RESEARCH | Facility: HOSPITAL | Age: 66
End: 2021-03-05

## 2021-06-10 DIAGNOSIS — E11.69 TYPE 2 DIABETES MELLITUS WITH OTHER SPECIFIED COMPLICATION, WITH LONG-TERM CURRENT USE OF INSULIN: ICD-10-CM

## 2021-06-10 DIAGNOSIS — I67.9 CVD (CEREBROVASCULAR DISEASE): ICD-10-CM

## 2021-06-10 DIAGNOSIS — Z79.4 TYPE 2 DIABETES MELLITUS WITH OTHER SPECIFIED COMPLICATION, WITH LONG-TERM CURRENT USE OF INSULIN: ICD-10-CM

## 2021-06-10 DIAGNOSIS — I10 ESSENTIAL HYPERTENSION: ICD-10-CM

## 2021-06-11 RX ORDER — CARVEDILOL 25 MG/1
25 TABLET ORAL 2 TIMES DAILY WITH MEALS
Qty: 60 TABLET | Refills: 3 | Status: SHIPPED | OUTPATIENT
Start: 2021-06-11 | End: 2021-11-03

## 2021-06-11 RX ORDER — CARVEDILOL 25 MG/1
25 TABLET ORAL 2 TIMES DAILY WITH MEALS
Qty: 60 TABLET | Refills: 6 | Status: SHIPPED | OUTPATIENT
Start: 2021-06-11 | End: 2021-07-02 | Stop reason: CLARIF

## 2021-06-15 ENCOUNTER — TELEPHONE (OUTPATIENT)
Dept: RESEARCH | Facility: HOSPITAL | Age: 66
End: 2021-06-15

## 2021-07-02 ENCOUNTER — HOSPITAL ENCOUNTER (OUTPATIENT)
Facility: OTHER | Age: 66
Discharge: HOME | End: 2021-07-03
Attending: EMERGENCY MEDICINE | Admitting: INTERNAL MEDICINE
Payer: COMMERCIAL

## 2021-07-02 DIAGNOSIS — I63.9 STROKE: ICD-10-CM

## 2021-07-02 DIAGNOSIS — Z79.4 TYPE 2 DIABETES MELLITUS WITH OTHER SPECIFIED COMPLICATION, WITH LONG-TERM CURRENT USE OF INSULIN: ICD-10-CM

## 2021-07-02 DIAGNOSIS — I10 ESSENTIAL HYPERTENSION: ICD-10-CM

## 2021-07-02 DIAGNOSIS — E78.5 HYPERLIPIDEMIA, UNSPECIFIED HYPERLIPIDEMIA TYPE: ICD-10-CM

## 2021-07-02 DIAGNOSIS — E11.69 TYPE 2 DIABETES MELLITUS WITH OTHER SPECIFIED COMPLICATION, WITH LONG-TERM CURRENT USE OF INSULIN: ICD-10-CM

## 2021-07-02 DIAGNOSIS — I65.21 CAROTID STENOSIS, RIGHT: ICD-10-CM

## 2021-07-02 DIAGNOSIS — R53.1 WEAKNESS: ICD-10-CM

## 2021-07-02 DIAGNOSIS — E78.2 MIXED HYPERLIPIDEMIA: ICD-10-CM

## 2021-07-02 DIAGNOSIS — I67.9 CVD (CEREBROVASCULAR DISEASE): ICD-10-CM

## 2021-07-02 DIAGNOSIS — G45.9 TIA (TRANSIENT ISCHEMIC ATTACK): Primary | ICD-10-CM

## 2021-07-02 PROBLEM — I63.231 ACUTE ISCHEMIC RIGHT ICA STROKE: Status: ACTIVE | Noted: 2021-07-02

## 2021-07-02 LAB
ALBUMIN SERPL BCP-MCNC: 3.9 G/DL (ref 3.5–5.2)
ALP SERPL-CCNC: 77 U/L (ref 55–135)
ALT SERPL W/O P-5'-P-CCNC: 15 U/L (ref 10–44)
AMPHET+METHAMPHET UR QL: NEGATIVE
ANION GAP SERPL CALC-SCNC: 11 MMOL/L (ref 8–16)
ASCENDING AORTA: 2.4 CM
AST SERPL-CCNC: 22 U/L (ref 10–40)
AV INDEX (PROSTH): 0.59
AV MEAN GRADIENT: 4 MMHG
AV PEAK GRADIENT: 6 MMHG
AV VALVE AREA: 2.26 CM2
AV VELOCITY RATIO: 0.67
BACTERIA #/AREA URNS HPF: NORMAL /HPF
BARBITURATES UR QL SCN>200 NG/ML: NEGATIVE
BASOPHILS # BLD AUTO: 0.03 K/UL (ref 0–0.2)
BASOPHILS NFR BLD: 0.5 % (ref 0–1.9)
BENZODIAZ UR QL SCN>200 NG/ML: NEGATIVE
BILIRUB SERPL-MCNC: 0.5 MG/DL (ref 0.1–1)
BILIRUB UR QL STRIP: NEGATIVE
BILIRUB UR QL STRIP: NEGATIVE
BSA FOR ECHO PROCEDURE: 1.77 M2
BUN SERPL-MCNC: 21 MG/DL (ref 8–23)
BZE UR QL SCN: NEGATIVE
CALCIUM SERPL-MCNC: 9.6 MG/DL (ref 8.7–10.5)
CANNABINOIDS UR QL SCN: NEGATIVE
CHLORIDE SERPL-SCNC: 108 MMOL/L (ref 95–110)
CLARITY UR: CLEAR
CLARITY UR: CLEAR
CO2 SERPL-SCNC: 20 MMOL/L (ref 23–29)
COLOR UR: YELLOW
COLOR UR: YELLOW
CREAT SERPL-MCNC: 1.2 MG/DL (ref 0.5–1.4)
CREAT SERPL-MCNC: 1.2 MG/DL (ref 0.5–1.4)
CREAT UR-MCNC: 31.6 MG/DL (ref 15–325)
CV ECHO LV RWT: 0.36 CM
DIFFERENTIAL METHOD: ABNORMAL
DOP CALC AO PEAK VEL: 1.2 M/S
DOP CALC AO VTI: 27.4 CM
DOP CALC LVOT AREA: 3.8 CM2
DOP CALC LVOT DIAMETER: 2.2 CM
DOP CALC LVOT PEAK VEL: 0.8 M/S
DOP CALC LVOT STROKE VOLUME: 61.93 CM3
DOP CALCLVOT PEAK VEL VTI: 16.3 CM
ECHO LV POSTERIOR WALL: 0.9 CM (ref 0.6–1.1)
EJECTION FRACTION: 65 %
EOSINOPHIL # BLD AUTO: 0.1 K/UL (ref 0–0.5)
EOSINOPHIL NFR BLD: 1.2 % (ref 0–8)
ERYTHROCYTE [DISTWIDTH] IN BLOOD BY AUTOMATED COUNT: 13.1 % (ref 11.5–14.5)
EST. GFR  (AFRICAN AMERICAN): 55 ML/MIN/1.73 M^2
EST. GFR  (NON AFRICAN AMERICAN): 48 ML/MIN/1.73 M^2
FRACTIONAL SHORTENING: 30 % (ref 28–44)
GLUCOSE SERPL-MCNC: 183 MG/DL (ref 70–110)
GLUCOSE UR QL STRIP: NEGATIVE
GLUCOSE UR QL STRIP: NEGATIVE
HCT VFR BLD AUTO: 32.4 % (ref 37–48.5)
HCV AB SERPL QL IA: NEGATIVE
HGB BLD-MCNC: 10 G/DL (ref 12–16)
HGB UR QL STRIP: NEGATIVE
HGB UR QL STRIP: NEGATIVE
HIV 1+2 AB+HIV1 P24 AG SERPL QL IA: NEGATIVE
HYALINE CASTS #/AREA URNS LPF: 0 /LPF
IMM GRANULOCYTES # BLD AUTO: 0.02 K/UL (ref 0–0.04)
IMM GRANULOCYTES NFR BLD AUTO: 0.3 % (ref 0–0.5)
INR PPP: 1 (ref 0.8–1.2)
INTERVENTRICULAR SEPTUM: 0.9 CM (ref 0.6–1.1)
KETONES UR QL STRIP: NEGATIVE
KETONES UR QL STRIP: NEGATIVE
LA MAJOR: 4.4 CM
LA MINOR: 4.6 CM
LA WIDTH: 3.7 CM
LACTATE SERPL-SCNC: 1.4 MMOL/L (ref 0.5–2.2)
LEFT ATRIUM SIZE: 3.3 CM
LEFT ATRIUM VOLUME INDEX MOD: 23.9 ML/M2
LEFT ATRIUM VOLUME INDEX: 26.5 ML/M2
LEFT ATRIUM VOLUME MOD: 42 CM3
LEFT ATRIUM VOLUME: 46.68 CM3
LEFT INTERNAL DIMENSION IN SYSTOLE: 3.5 CM (ref 2.1–4)
LEFT VENTRICLE MASS INDEX: 90 G/M2
LEFT VENTRICULAR INTERNAL DIMENSION IN DIASTOLE: 5 CM (ref 3.5–6)
LEFT VENTRICULAR MASS: 158.21 G
LEUKOCYTE ESTERASE UR QL STRIP: ABNORMAL
LEUKOCYTE ESTERASE UR QL STRIP: NEGATIVE
LVOT MG: 1.33 MMHG
LVOT MV: 0.55 CM/S
LYMPHOCYTES # BLD AUTO: 1.4 K/UL (ref 1–4.8)
LYMPHOCYTES NFR BLD: 24 % (ref 18–48)
MCH RBC QN AUTO: 27.4 PG (ref 27–31)
MCHC RBC AUTO-ENTMCNC: 30.9 G/DL (ref 32–36)
MCV RBC AUTO: 89 FL (ref 82–98)
METHADONE UR QL SCN>300 NG/ML: NEGATIVE
MICROSCOPIC COMMENT: NORMAL
MONOCYTES # BLD AUTO: 0.4 K/UL (ref 0.3–1)
MONOCYTES NFR BLD: 6.7 % (ref 4–15)
NEUTROPHILS # BLD AUTO: 4 K/UL (ref 1.8–7.7)
NEUTROPHILS NFR BLD: 67.3 % (ref 38–73)
NITRITE UR QL STRIP: NEGATIVE
NITRITE UR QL STRIP: NEGATIVE
NRBC BLD-RTO: 0 /100 WBC
OPIATES UR QL SCN: NEGATIVE
PCP UR QL SCN>25 NG/ML: NEGATIVE
PH UR STRIP: 7 [PH] (ref 5–8)
PH UR STRIP: 7 [PH] (ref 5–8)
PISA TR MAX VEL: 2.27 M/S
PLATELET # BLD AUTO: 259 K/UL (ref 150–450)
PMV BLD AUTO: 10.1 FL (ref 9.2–12.9)
POCT GLUCOSE: 135 MG/DL (ref 70–110)
POCT GLUCOSE: 203 MG/DL (ref 70–110)
POTASSIUM SERPL-SCNC: 4.6 MMOL/L (ref 3.5–5.1)
PROT SERPL-MCNC: 7.6 G/DL (ref 6–8.4)
PROT UR QL STRIP: NEGATIVE
PROT UR QL STRIP: NEGATIVE
PROTHROMBIN TIME: 11.3 SEC (ref 9–12.5)
RA PRESSURE: 3 MMHG
RBC # BLD AUTO: 3.65 M/UL (ref 4–5.4)
RBC #/AREA URNS HPF: 0 /HPF (ref 0–4)
RIGHT VENTRICULAR END-DIASTOLIC DIMENSION: 2.8 CM
RV TISSUE DOPPLER FREE WALL SYSTOLIC VELOCITY 1 (APICAL 4 CHAMBER VIEW): 0 CM/S
SAMPLE: NORMAL
SINUS: 2.8 CM
SODIUM SERPL-SCNC: 139 MMOL/L (ref 136–145)
SP GR UR STRIP: 1.01 (ref 1–1.03)
SP GR UR STRIP: <=1.005 (ref 1–1.03)
SQUAMOUS #/AREA URNS HPF: 1 /HPF
STJ: 2.2 CM
T4 FREE SERPL-MCNC: 0.75 NG/DL (ref 0.71–1.51)
TDI LATERAL: 0.05 M/S
TDI SEPTAL: 0.05 M/S
TDI: 0.05 M/S
TOXICOLOGY INFORMATION: NORMAL
TR MAX PG: 21 MMHG
TRICUSPID ANNULAR PLANE SYSTOLIC EXCURSION: 2 CM
TROPONIN I SERPL DL<=0.01 NG/ML-MCNC: 0.01 NG/ML (ref 0–0.03)
TSH SERPL DL<=0.005 MIU/L-ACNC: 0.13 UIU/ML (ref 0.4–4)
TV REST PULMONARY ARTERY PRESSURE: 24 MMHG
URN SPEC COLLECT METH UR: ABNORMAL
URN SPEC COLLECT METH UR: ABNORMAL
UROBILINOGEN UR STRIP-ACNC: NEGATIVE EU/DL
UROBILINOGEN UR STRIP-ACNC: NEGATIVE EU/DL
WBC # BLD AUTO: 5.96 K/UL (ref 3.9–12.7)
WBC #/AREA URNS HPF: 1 /HPF (ref 0–5)

## 2021-07-02 PROCEDURE — 83605 ASSAY OF LACTIC ACID: CPT | Performed by: EMERGENCY MEDICINE

## 2021-07-02 PROCEDURE — 84484 ASSAY OF TROPONIN QUANT: CPT | Performed by: EMERGENCY MEDICINE

## 2021-07-02 PROCEDURE — 25000003 PHARM REV CODE 250: Performed by: NURSE PRACTITIONER

## 2021-07-02 PROCEDURE — 81003 URINALYSIS AUTO W/O SCOPE: CPT | Mod: 59 | Performed by: NURSE PRACTITIONER

## 2021-07-02 PROCEDURE — 86803 HEPATITIS C AB TEST: CPT | Performed by: EMERGENCY MEDICINE

## 2021-07-02 PROCEDURE — G0378 HOSPITAL OBSERVATION PER HR: HCPCS

## 2021-07-02 PROCEDURE — 92610 EVALUATE SWALLOWING FUNCTION: CPT

## 2021-07-02 PROCEDURE — 96360 HYDRATION IV INFUSION INIT: CPT

## 2021-07-02 PROCEDURE — 93005 ELECTROCARDIOGRAM TRACING: CPT

## 2021-07-02 PROCEDURE — C9399 UNCLASSIFIED DRUGS OR BIOLOG: HCPCS | Performed by: NURSE PRACTITIONER

## 2021-07-02 PROCEDURE — 63600175 PHARM REV CODE 636 W HCPCS: Performed by: NURSE PRACTITIONER

## 2021-07-02 PROCEDURE — 84439 ASSAY OF FREE THYROXINE: CPT | Performed by: NURSE PRACTITIONER

## 2021-07-02 PROCEDURE — 36415 COLL VENOUS BLD VENIPUNCTURE: CPT | Performed by: NURSE PRACTITIONER

## 2021-07-02 PROCEDURE — 84443 ASSAY THYROID STIM HORMONE: CPT | Performed by: NURSE PRACTITIONER

## 2021-07-02 PROCEDURE — 99220 PR INITIAL OBSERVATION CARE,LEVL III: ICD-10-PCS | Mod: ,,, | Performed by: NURSE PRACTITIONER

## 2021-07-02 PROCEDURE — 99220 PR INITIAL OBSERVATION CARE,LEVL III: CPT | Mod: ,,, | Performed by: NURSE PRACTITIONER

## 2021-07-02 PROCEDURE — 99205 OFFICE O/P NEW HI 60 MIN: CPT | Mod: GT,,, | Performed by: PSYCHIATRY & NEUROLOGY

## 2021-07-02 PROCEDURE — 82962 GLUCOSE BLOOD TEST: CPT

## 2021-07-02 PROCEDURE — 99285 EMERGENCY DEPT VISIT HI MDM: CPT | Mod: 25

## 2021-07-02 PROCEDURE — 85610 PROTHROMBIN TIME: CPT | Performed by: EMERGENCY MEDICINE

## 2021-07-02 PROCEDURE — 81000 URINALYSIS NONAUTO W/SCOPE: CPT | Mod: 59 | Performed by: EMERGENCY MEDICINE

## 2021-07-02 PROCEDURE — 25000003 PHARM REV CODE 250: Performed by: EMERGENCY MEDICINE

## 2021-07-02 PROCEDURE — 80053 COMPREHEN METABOLIC PANEL: CPT | Performed by: EMERGENCY MEDICINE

## 2021-07-02 PROCEDURE — 80307 DRUG TEST PRSMV CHEM ANLYZR: CPT | Performed by: EMERGENCY MEDICINE

## 2021-07-02 PROCEDURE — 25500020 PHARM REV CODE 255: Performed by: EMERGENCY MEDICINE

## 2021-07-02 PROCEDURE — 96372 THER/PROPH/DIAG INJ SC/IM: CPT | Mod: 59 | Performed by: EMERGENCY MEDICINE

## 2021-07-02 PROCEDURE — 87389 HIV-1 AG W/HIV-1&-2 AB AG IA: CPT | Performed by: EMERGENCY MEDICINE

## 2021-07-02 PROCEDURE — 85025 COMPLETE CBC W/AUTO DIFF WBC: CPT | Performed by: EMERGENCY MEDICINE

## 2021-07-02 PROCEDURE — 94761 N-INVAS EAR/PLS OXIMETRY MLT: CPT

## 2021-07-02 PROCEDURE — 99205 PR OFFICE/OUTPT VISIT, NEW, LEVL V, 60-74 MIN: ICD-10-PCS | Mod: GT,,, | Performed by: PSYCHIATRY & NEUROLOGY

## 2021-07-02 RX ORDER — ATORVASTATIN CALCIUM 20 MG/1
80 TABLET, FILM COATED ORAL NIGHTLY
Status: DISCONTINUED | OUTPATIENT
Start: 2021-07-02 | End: 2021-07-03 | Stop reason: HOSPADM

## 2021-07-02 RX ORDER — IBUPROFEN 200 MG
16 TABLET ORAL
Status: DISCONTINUED | OUTPATIENT
Start: 2021-07-02 | End: 2021-07-03 | Stop reason: HOSPADM

## 2021-07-02 RX ORDER — SODIUM CHLORIDE 0.9 % (FLUSH) 0.9 %
10 SYRINGE (ML) INJECTION
Status: DISCONTINUED | OUTPATIENT
Start: 2021-07-02 | End: 2021-07-03 | Stop reason: HOSPADM

## 2021-07-02 RX ORDER — SODIUM CHLORIDE 9 MG/ML
INJECTION, SOLUTION INTRAVENOUS
Status: COMPLETED | OUTPATIENT
Start: 2021-07-02 | End: 2021-07-02

## 2021-07-02 RX ORDER — CLOPIDOGREL 300 MG/1
300 TABLET, FILM COATED ORAL
Status: COMPLETED | OUTPATIENT
Start: 2021-07-02 | End: 2021-07-02

## 2021-07-02 RX ORDER — LABETALOL HYDROCHLORIDE 5 MG/ML
10 INJECTION, SOLUTION INTRAVENOUS
Status: DISCONTINUED | OUTPATIENT
Start: 2021-07-02 | End: 2021-07-03 | Stop reason: HOSPADM

## 2021-07-02 RX ORDER — UBIDECARENONE 30 MG
30 CAPSULE ORAL 3 TIMES DAILY
COMMUNITY

## 2021-07-02 RX ORDER — ONDANSETRON 8 MG/1
8 TABLET, ORALLY DISINTEGRATING ORAL EVERY 8 HOURS PRN
Status: DISCONTINUED | OUTPATIENT
Start: 2021-07-02 | End: 2021-07-03 | Stop reason: HOSPADM

## 2021-07-02 RX ORDER — CLOPIDOGREL BISULFATE 75 MG/1
75 TABLET ORAL DAILY
Status: DISCONTINUED | OUTPATIENT
Start: 2021-07-03 | End: 2021-07-03 | Stop reason: HOSPADM

## 2021-07-02 RX ORDER — POLYETHYLENE GLYCOL 3350 17 G/17G
17 POWDER, FOR SOLUTION ORAL 2 TIMES DAILY PRN
Status: DISCONTINUED | OUTPATIENT
Start: 2021-07-02 | End: 2021-07-03 | Stop reason: HOSPADM

## 2021-07-02 RX ORDER — GLUCAGON 1 MG
1 KIT INJECTION
Status: DISCONTINUED | OUTPATIENT
Start: 2021-07-02 | End: 2021-07-03 | Stop reason: HOSPADM

## 2021-07-02 RX ORDER — INSULIN ASPART 100 [IU]/ML
0-5 INJECTION, SOLUTION INTRAVENOUS; SUBCUTANEOUS
Status: DISCONTINUED | OUTPATIENT
Start: 2021-07-02 | End: 2021-07-03 | Stop reason: HOSPADM

## 2021-07-02 RX ORDER — ASPIRIN 81 MG/1
81 TABLET ORAL DAILY
Status: DISCONTINUED | OUTPATIENT
Start: 2021-07-02 | End: 2021-07-03 | Stop reason: HOSPADM

## 2021-07-02 RX ORDER — IBUPROFEN 200 MG
24 TABLET ORAL
Status: DISCONTINUED | OUTPATIENT
Start: 2021-07-02 | End: 2021-07-03 | Stop reason: HOSPADM

## 2021-07-02 RX ORDER — HEPARIN SODIUM 5000 [USP'U]/ML
5000 INJECTION, SOLUTION INTRAVENOUS; SUBCUTANEOUS EVERY 8 HOURS
Status: DISCONTINUED | OUTPATIENT
Start: 2021-07-02 | End: 2021-07-03 | Stop reason: HOSPADM

## 2021-07-02 RX ADMIN — ASPIRIN 81 MG: 81 TABLET, COATED ORAL at 02:07

## 2021-07-02 RX ADMIN — HEPARIN SODIUM 5000 UNITS: 5000 INJECTION INTRAVENOUS; SUBCUTANEOUS at 09:07

## 2021-07-02 RX ADMIN — CLOPIDOGREL BISULFATE 300 MG: 300 TABLET, FILM COATED ORAL at 12:07

## 2021-07-02 RX ADMIN — INSULIN DETEMIR 15 UNITS: 100 INJECTION, SOLUTION SUBCUTANEOUS at 09:07

## 2021-07-02 RX ADMIN — SODIUM CHLORIDE: 0.9 INJECTION, SOLUTION INTRAVENOUS at 10:07

## 2021-07-02 RX ADMIN — HEPARIN SODIUM 5000 UNITS: 5000 INJECTION INTRAVENOUS; SUBCUTANEOUS at 02:07

## 2021-07-02 RX ADMIN — IOHEXOL 100 ML: 350 INJECTION, SOLUTION INTRAVENOUS at 10:07

## 2021-07-03 VITALS
HEART RATE: 75 BPM | DIASTOLIC BLOOD PRESSURE: 71 MMHG | SYSTOLIC BLOOD PRESSURE: 147 MMHG | OXYGEN SATURATION: 98 % | TEMPERATURE: 98 F | BODY MASS INDEX: 23.74 KG/M2 | RESPIRATION RATE: 20 BRPM | HEIGHT: 66 IN | WEIGHT: 147.69 LBS

## 2021-07-03 PROBLEM — I65.21 CAROTID STENOSIS, RIGHT: Status: ACTIVE | Noted: 2021-07-03

## 2021-07-03 LAB
ALBUMIN SERPL BCP-MCNC: 3.8 G/DL (ref 3.5–5.2)
ALP SERPL-CCNC: 68 U/L (ref 55–135)
ALT SERPL W/O P-5'-P-CCNC: 15 U/L (ref 10–44)
ANION GAP SERPL CALC-SCNC: 7 MMOL/L (ref 8–16)
APTT BLDCRRT: 30.7 SEC (ref 21–32)
AST SERPL-CCNC: 15 U/L (ref 10–40)
BASOPHILS # BLD AUTO: 0.04 K/UL (ref 0–0.2)
BASOPHILS NFR BLD: 0.8 % (ref 0–1.9)
BILIRUB SERPL-MCNC: 0.4 MG/DL (ref 0.1–1)
BUN SERPL-MCNC: 19 MG/DL (ref 8–23)
CALCIUM SERPL-MCNC: 9.9 MG/DL (ref 8.7–10.5)
CHLORIDE SERPL-SCNC: 111 MMOL/L (ref 95–110)
CHOLEST SERPL-MCNC: 102 MG/DL (ref 120–199)
CHOLEST SERPL-MCNC: 97 MG/DL (ref 120–199)
CHOLEST/HDLC SERPL: 2.8 {RATIO} (ref 2–5)
CHOLEST/HDLC SERPL: 2.9 {RATIO} (ref 2–5)
CK MB SERPL-MCNC: 0.9 NG/ML (ref 0.1–6.5)
CK MB SERPL-RTO: 1.1 % (ref 0–5)
CK SERPL-CCNC: 81 U/L (ref 20–180)
CO2 SERPL-SCNC: 22 MMOL/L (ref 23–29)
CREAT SERPL-MCNC: 1.1 MG/DL (ref 0.5–1.4)
DIFFERENTIAL METHOD: ABNORMAL
EOSINOPHIL # BLD AUTO: 0.1 K/UL (ref 0–0.5)
EOSINOPHIL NFR BLD: 1.4 % (ref 0–8)
ERYTHROCYTE [DISTWIDTH] IN BLOOD BY AUTOMATED COUNT: 13 % (ref 11.5–14.5)
EST. GFR  (AFRICAN AMERICAN): >60 ML/MIN/1.73 M^2
EST. GFR  (NON AFRICAN AMERICAN): 53 ML/MIN/1.73 M^2
ESTIMATED AVG GLUCOSE: 146 MG/DL (ref 68–131)
ESTIMATED AVG GLUCOSE: 148 MG/DL (ref 68–131)
GLUCOSE SERPL-MCNC: 161 MG/DL (ref 70–110)
HBA1C MFR BLD: 6.7 % (ref 4–5.6)
HBA1C MFR BLD: 6.8 % (ref 4–5.6)
HCT VFR BLD AUTO: 32.2 % (ref 37–48.5)
HDLC SERPL-MCNC: 34 MG/DL (ref 40–75)
HDLC SERPL-MCNC: 36 MG/DL (ref 40–75)
HDLC SERPL: 35.1 % (ref 20–50)
HDLC SERPL: 35.3 % (ref 20–50)
HGB BLD-MCNC: 10 G/DL (ref 12–16)
IMM GRANULOCYTES # BLD AUTO: 0.01 K/UL (ref 0–0.04)
IMM GRANULOCYTES NFR BLD AUTO: 0.2 % (ref 0–0.5)
INR PPP: 1 (ref 0.8–1.2)
LDLC SERPL CALC-MCNC: 16.6 MG/DL (ref 63–159)
LDLC SERPL CALC-MCNC: 37.2 MG/DL (ref 63–159)
LYMPHOCYTES # BLD AUTO: 1.6 K/UL (ref 1–4.8)
LYMPHOCYTES NFR BLD: 30.5 % (ref 18–48)
MAGNESIUM SERPL-MCNC: 1.5 MG/DL (ref 1.6–2.6)
MCH RBC QN AUTO: 27 PG (ref 27–31)
MCHC RBC AUTO-ENTMCNC: 31.1 G/DL (ref 32–36)
MCV RBC AUTO: 87 FL (ref 82–98)
MONOCYTES # BLD AUTO: 0.4 K/UL (ref 0.3–1)
MONOCYTES NFR BLD: 7.1 % (ref 4–15)
NEUTROPHILS # BLD AUTO: 3.1 K/UL (ref 1.8–7.7)
NEUTROPHILS NFR BLD: 60 % (ref 38–73)
NONHDLC SERPL-MCNC: 63 MG/DL
NONHDLC SERPL-MCNC: 66 MG/DL
NRBC BLD-RTO: 0 /100 WBC
PHOSPHATE SERPL-MCNC: 3.5 MG/DL (ref 2.7–4.5)
PLATELET # BLD AUTO: 284 K/UL (ref 150–450)
PMV BLD AUTO: 9.8 FL (ref 9.2–12.9)
POCT GLUCOSE: 149 MG/DL (ref 70–110)
POCT GLUCOSE: 150 MG/DL (ref 70–110)
POCT GLUCOSE: 203 MG/DL (ref 70–110)
POCT GLUCOSE: 324 MG/DL (ref 70–110)
POCT GLUCOSE: 343 MG/DL (ref 70–110)
POTASSIUM SERPL-SCNC: 4.4 MMOL/L (ref 3.5–5.1)
PROT SERPL-MCNC: 7.1 G/DL (ref 6–8.4)
PROTHROMBIN TIME: 11.4 SEC (ref 9–12.5)
RBC # BLD AUTO: 3.7 M/UL (ref 4–5.4)
SODIUM SERPL-SCNC: 140 MMOL/L (ref 136–145)
TRIGL SERPL-MCNC: 129 MG/DL (ref 30–150)
TRIGL SERPL-MCNC: 247 MG/DL (ref 30–150)
TROPONIN I SERPL DL<=0.01 NG/ML-MCNC: <0.006 NG/ML (ref 0–0.03)
TSH SERPL DL<=0.005 MIU/L-ACNC: 1.06 UIU/ML (ref 0.4–4)
WBC # BLD AUTO: 5.09 K/UL (ref 3.9–12.7)

## 2021-07-03 PROCEDURE — G0378 HOSPITAL OBSERVATION PER HR: HCPCS

## 2021-07-03 PROCEDURE — 63600175 PHARM REV CODE 636 W HCPCS: Performed by: NURSE PRACTITIONER

## 2021-07-03 PROCEDURE — 85025 COMPLETE CBC W/AUTO DIFF WBC: CPT | Performed by: NURSE PRACTITIONER

## 2021-07-03 PROCEDURE — 84443 ASSAY THYROID STIM HORMONE: CPT | Performed by: EMERGENCY MEDICINE

## 2021-07-03 PROCEDURE — 84100 ASSAY OF PHOSPHORUS: CPT | Performed by: NURSE PRACTITIONER

## 2021-07-03 PROCEDURE — 83036 HEMOGLOBIN GLYCOSYLATED A1C: CPT | Mod: 91 | Performed by: EMERGENCY MEDICINE

## 2021-07-03 PROCEDURE — 84484 ASSAY OF TROPONIN QUANT: CPT | Performed by: NURSE PRACTITIONER

## 2021-07-03 PROCEDURE — 82553 CREATINE MB FRACTION: CPT | Performed by: NURSE PRACTITIONER

## 2021-07-03 PROCEDURE — 99214 OFFICE O/P EST MOD 30 MIN: CPT | Mod: GT,,, | Performed by: NURSE PRACTITIONER

## 2021-07-03 PROCEDURE — 96372 THER/PROPH/DIAG INJ SC/IM: CPT | Mod: 59 | Performed by: EMERGENCY MEDICINE

## 2021-07-03 PROCEDURE — 83036 HEMOGLOBIN GLYCOSYLATED A1C: CPT | Performed by: NURSE PRACTITIONER

## 2021-07-03 PROCEDURE — 80061 LIPID PANEL: CPT | Performed by: NURSE PRACTITIONER

## 2021-07-03 PROCEDURE — 99214 PR OFFICE/OUTPT VISIT, EST, LEVL IV, 30-39 MIN: ICD-10-PCS | Mod: GT,,, | Performed by: NURSE PRACTITIONER

## 2021-07-03 PROCEDURE — 83735 ASSAY OF MAGNESIUM: CPT | Performed by: NURSE PRACTITIONER

## 2021-07-03 PROCEDURE — 97165 OT EVAL LOW COMPLEX 30 MIN: CPT

## 2021-07-03 PROCEDURE — 85610 PROTHROMBIN TIME: CPT | Performed by: NURSE PRACTITIONER

## 2021-07-03 PROCEDURE — 82550 ASSAY OF CK (CPK): CPT | Performed by: NURSE PRACTITIONER

## 2021-07-03 PROCEDURE — 36415 COLL VENOUS BLD VENIPUNCTURE: CPT | Performed by: EMERGENCY MEDICINE

## 2021-07-03 PROCEDURE — 99217 PR OBSERVATION CARE DISCHARGE: CPT | Mod: ,,, | Performed by: NURSE PRACTITIONER

## 2021-07-03 PROCEDURE — 80061 LIPID PANEL: CPT | Mod: 91 | Performed by: EMERGENCY MEDICINE

## 2021-07-03 PROCEDURE — 97162 PT EVAL MOD COMPLEX 30 MIN: CPT

## 2021-07-03 PROCEDURE — 25000003 PHARM REV CODE 250: Performed by: NURSE PRACTITIONER

## 2021-07-03 PROCEDURE — 36415 COLL VENOUS BLD VENIPUNCTURE: CPT | Performed by: NURSE PRACTITIONER

## 2021-07-03 PROCEDURE — 85730 THROMBOPLASTIN TIME PARTIAL: CPT | Performed by: NURSE PRACTITIONER

## 2021-07-03 PROCEDURE — 80053 COMPREHEN METABOLIC PANEL: CPT | Performed by: NURSE PRACTITIONER

## 2021-07-03 PROCEDURE — 99217 PR OBSERVATION CARE DISCHARGE: ICD-10-PCS | Mod: ,,, | Performed by: NURSE PRACTITIONER

## 2021-07-03 RX ORDER — CLOPIDOGREL BISULFATE 75 MG/1
75 TABLET ORAL DAILY
Qty: 21 TABLET | Refills: 0 | Status: SHIPPED | OUTPATIENT
Start: 2021-07-04 | End: 2021-11-03

## 2021-07-03 RX ORDER — SODIUM CHLORIDE 0.9 % (FLUSH) 0.9 %
10 SYRINGE (ML) INJECTION
Status: DISCONTINUED | OUTPATIENT
Start: 2021-07-03 | End: 2021-07-03

## 2021-07-03 RX ORDER — ASPIRIN 325 MG
325 TABLET, DELAYED RELEASE (ENTERIC COATED) ORAL DAILY
Status: DISCONTINUED | OUTPATIENT
Start: 2021-07-03 | End: 2021-07-03

## 2021-07-03 RX ORDER — LANOLIN ALCOHOL/MO/W.PET/CERES
1000 CREAM (GRAM) TOPICAL NIGHTLY
Status: DISCONTINUED | OUTPATIENT
Start: 2021-07-03 | End: 2021-07-03

## 2021-07-03 RX ORDER — LABETALOL HYDROCHLORIDE 5 MG/ML
10 INJECTION, SOLUTION INTRAVENOUS
Status: DISCONTINUED | OUTPATIENT
Start: 2021-07-03 | End: 2021-07-03 | Stop reason: SDUPTHER

## 2021-07-03 RX ORDER — HEPARIN SODIUM 5000 [USP'U]/ML
5000 INJECTION, SOLUTION INTRAVENOUS; SUBCUTANEOUS EVERY 8 HOURS
Status: DISCONTINUED | OUTPATIENT
Start: 2021-07-03 | End: 2021-07-03 | Stop reason: SDUPTHER

## 2021-07-03 RX ADMIN — ASPIRIN 81 MG: 81 TABLET, COATED ORAL at 10:07

## 2021-07-03 RX ADMIN — INSULIN ASPART 4 UNITS: 100 INJECTION, SOLUTION INTRAVENOUS; SUBCUTANEOUS at 06:07

## 2021-07-03 RX ADMIN — HEPARIN SODIUM 5000 UNITS: 5000 INJECTION INTRAVENOUS; SUBCUTANEOUS at 06:07

## 2021-07-03 RX ADMIN — CLOPIDOGREL BISULFATE 75 MG: 75 TABLET, FILM COATED ORAL at 10:07

## 2021-07-06 ENCOUNTER — TELEPHONE (OUTPATIENT)
Dept: RESEARCH | Facility: HOSPITAL | Age: 66
End: 2021-07-06

## 2021-07-08 ENCOUNTER — TELEPHONE (OUTPATIENT)
Dept: RESEARCH | Facility: HOSPITAL | Age: 66
End: 2021-07-08

## 2021-07-08 ENCOUNTER — TELEPHONE (OUTPATIENT)
Dept: NEUROLOGY | Facility: CLINIC | Age: 66
End: 2021-07-08

## 2021-07-08 DIAGNOSIS — G45.1 TIA INVOLVING CAROTID ARTERY: Primary | ICD-10-CM

## 2021-07-15 DIAGNOSIS — I65.23 CAROTID STENOSIS, BILATERAL: Primary | ICD-10-CM

## 2021-07-15 DIAGNOSIS — Z00.6 EXAMINATION OF PARTICIPANT IN CLINICAL TRIAL: ICD-10-CM

## 2021-07-20 ENCOUNTER — LAB VISIT (OUTPATIENT)
Dept: LAB | Facility: HOSPITAL | Age: 66
End: 2021-07-20
Attending: INTERNAL MEDICINE
Payer: COMMERCIAL

## 2021-07-20 ENCOUNTER — OFFICE VISIT (OUTPATIENT)
Dept: CARDIOLOGY | Facility: CLINIC | Age: 66
End: 2021-07-20
Payer: COMMERCIAL

## 2021-07-20 ENCOUNTER — EDUCATION (OUTPATIENT)
Dept: CARDIOLOGY | Facility: CLINIC | Age: 66
End: 2021-07-20

## 2021-07-20 VITALS
HEIGHT: 67 IN | WEIGHT: 145.06 LBS | SYSTOLIC BLOOD PRESSURE: 127 MMHG | DIASTOLIC BLOOD PRESSURE: 66 MMHG | HEART RATE: 68 BPM | OXYGEN SATURATION: 100 % | BODY MASS INDEX: 22.77 KG/M2

## 2021-07-20 DIAGNOSIS — Z00.6 EXAMINATION OF PARTICIPANT IN CLINICAL TRIAL: ICD-10-CM

## 2021-07-20 DIAGNOSIS — G45.1 TIA INVOLVING CAROTID ARTERY: ICD-10-CM

## 2021-07-20 DIAGNOSIS — I65.23 CAROTID STENOSIS, BILATERAL: ICD-10-CM

## 2021-07-20 DIAGNOSIS — I65.21 CAROTID STENOSIS, RIGHT: ICD-10-CM

## 2021-07-20 DIAGNOSIS — I65.21 STENOSIS OF RIGHT CAROTID ARTERY: Primary | ICD-10-CM

## 2021-07-20 DIAGNOSIS — G45.9 TIA (TRANSIENT ISCHEMIC ATTACK): ICD-10-CM

## 2021-07-20 LAB
ALBUMIN SERPL BCP-MCNC: 4.2 G/DL (ref 3.5–5.2)
ALP SERPL-CCNC: 82 U/L (ref 55–135)
ALT SERPL W/O P-5'-P-CCNC: 13 U/L (ref 10–44)
ALT SERPL W/O P-5'-P-CCNC: 13 U/L (ref 10–44)
ANION GAP SERPL CALC-SCNC: 10 MMOL/L (ref 8–16)
APTT BLDCRRT: 25 SEC (ref 21–32)
AST SERPL-CCNC: 16 U/L (ref 10–40)
AST SERPL-CCNC: 16 U/L (ref 10–40)
BASOPHILS # BLD AUTO: 0.02 K/UL (ref 0–0.2)
BASOPHILS NFR BLD: 0.3 % (ref 0–1.9)
BILIRUB SERPL-MCNC: 0.5 MG/DL (ref 0.1–1)
BUN SERPL-MCNC: 20 MG/DL (ref 8–23)
CALCIUM SERPL-MCNC: 10.8 MG/DL (ref 8.7–10.5)
CHLORIDE SERPL-SCNC: 109 MMOL/L (ref 95–110)
CHOLEST SERPL-MCNC: 114 MG/DL (ref 120–199)
CHOLEST/HDLC SERPL: 3.3 {RATIO} (ref 2–5)
CK SERPL-CCNC: 53 U/L (ref 20–180)
CO2 SERPL-SCNC: 24 MMOL/L (ref 23–29)
CREAT SERPL-MCNC: 1.2 MG/DL (ref 0.5–1.4)
DIFFERENTIAL METHOD: ABNORMAL
EOSINOPHIL # BLD AUTO: 0.1 K/UL (ref 0–0.5)
EOSINOPHIL NFR BLD: 0.8 % (ref 0–8)
ERYTHROCYTE [DISTWIDTH] IN BLOOD BY AUTOMATED COUNT: 12.9 % (ref 11.5–14.5)
ERYTHROCYTE [DISTWIDTH] IN BLOOD BY AUTOMATED COUNT: 12.9 % (ref 11.5–14.5)
EST. GFR  (AFRICAN AMERICAN): 55.2 ML/MIN/1.73 M^2
EST. GFR  (NON AFRICAN AMERICAN): 47.9 ML/MIN/1.73 M^2
ESTIMATED AVG GLUCOSE: 146 MG/DL (ref 68–131)
GLUCOSE SERPL-MCNC: 142 MG/DL (ref 70–110)
HBA1C MFR BLD: 6.7 % (ref 4–5.6)
HCT VFR BLD AUTO: 32.6 % (ref 37–48.5)
HCT VFR BLD AUTO: 32.6 % (ref 37–48.5)
HDLC SERPL-MCNC: 35 MG/DL (ref 40–75)
HDLC SERPL: 30.7 % (ref 20–50)
HGB BLD-MCNC: 10 G/DL (ref 12–16)
HGB BLD-MCNC: 10 G/DL (ref 12–16)
IMM GRANULOCYTES # BLD AUTO: 0.01 K/UL (ref 0–0.04)
IMM GRANULOCYTES NFR BLD AUTO: 0.2 % (ref 0–0.5)
INR PPP: 1 (ref 0.8–1.2)
LDLC SERPL CALC-MCNC: 58.2 MG/DL (ref 63–159)
LYMPHOCYTES # BLD AUTO: 1.6 K/UL (ref 1–4.8)
LYMPHOCYTES NFR BLD: 26.2 % (ref 18–48)
MCH RBC QN AUTO: 27 PG (ref 27–31)
MCH RBC QN AUTO: 27 PG (ref 27–31)
MCHC RBC AUTO-ENTMCNC: 30.7 G/DL (ref 32–36)
MCHC RBC AUTO-ENTMCNC: 30.7 G/DL (ref 32–36)
MCV RBC AUTO: 88 FL (ref 82–98)
MCV RBC AUTO: 88 FL (ref 82–98)
MONOCYTES # BLD AUTO: 0.3 K/UL (ref 0.3–1)
MONOCYTES NFR BLD: 5 % (ref 4–15)
NEUTROPHILS # BLD AUTO: 4.2 K/UL (ref 1.8–7.7)
NEUTROPHILS NFR BLD: 67.5 % (ref 38–73)
NONHDLC SERPL-MCNC: 79 MG/DL
NRBC BLD-RTO: 0 /100 WBC
PLATELET # BLD AUTO: 258 K/UL (ref 150–450)
PLATELET # BLD AUTO: 258 K/UL (ref 150–450)
PMV BLD AUTO: 9.9 FL (ref 9.2–12.9)
PMV BLD AUTO: 9.9 FL (ref 9.2–12.9)
POTASSIUM SERPL-SCNC: 5.2 MMOL/L (ref 3.5–5.1)
PROT SERPL-MCNC: 8 G/DL (ref 6–8.4)
PROTHROMBIN TIME: 11.4 SEC (ref 9–12.5)
RBC # BLD AUTO: 3.7 M/UL (ref 4–5.4)
RBC # BLD AUTO: 3.7 M/UL (ref 4–5.4)
SODIUM SERPL-SCNC: 143 MMOL/L (ref 136–145)
TRIGL SERPL-MCNC: 104 MG/DL (ref 30–150)
WBC # BLD AUTO: 6.18 K/UL (ref 3.9–12.7)
WBC # BLD AUTO: 6.18 K/UL (ref 3.9–12.7)

## 2021-07-20 PROCEDURE — 3008F PR BODY MASS INDEX (BMI) DOCUMENTED: ICD-10-PCS | Mod: CPTII,S$GLB,, | Performed by: INTERNAL MEDICINE

## 2021-07-20 PROCEDURE — 3074F PR MOST RECENT SYSTOLIC BLOOD PRESSURE < 130 MM HG: ICD-10-PCS | Mod: CPTII,S$GLB,, | Performed by: INTERNAL MEDICINE

## 2021-07-20 PROCEDURE — 85025 COMPLETE CBC W/AUTO DIFF WBC: CPT | Performed by: INTERNAL MEDICINE

## 2021-07-20 PROCEDURE — 82550 ASSAY OF CK (CPK): CPT | Performed by: INTERNAL MEDICINE

## 2021-07-20 PROCEDURE — 99215 OFFICE O/P EST HI 40 MIN: CPT | Mod: S$GLB,,, | Performed by: INTERNAL MEDICINE

## 2021-07-20 PROCEDURE — 3044F HG A1C LEVEL LT 7.0%: CPT | Mod: CPTII,S$GLB,, | Performed by: INTERNAL MEDICINE

## 2021-07-20 PROCEDURE — 36415 COLL VENOUS BLD VENIPUNCTURE: CPT | Performed by: INTERNAL MEDICINE

## 2021-07-20 PROCEDURE — 99999 PR PBB SHADOW E&M-EST. PATIENT-LVL III: CPT | Mod: PBBFAC,,, | Performed by: INTERNAL MEDICINE

## 2021-07-20 PROCEDURE — 3008F BODY MASS INDEX DOCD: CPT | Mod: CPTII,S$GLB,, | Performed by: INTERNAL MEDICINE

## 2021-07-20 PROCEDURE — 3044F PR MOST RECENT HEMOGLOBIN A1C LEVEL <7.0%: ICD-10-PCS | Mod: CPTII,S$GLB,, | Performed by: INTERNAL MEDICINE

## 2021-07-20 PROCEDURE — 1126F AMNT PAIN NOTED NONE PRSNT: CPT | Mod: CPTII,S$GLB,, | Performed by: INTERNAL MEDICINE

## 2021-07-20 PROCEDURE — 3078F DIAST BP <80 MM HG: CPT | Mod: CPTII,S$GLB,, | Performed by: INTERNAL MEDICINE

## 2021-07-20 PROCEDURE — 1126F PR PAIN SEVERITY QUANTIFIED, NO PAIN PRESENT: ICD-10-PCS | Mod: CPTII,S$GLB,, | Performed by: INTERNAL MEDICINE

## 2021-07-20 PROCEDURE — 3074F SYST BP LT 130 MM HG: CPT | Mod: CPTII,S$GLB,, | Performed by: INTERNAL MEDICINE

## 2021-07-20 PROCEDURE — 3078F PR MOST RECENT DIASTOLIC BLOOD PRESSURE < 80 MM HG: ICD-10-PCS | Mod: CPTII,S$GLB,, | Performed by: INTERNAL MEDICINE

## 2021-07-20 PROCEDURE — 80061 LIPID PANEL: CPT | Performed by: INTERNAL MEDICINE

## 2021-07-20 PROCEDURE — 80053 COMPREHEN METABOLIC PANEL: CPT | Performed by: INTERNAL MEDICINE

## 2021-07-20 PROCEDURE — 99999 PR PBB SHADOW E&M-EST. PATIENT-LVL III: ICD-10-PCS | Mod: PBBFAC,,, | Performed by: INTERNAL MEDICINE

## 2021-07-20 PROCEDURE — 85730 THROMBOPLASTIN TIME PARTIAL: CPT | Performed by: INTERNAL MEDICINE

## 2021-07-20 PROCEDURE — 83036 HEMOGLOBIN GLYCOSYLATED A1C: CPT | Performed by: INTERNAL MEDICINE

## 2021-07-20 PROCEDURE — 99215 PR OFFICE/OUTPT VISIT, EST, LEVL V, 40-54 MIN: ICD-10-PCS | Mod: S$GLB,,, | Performed by: INTERNAL MEDICINE

## 2021-07-20 PROCEDURE — 85610 PROTHROMBIN TIME: CPT | Performed by: INTERNAL MEDICINE

## 2021-07-20 RX ORDER — DIPHENHYDRAMINE HCL 50 MG
50 CAPSULE ORAL ONCE
Status: CANCELLED | OUTPATIENT
Start: 2021-07-20 | End: 2021-07-20

## 2021-07-20 RX ORDER — SODIUM CHLORIDE 9 MG/ML
INJECTION, SOLUTION INTRAVENOUS CONTINUOUS
Status: CANCELLED | OUTPATIENT
Start: 2021-07-20 | End: 2021-07-20

## 2021-08-11 ENCOUNTER — OFFICE VISIT (OUTPATIENT)
Dept: NEUROLOGY | Facility: CLINIC | Age: 66
End: 2021-08-11
Payer: COMMERCIAL

## 2021-08-11 VITALS
BODY MASS INDEX: 22.85 KG/M2 | HEART RATE: 76 BPM | SYSTOLIC BLOOD PRESSURE: 128 MMHG | DIASTOLIC BLOOD PRESSURE: 71 MMHG | WEIGHT: 143.88 LBS

## 2021-08-11 DIAGNOSIS — I10 ESSENTIAL HYPERTENSION: ICD-10-CM

## 2021-08-11 DIAGNOSIS — E78.2 MIXED HYPERLIPIDEMIA: ICD-10-CM

## 2021-08-11 DIAGNOSIS — I65.21 CAROTID STENOSIS, RIGHT: ICD-10-CM

## 2021-08-11 DIAGNOSIS — Z79.4 TYPE 2 DIABETES MELLITUS WITH OTHER SPECIFIED COMPLICATION, WITH LONG-TERM CURRENT USE OF INSULIN: ICD-10-CM

## 2021-08-11 DIAGNOSIS — G45.9 TIA (TRANSIENT ISCHEMIC ATTACK): Primary | ICD-10-CM

## 2021-08-11 DIAGNOSIS — E11.69 TYPE 2 DIABETES MELLITUS WITH OTHER SPECIFIED COMPLICATION, WITH LONG-TERM CURRENT USE OF INSULIN: ICD-10-CM

## 2021-08-11 PROCEDURE — 3074F SYST BP LT 130 MM HG: CPT | Mod: CPTII,S$GLB,, | Performed by: NURSE PRACTITIONER

## 2021-08-11 PROCEDURE — 99999 PR PBB SHADOW E&M-EST. PATIENT-LVL III: CPT | Mod: PBBFAC,,, | Performed by: NURSE PRACTITIONER

## 2021-08-11 PROCEDURE — 1159F PR MEDICATION LIST DOCUMENTED IN MEDICAL RECORD: ICD-10-PCS | Mod: CPTII,S$GLB,, | Performed by: NURSE PRACTITIONER

## 2021-08-11 PROCEDURE — 99214 PR OFFICE/OUTPT VISIT, EST, LEVL IV, 30-39 MIN: ICD-10-PCS | Mod: S$GLB,,, | Performed by: NURSE PRACTITIONER

## 2021-08-11 PROCEDURE — 1160F PR REVIEW ALL MEDS BY PRESCRIBER/CLIN PHARMACIST DOCUMENTED: ICD-10-PCS | Mod: CPTII,S$GLB,, | Performed by: NURSE PRACTITIONER

## 2021-08-11 PROCEDURE — 3078F DIAST BP <80 MM HG: CPT | Mod: CPTII,S$GLB,, | Performed by: NURSE PRACTITIONER

## 2021-08-11 PROCEDURE — 1159F MED LIST DOCD IN RCRD: CPT | Mod: CPTII,S$GLB,, | Performed by: NURSE PRACTITIONER

## 2021-08-11 PROCEDURE — 3008F PR BODY MASS INDEX (BMI) DOCUMENTED: ICD-10-PCS | Mod: CPTII,S$GLB,, | Performed by: NURSE PRACTITIONER

## 2021-08-11 PROCEDURE — 3044F HG A1C LEVEL LT 7.0%: CPT | Mod: CPTII,S$GLB,, | Performed by: NURSE PRACTITIONER

## 2021-08-11 PROCEDURE — 1160F RVW MEDS BY RX/DR IN RCRD: CPT | Mod: CPTII,S$GLB,, | Performed by: NURSE PRACTITIONER

## 2021-08-11 PROCEDURE — 3044F PR MOST RECENT HEMOGLOBIN A1C LEVEL <7.0%: ICD-10-PCS | Mod: CPTII,S$GLB,, | Performed by: NURSE PRACTITIONER

## 2021-08-11 PROCEDURE — 3008F BODY MASS INDEX DOCD: CPT | Mod: CPTII,S$GLB,, | Performed by: NURSE PRACTITIONER

## 2021-08-11 PROCEDURE — 3074F PR MOST RECENT SYSTOLIC BLOOD PRESSURE < 130 MM HG: ICD-10-PCS | Mod: CPTII,S$GLB,, | Performed by: NURSE PRACTITIONER

## 2021-08-11 PROCEDURE — 1126F PR PAIN SEVERITY QUANTIFIED, NO PAIN PRESENT: ICD-10-PCS | Mod: CPTII,S$GLB,, | Performed by: NURSE PRACTITIONER

## 2021-08-11 PROCEDURE — 99999 PR PBB SHADOW E&M-EST. PATIENT-LVL III: ICD-10-PCS | Mod: PBBFAC,,, | Performed by: NURSE PRACTITIONER

## 2021-08-11 PROCEDURE — 1126F AMNT PAIN NOTED NONE PRSNT: CPT | Mod: CPTII,S$GLB,, | Performed by: NURSE PRACTITIONER

## 2021-08-11 PROCEDURE — 3078F PR MOST RECENT DIASTOLIC BLOOD PRESSURE < 80 MM HG: ICD-10-PCS | Mod: CPTII,S$GLB,, | Performed by: NURSE PRACTITIONER

## 2021-08-11 PROCEDURE — 99214 OFFICE O/P EST MOD 30 MIN: CPT | Mod: S$GLB,,, | Performed by: NURSE PRACTITIONER

## 2021-09-27 ENCOUNTER — LAB VISIT (OUTPATIENT)
Dept: LAB | Facility: HOSPITAL | Age: 66
End: 2021-09-27
Attending: INTERNAL MEDICINE
Payer: COMMERCIAL

## 2021-09-27 ENCOUNTER — RESEARCH ENCOUNTER (OUTPATIENT)
Dept: RESEARCH | Facility: HOSPITAL | Age: 66
End: 2021-09-27

## 2021-09-27 DIAGNOSIS — Z00.6 EXAMINATION OF PARTICIPANT IN CLINICAL TRIAL: ICD-10-CM

## 2021-09-27 DIAGNOSIS — I77.9 RIGHT-SIDED CAROTID ARTERY DISEASE, UNSPECIFIED TYPE: ICD-10-CM

## 2021-09-27 DIAGNOSIS — I77.9 RIGHT-SIDED CAROTID ARTERY DISEASE, UNSPECIFIED TYPE: Primary | ICD-10-CM

## 2021-09-27 LAB
ESTIMATED AVG GLUCOSE: 137 MG/DL (ref 68–131)
HBA1C MFR BLD: 6.4 % (ref 4–5.6)

## 2021-09-27 PROCEDURE — 36415 COLL VENOUS BLD VENIPUNCTURE: CPT | Performed by: INTERNAL MEDICINE

## 2021-09-27 PROCEDURE — 83036 HEMOGLOBIN GLYCOSYLATED A1C: CPT | Performed by: INTERNAL MEDICINE

## 2021-09-28 ENCOUNTER — TELEPHONE (OUTPATIENT)
Dept: CARDIOLOGY | Facility: CLINIC | Age: 66
End: 2021-09-28

## 2021-09-28 DIAGNOSIS — I10 ESSENTIAL HYPERTENSION: ICD-10-CM

## 2021-09-28 DIAGNOSIS — E87.5 HYPERKALEMIA: ICD-10-CM

## 2021-09-28 RX ORDER — CHLORTHALIDONE 50 MG/1
50 TABLET ORAL DAILY
Qty: 30 TABLET | Refills: 11 | Status: SHIPPED | OUTPATIENT
Start: 2021-09-28 | End: 2021-11-03

## 2021-09-30 ENCOUNTER — TELEPHONE (OUTPATIENT)
Dept: RESEARCH | Facility: HOSPITAL | Age: 66
End: 2021-09-30

## 2021-09-30 ENCOUNTER — TELEPHONE (OUTPATIENT)
Dept: CARDIOLOGY | Facility: CLINIC | Age: 66
End: 2021-09-30

## 2021-10-01 ENCOUNTER — TELEPHONE (OUTPATIENT)
Dept: RESEARCH | Facility: HOSPITAL | Age: 66
End: 2021-10-01

## 2021-10-06 ENCOUNTER — TELEPHONE (OUTPATIENT)
Dept: RESEARCH | Facility: HOSPITAL | Age: 66
End: 2021-10-06

## 2021-10-19 ENCOUNTER — TELEPHONE (OUTPATIENT)
Dept: RESEARCH | Facility: HOSPITAL | Age: 66
End: 2021-10-19

## 2021-10-22 ENCOUNTER — TELEPHONE (OUTPATIENT)
Dept: CARDIOLOGY | Facility: CLINIC | Age: 66
End: 2021-10-22

## 2021-10-22 ENCOUNTER — TELEPHONE (OUTPATIENT)
Dept: RESEARCH | Facility: HOSPITAL | Age: 66
End: 2021-10-22

## 2021-10-22 ENCOUNTER — RESEARCH ENCOUNTER (OUTPATIENT)
Dept: RESEARCH | Facility: HOSPITAL | Age: 66
End: 2021-10-22

## 2021-10-25 ENCOUNTER — TELEPHONE (OUTPATIENT)
Dept: RESEARCH | Facility: HOSPITAL | Age: 66
End: 2021-10-25
Payer: MEDICARE

## 2021-11-03 ENCOUNTER — OFFICE VISIT (OUTPATIENT)
Dept: CARDIOLOGY | Facility: CLINIC | Age: 66
End: 2021-11-03
Payer: COMMERCIAL

## 2021-11-03 VITALS
HEART RATE: 71 BPM | BODY MASS INDEX: 23.38 KG/M2 | DIASTOLIC BLOOD PRESSURE: 70 MMHG | SYSTOLIC BLOOD PRESSURE: 122 MMHG | WEIGHT: 145.5 LBS | HEIGHT: 66 IN | OXYGEN SATURATION: 99 %

## 2021-11-03 DIAGNOSIS — Z79.4 TYPE 2 DIABETES MELLITUS WITH OTHER SPECIFIED COMPLICATION, WITH LONG-TERM CURRENT USE OF INSULIN: ICD-10-CM

## 2021-11-03 DIAGNOSIS — I10 ESSENTIAL HYPERTENSION: Primary | ICD-10-CM

## 2021-11-03 DIAGNOSIS — N18.31 STAGE 3A CHRONIC KIDNEY DISEASE: ICD-10-CM

## 2021-11-03 DIAGNOSIS — E11.69 TYPE 2 DIABETES MELLITUS WITH OTHER SPECIFIED COMPLICATION, WITH LONG-TERM CURRENT USE OF INSULIN: ICD-10-CM

## 2021-11-03 DIAGNOSIS — I65.21 CAROTID STENOSIS, RIGHT: ICD-10-CM

## 2021-11-03 DIAGNOSIS — G45.9 TIA (TRANSIENT ISCHEMIC ATTACK): ICD-10-CM

## 2021-11-03 DIAGNOSIS — I67.9 CVD (CEREBROVASCULAR DISEASE): ICD-10-CM

## 2021-11-03 DIAGNOSIS — E87.5 HYPERKALEMIA: ICD-10-CM

## 2021-11-03 DIAGNOSIS — E78.2 MIXED HYPERLIPIDEMIA: ICD-10-CM

## 2021-11-03 PROCEDURE — 3044F HG A1C LEVEL LT 7.0%: CPT | Mod: CPTII,S$GLB,, | Performed by: INTERNAL MEDICINE

## 2021-11-03 PROCEDURE — 99999 PR PBB SHADOW E&M-EST. PATIENT-LVL IV: CPT | Mod: PBBFAC,,, | Performed by: INTERNAL MEDICINE

## 2021-11-03 PROCEDURE — 99999 PR PBB SHADOW E&M-EST. PATIENT-LVL IV: ICD-10-PCS | Mod: PBBFAC,,, | Performed by: INTERNAL MEDICINE

## 2021-11-03 PROCEDURE — 3008F BODY MASS INDEX DOCD: CPT | Mod: CPTII,S$GLB,, | Performed by: INTERNAL MEDICINE

## 2021-11-03 PROCEDURE — 1101F PT FALLS ASSESS-DOCD LE1/YR: CPT | Mod: CPTII,S$GLB,, | Performed by: INTERNAL MEDICINE

## 2021-11-03 PROCEDURE — 3074F SYST BP LT 130 MM HG: CPT | Mod: CPTII,S$GLB,, | Performed by: INTERNAL MEDICINE

## 2021-11-03 PROCEDURE — 99214 PR OFFICE/OUTPT VISIT, EST, LEVL IV, 30-39 MIN: ICD-10-PCS | Mod: S$GLB,,, | Performed by: INTERNAL MEDICINE

## 2021-11-03 PROCEDURE — 3078F DIAST BP <80 MM HG: CPT | Mod: CPTII,S$GLB,, | Performed by: INTERNAL MEDICINE

## 2021-11-03 PROCEDURE — 1101F PR PT FALLS ASSESS DOC 0-1 FALLS W/OUT INJ PAST YR: ICD-10-PCS | Mod: CPTII,S$GLB,, | Performed by: INTERNAL MEDICINE

## 2021-11-03 PROCEDURE — 3288F PR FALLS RISK ASSESSMENT DOCUMENTED: ICD-10-PCS | Mod: CPTII,S$GLB,, | Performed by: INTERNAL MEDICINE

## 2021-11-03 PROCEDURE — 3074F PR MOST RECENT SYSTOLIC BLOOD PRESSURE < 130 MM HG: ICD-10-PCS | Mod: CPTII,S$GLB,, | Performed by: INTERNAL MEDICINE

## 2021-11-03 PROCEDURE — 3078F PR MOST RECENT DIASTOLIC BLOOD PRESSURE < 80 MM HG: ICD-10-PCS | Mod: CPTII,S$GLB,, | Performed by: INTERNAL MEDICINE

## 2021-11-03 PROCEDURE — 3288F FALL RISK ASSESSMENT DOCD: CPT | Mod: CPTII,S$GLB,, | Performed by: INTERNAL MEDICINE

## 2021-11-03 PROCEDURE — 3044F PR MOST RECENT HEMOGLOBIN A1C LEVEL <7.0%: ICD-10-PCS | Mod: CPTII,S$GLB,, | Performed by: INTERNAL MEDICINE

## 2021-11-03 PROCEDURE — 99214 OFFICE O/P EST MOD 30 MIN: CPT | Mod: S$GLB,,, | Performed by: INTERNAL MEDICINE

## 2021-11-03 PROCEDURE — 1159F MED LIST DOCD IN RCRD: CPT | Mod: CPTII,S$GLB,, | Performed by: INTERNAL MEDICINE

## 2021-11-03 PROCEDURE — 1159F PR MEDICATION LIST DOCUMENTED IN MEDICAL RECORD: ICD-10-PCS | Mod: CPTII,S$GLB,, | Performed by: INTERNAL MEDICINE

## 2021-11-03 PROCEDURE — 3008F PR BODY MASS INDEX (BMI) DOCUMENTED: ICD-10-PCS | Mod: CPTII,S$GLB,, | Performed by: INTERNAL MEDICINE

## 2021-11-03 RX ORDER — CARVEDILOL 12.5 MG/1
12.5 TABLET ORAL 2 TIMES DAILY WITH MEALS
Qty: 180 TABLET | Refills: 3 | Status: SHIPPED | OUTPATIENT
Start: 2021-11-03 | End: 2023-02-03

## 2021-11-03 RX ORDER — CHLORTHALIDONE 25 MG/1
25 TABLET ORAL DAILY
Qty: 90 TABLET | Refills: 3 | Status: SHIPPED | OUTPATIENT
Start: 2021-11-03 | End: 2023-02-02 | Stop reason: SDUPTHER

## 2021-11-22 NOTE — TELEPHONE ENCOUNTER
Sushma Chacon was seen and treated in our emergency department on 11/22/2021. She may return to work on 11/25/2021. If you have any questions or concerns, please don't hesitate to call.       Robin Mayer Unable to reach patient by phone. Will call in the am.

## 2022-02-22 DIAGNOSIS — I77.9 RIGHT-SIDED CAROTID ARTERY DISEASE, UNSPECIFIED TYPE: Primary | ICD-10-CM

## 2022-02-22 DIAGNOSIS — Z00.6 EXAMINATION OF PARTICIPANT IN CLINICAL TRIAL: ICD-10-CM

## 2022-02-23 ENCOUNTER — TELEPHONE (OUTPATIENT)
Dept: RESEARCH | Facility: HOSPITAL | Age: 67
End: 2022-02-23
Payer: MEDICARE

## 2022-02-23 NOTE — TELEPHONE ENCOUNTER
Study title: Carotid Revascularization and Medical Management for Asymptomatic Carotid Stenosis Trial CREST-2  IRB #: [id number]  IRB approval date: 6/5/2018  Sponsor: [facility]  Site Number: [id number]  Subject ID: [id number]  Date of Encounter: 02/23/2022    Called patient to discuss scheduling their final 48M visit for the CREST-2 study. This visit would include carotid ultrasounds, labs, questionnaires, and optional brain MRI w/o contrast. Patient and I agreed on carotid ultrasound for 8:00, labs for 9:00, and research appointment for 9:30. Will schedule accordingly and will send appointment letter. We will schedule the brain MRI on a later date.

## 2022-02-25 ENCOUNTER — TELEPHONE (OUTPATIENT)
Dept: RESEARCH | Facility: HOSPITAL | Age: 67
End: 2022-02-25
Payer: MEDICARE

## 2022-02-25 NOTE — TELEPHONE ENCOUNTER
Study title: Carotid Revascularization and Medical Management for Asymptomatic Carotid Stenosis Trial CREST-2  IRB #: [id number]  IRB approval date: 6/5/2018  Sponsor: [facility]  Site Number: [id number]  Subject ID: [id number]  Date of Encounter: 02/25/2022    Called patient to discuss scheduling their optional End-of-study Brain MRI. Patient did not answer. Message was left to call back.

## 2022-02-28 ENCOUNTER — TELEPHONE (OUTPATIENT)
Dept: RESEARCH | Facility: HOSPITAL | Age: 67
End: 2022-02-28
Payer: MEDICARE

## 2022-02-28 NOTE — TELEPHONE ENCOUNTER
Study title: Carotid Revascularization and Medical Management for Asymptomatic Carotid Stenosis Trial CREST-2  IRB #: [id number]  IRB approval date: 6/5/2018  Sponsor: [facility]  Site Number: [id number]  Subject ID: [id number]  Date of Encounter: 02/28/2022    2nd call to patient to discuss scheduling their optional End-of-study Brain MRI. Patient did not answer. Message was left to call back.

## 2022-03-02 ENCOUNTER — TELEPHONE (OUTPATIENT)
Dept: RESEARCH | Facility: HOSPITAL | Age: 67
End: 2022-03-02
Payer: MEDICARE

## 2022-03-02 NOTE — TELEPHONE ENCOUNTER
Study title: Carotid Revascularization and Medical Management for Asymptomatic Carotid Stenosis Trial CREST-2  IRB #: [id number]  IRB approval date: 6/5/2018  Sponsor: [facility]  Site Number: [id number]  Subject ID: [id number]  Date of Encounter: 03/02/2022    3rd call to patient to discuss scheduling their optional End-of-study Brain MRI. Told patient of availiable time for Brain MRI. Patient will be at the scheduled time. Voiced understanding. Thanked patient for their time.

## 2022-03-07 ENCOUNTER — RESEARCH ENCOUNTER (OUTPATIENT)
Dept: RESEARCH | Facility: HOSPITAL | Age: 67
End: 2022-03-07

## 2022-03-07 ENCOUNTER — HOSPITAL ENCOUNTER (OUTPATIENT)
Dept: CARDIOLOGY | Facility: HOSPITAL | Age: 67
Discharge: HOME | End: 2022-03-07
Attending: INTERNAL MEDICINE
Payer: MEDICARE

## 2022-03-07 DIAGNOSIS — I65.23 CAROTID STENOSIS, BILATERAL: ICD-10-CM

## 2022-03-07 DIAGNOSIS — Z00.6 EXAMINATION OF PARTICIPANT IN CLINICAL TRIAL: ICD-10-CM

## 2022-03-07 LAB
LEFT ARM DIASTOLIC BLOOD PRESSURE: 18 MMHG
LEFT ARM SYSTOLIC BLOOD PRESSURE: 68 MMHG
LEFT CBA DIAS: 13 CM/S
LEFT CBA SYS: 74 CM/S
LEFT CCA DIST DIAS: 14 CM/S
LEFT CCA DIST SYS: 84 CM/S
LEFT CCA MID DIAS: 18 CM/S
LEFT CCA MID SYS: 76 CM/S
LEFT CCA PROX DIAS: 17 CM/S
LEFT CCA PROX SYS: 102 CM/S
LEFT ECA DIAS: 5 CM/S
LEFT ECA SYS: 71 CM/S
LEFT ICA DIST DIAS: 28 CM/S
LEFT ICA DIST SYS: 89 CM/S
LEFT ICA MID DIAS: 33 CM/S
LEFT ICA MID SYS: 107 CM/S
LEFT ICA PROX DIAS: 16 CM/S
LEFT ICA PROX SYS: 48 CM/S
LEFT VERTEBRAL DIAS: 18 CM/S
LEFT VERTEBRAL SYS: 68 CM/S
OHS CV CAROTID RIGHT ICA EDV HIGHEST: 68
OHS CV CAROTID ULTRASOUND LEFT ICA/CCA RATIO: 1.27
OHS CV CAROTID ULTRASOUND RIGHT ICA/CCA RATIO: 5.71
OHS CV PV CAROTID LEFT HIGHEST CCA: 102
OHS CV PV CAROTID LEFT HIGHEST ICA: 107
OHS CV PV CAROTID RIGHT HIGHEST CCA: 76
OHS CV PV CAROTID RIGHT HIGHEST ICA: 291
OHS CV US CAROTID LEFT HIGHEST EDV: 33
RIGHT ARM DIASTOLIC BLOOD PRESSURE: 76 MMHG
RIGHT ARM SYSTOLIC BLOOD PRESSURE: 140 MMHG
RIGHT CBA DIAS: 21 CM/S
RIGHT CBA SYS: 108 CM/S
RIGHT CCA DIST DIAS: 13 CM/S
RIGHT CCA DIST SYS: 51 CM/S
RIGHT CCA MID DIAS: 10 CM/S
RIGHT CCA MID SYS: 58 CM/S
RIGHT CCA PROX DIAS: 14 CM/S
RIGHT CCA PROX SYS: 76 CM/S
RIGHT ECA DIAS: 15 CM/S
RIGHT ECA SYS: 187 CM/S
RIGHT ICA DIST DIAS: 30 CM/S
RIGHT ICA DIST SYS: 74 CM/S
RIGHT ICA MID DIAS: 24 CM/S
RIGHT ICA MID SYS: 74 CM/S
RIGHT ICA PROX DIAS: 68 CM/S
RIGHT ICA PROX SYS: 291 CM/S
RIGHT VERTEBRAL DIAS: 17 CM/S
RIGHT VERTEBRAL SYS: 56 CM/S

## 2022-03-07 PROCEDURE — 93880 EXTRACRANIAL BILAT STUDY: CPT

## 2022-03-07 PROCEDURE — 93880 EXTRACRANIAL BILAT STUDY: CPT | Mod: 26,,, | Performed by: INTERNAL MEDICINE

## 2022-03-07 PROCEDURE — 93880 CV US DOPPLER CAROTID (CUPID ONLY): ICD-10-PCS | Mod: 26,,, | Performed by: INTERNAL MEDICINE

## 2022-03-07 NOTE — PROGRESS NOTES
Study: CREST 2  Sponsor: [facility]  Follow-up Visit: FINAL 48-Month  Subject ID: [id number]  Date of Visit:2022     I met with patient for their FINAL 48-Month visit for the CREST 2 study. Patient was in good spirits stated that he has been compliant with his medication. Patient still on 325mg aspirin. Since last visit, patient had adjusted doses of their carvedilol and chlorthalidone, per Dr. Lee on 11/3/2021. Patient denied any dizziness/lightheadedness symptoms. No contact information has changed since last visit. This visit consisted of lab work, questionnaires, Modified [place] Scale (mRS), NIH Stroke Scale, BP check, and cognitive phone call.     Lab work was completed.  All Questionnaires were answered. No stroke symptoms were noted.  mRS score of 0. NIH stroke scale sore of 1. On sensory portion, patient felt prick less on their left cheek than right cheek. When pricking both ankle areas, patient did not feel pricks as much but stated they felt the same. However, when patient was to identify which side of body I touched with their eyes closed, patient was able to correctly identify which ankle was touched.  Cognitive phone call was scheduled for 3/9/2022 @ 10:00.    3 sitting and 1 standing BP checks were done using the Omron study device. The readings are as followed:     1st sittin/75; HR: 72  2nd sittin/71; HR: 71  3rd sittin/74; HR: 71  Avg. Sittin/73     Lowest Heart rate: 71    Weight: 67.7 kg     Patient's medical physician, Dr. Lee, will be notified of their BP readings. This was the last scheduled visit for the CREST-2 study. Patient was reminded of EOS Brain MRI scan for 3/9/2022 @ 6:45 AM. Stipend of $95.00 will be given to patient as per [employer] system after cognitive assessment call is completed. Patient was given a FitBit through [employer], as part of graduating from the CREST-2 program. Will follow up with a CREST-2 certificate. Thanked patient for their time and  participation.

## 2022-03-09 ENCOUNTER — TELEPHONE (OUTPATIENT)
Dept: CARDIOLOGY | Facility: CLINIC | Age: 67
End: 2022-03-09
Payer: MEDICARE

## 2022-03-09 ENCOUNTER — TELEPHONE (OUTPATIENT)
Dept: RESEARCH | Facility: HOSPITAL | Age: 67
End: 2022-03-09
Payer: MEDICARE

## 2022-03-09 ENCOUNTER — HOSPITAL ENCOUNTER (OUTPATIENT)
Dept: RADIOLOGY | Facility: HOSPITAL | Age: 67
Discharge: HOME | End: 2022-03-09
Attending: INTERNAL MEDICINE
Payer: MEDICARE

## 2022-03-09 DIAGNOSIS — I77.9 RIGHT-SIDED CAROTID ARTERY DISEASE, UNSPECIFIED TYPE: ICD-10-CM

## 2022-03-09 DIAGNOSIS — Z00.6 EXAMINATION OF PARTICIPANT IN CLINICAL TRIAL: ICD-10-CM

## 2022-03-09 PROCEDURE — 70551 MRI BRAIN STEM W/O DYE: CPT | Mod: 26,,, | Performed by: RADIOLOGY

## 2022-03-09 PROCEDURE — 70551 MRI BRAIN WITHOUT CONTRAST: ICD-10-PCS | Mod: 26,,, | Performed by: RADIOLOGY

## 2022-03-09 PROCEDURE — 70551 MRI BRAIN STEM W/O DYE: CPT | Mod: TC

## 2022-03-09 NOTE — TELEPHONE ENCOUNTER
----- Message from Azucena Lee MD sent at 3/8/2022  5:19 PM CST -----  The blood work is within normal limits.

## 2022-03-09 NOTE — TELEPHONE ENCOUNTER
Study title: Carotid Revascularization and Medical Management for Asymptomatic Carotid Stenosis Trial CREST-2  IRB #: [id number]  IRB approval date: 6/5/2018  Sponsor: [facility]  Site Number: [id number]  Subject ID: [id number]  Date of Encounter: 03/09/2022    Called patient to inform them of medication changes. Per Dr. Lee, patient is to increase their carvedilol to 25mg twice a day. Patient voiced understanding.     Patient had not completed their cognitive assessment call due to other obligations. Patient stated that Cognitive assessment team will call at a later time but will let me know if call needs to be rescheduled.    Patient inquired about results for carotid ultrasound. Will reach out to Dr. Black regarding results.

## 2022-03-09 NOTE — TELEPHONE ENCOUNTER
Study title: Carotid Revascularization and Medical Management for Asymptomatic Carotid Stenosis Trial CREST-2  IRB #: [id number]  IRB approval date: 6/5/2018  Sponsor: [facility]  Site Number: [id number]  Subject ID: [id number]  Date of Encounter: 03/09/2022    Returned patient's call. Patient stated they completed their cognitive assessment call. Voiced understanding. Will input $95 stipend into [employer].    Also informed patient that I reached out to Dr. Black regarding carotid ultrasound results. Patient voiced understanding. Thanked patient for their time and participation.

## 2022-03-09 NOTE — TELEPHONE ENCOUNTER
----- Message from Debora Cano sent at 3/7/2022  1:52 PM CST -----  Regarding: CREST-2 Hahn FINAL CREST-2 visit  Good afternoon Dr. Lee,    I met with Ms. Hahn for their FINAL 48-Month visit for the CREST 2 study. Patient was in good spirits stated that he has been compliant with his medication. Patient still on 325mg aspirin. Since last visit, patient had adjusted doses of their carvedilol and chlorthalidone, per Dr. Lee on 11/3/2021. Patient denied any dizziness/lightheadedness symptoms. No contact information has changed since last visit. This visit consisted of lab work, questionnaires, Modified [place] Scale (mRS), NIH Stroke Scale, BP check, and cognitive phone call.     Lab work was completed.  All Questionnaires were answered. No stroke symptoms were noted.  mRS score of 0. NIH stroke scale sore of 1. On sensory portion, patient felt prick less on their left cheek than right cheek. When pricking both ankle areas, patient did not feel pricks as much but stated they felt the same. However, when patient was to identify which side of body I touched with their eyes closed, patient was able to correctly identify which ankle was touched.  Cognitive phone call was scheduled for 3/9/2022 @ 10:00.     3 sitting and 1 standing BP checks were done using the Omron study device. The readings are as followed:     1st sittin/75; HR: 72  2nd sittin/71; HR: 71  3rd sittin/74; HR: 71  Avg. Sittin/73     Lowest Heart rate: 71     Weight: 67.7 kg    This is Ms. Farias's final visit. She is not required to return for a 30-Day BP check. Please advise of medication changes in relation to BP readings and lab results.    Respectfully,  Debora [phone]

## 2022-03-09 NOTE — TELEPHONE ENCOUNTER
Her blood pressure is above goal per the crest 2 protocol.  Recommend increasing her carvedilol to 25 mg twice daily.  Her labs are within normal limits and LDL is at goal.

## 2022-03-10 ENCOUNTER — TELEPHONE (OUTPATIENT)
Dept: RESEARCH | Facility: HOSPITAL | Age: 67
End: 2022-03-10
Payer: MEDICARE

## 2022-03-10 NOTE — TELEPHONE ENCOUNTER
Study title: Carotid Revascularization and Medical Management for Asymptomatic Carotid Stenosis Trial CREST-2  IRB #: [id number]  IRB approval date: 6/5/2018  Sponsor: [facility]  Site Number: [id number]  Subject ID: [id number]  Date of Encounter: 03/10/2022    Called patient to verify medications. Patient did not answer. Message was left to call back.

## 2022-03-14 ENCOUNTER — TELEPHONE (OUTPATIENT)
Dept: RESEARCH | Facility: HOSPITAL | Age: 67
End: 2022-03-14
Payer: MEDICARE

## 2022-03-14 NOTE — TELEPHONE ENCOUNTER
Study title: Carotid Revascularization and Medical Management for Asymptomatic Carotid Stenosis Trial CREST-2  IRB #: [id number]  IRB approval date: 6/5/2018  Sponsor: [facility]  Site Number: [id number]  Subject ID: [id number]  Date of Encounter: 03/14/2022    Called patient to verify medications for CREST-2. Verified if patient is still taking felodipine and rosuvastatin. Patient stated still doing so. Voiced understanding. Will document accordingly.  _____________________    Patient stated she is planned to receive a carotid stent, and had questions about coverage for the procedure in relation to CREST-2, as they were planning to have a carotid stent during study participation. Will investigate and will get back with patient.

## 2022-03-15 ENCOUNTER — TELEPHONE (OUTPATIENT)
Dept: RESEARCH | Facility: HOSPITAL | Age: 67
End: 2022-03-15
Payer: MEDICARE

## 2022-03-15 NOTE — TELEPHONE ENCOUNTER
Study title: Carotid Revascularization and Medical Management for Asymptomatic Carotid Stenosis Trial CREST-2  IRB #: [id number]  IRB approval date: 6/5/2018  Sponsor: [facility]  Site Number: [id number]  Subject ID: [id number]  Date of Encounter: 03/15/2022    Called patient to inform of update regarding DANNIELLE procedure coverage in relation to CREST-2. Expressed to patient, that since DANNIELLE procedure would be SoC, patient/insurance company would be responsible for coverage. Patient voiced understanding.  _____________________    Also confirmed ongoing AE event of cramps in association with atorvastatin. Per patient, this event stopped when they were switched over to rosuvastatin. Voiced understanding. Will document accordingly.

## 2022-04-05 ENCOUNTER — OFFICE VISIT (OUTPATIENT)
Dept: CARDIOLOGY | Facility: CLINIC | Age: 67
End: 2022-04-05
Payer: MEDICARE

## 2022-04-05 VITALS
WEIGHT: 148.13 LBS | BODY MASS INDEX: 23.25 KG/M2 | HEART RATE: 74 BPM | SYSTOLIC BLOOD PRESSURE: 118 MMHG | DIASTOLIC BLOOD PRESSURE: 58 MMHG | HEIGHT: 67 IN | OXYGEN SATURATION: 99 %

## 2022-04-05 DIAGNOSIS — I65.21 CAROTID STENOSIS, RIGHT: Primary | ICD-10-CM

## 2022-04-05 PROCEDURE — 99999 PR PBB SHADOW E&M-EST. PATIENT-LVL IV: ICD-10-PCS | Mod: PBBFAC,,, | Performed by: INTERNAL MEDICINE

## 2022-04-05 PROCEDURE — 99999 PR PBB SHADOW E&M-EST. PATIENT-LVL IV: CPT | Mod: PBBFAC,,, | Performed by: INTERNAL MEDICINE

## 2022-04-05 PROCEDURE — 99214 OFFICE O/P EST MOD 30 MIN: CPT | Mod: PBBFAC | Performed by: INTERNAL MEDICINE

## 2022-04-05 PROCEDURE — 99214 OFFICE O/P EST MOD 30 MIN: CPT | Mod: S$PBB,,, | Performed by: INTERNAL MEDICINE

## 2022-04-05 PROCEDURE — 99214 PR OFFICE/OUTPT VISIT, EST, LEVL IV, 30-39 MIN: ICD-10-PCS | Mod: S$PBB,,, | Performed by: INTERNAL MEDICINE

## 2022-04-05 NOTE — PROGRESS NOTES
PCP - Shyann Peraza MD  Referring Physician:     Subjective:   Patient ID:  Erin Hahn is a 66 y.o. female with past medical history of:  Right carotid stenosis (CREST II trial)  H/o TIA  HTN  HLD  DM on insulin  CKD 3    Who returns for follow up for R internal carotid stenosis. Patient last seen by Dr. Black in July 2021. She was enrolled in the CREST II trial and randomized to the medical therapy arm but then presented with TIA. She was initially scheduled for stenting in August 2021 but this was canceled due to lack of insurance approval. Patient reports that she has been asymptomatic since that time.  She denies any weakness, numbness or tingling of the extremities.  She is unsure at this she would like to have a carotid stent placed.  She reports that she is scared of having the procedure done.  She did not have any specific reasons for being scared of the procedure and she would like to pray about it before deciding on having it done.    History:     Social History     Tobacco Use    Smoking status: Never Smoker    Smokeless tobacco: Never Used   Substance Use Topics    Alcohol use: No     Alcohol/week: 0.0 standard drinks     Family History   Problem Relation Age of Onset    Diabetes Mother     Hepatitis Mother     No Known Problems Father     No Known Problems Sister     No Known Problems Brother     COPD Maternal Aunt     No Known Problems Maternal Uncle     No Known Problems Paternal Aunt     No Known Problems Paternal Uncle     Diabetes Maternal Grandmother     No Known Problems Maternal Grandfather     No Known Problems Paternal Grandmother     No Known Problems Paternal Grandfather     Anemia Neg Hx     Arrhythmia Neg Hx     Asthma Neg Hx     Clotting disorder Neg Hx     Fainting Neg Hx     Heart attack Neg Hx     Heart disease Neg Hx     Heart failure Neg Hx     Hyperlipidemia Neg Hx     Hypertension Neg Hx     Stroke Neg Hx     Atrial Septal Defect Neg  Hx        Meds:     Review of patient's allergies indicates:   Allergen Reactions    Atorvastatin Other (See Comments)     myalgias       Current Outpatient Medications:     ascorbic acid, vitamin C, (VITAMIN C) 250 MG tablet, Take 250 mg by mouth once daily., Disp: , Rfl:     aspirin (ECOTRIN) 325 MG EC tablet, Take 1 tablet (325 mg total) by mouth once daily., Disp: 30 tablet, Rfl: 11    b complex vitamins capsule, Take 1 capsule by mouth once daily., Disp: , Rfl:     carvediloL (COREG) 12.5 MG tablet, Take 1 tablet (12.5 mg total) by mouth 2 (two) times daily with meals., Disp: 180 tablet, Rfl: 3    chlorthalidone (HYGROTEN) 25 MG Tab, Take 1 tablet (25 mg total) by mouth once daily., Disp: 90 tablet, Rfl: 3    co-enzyme Q-10 30 mg capsule, Take 30 mg by mouth 3 (three) times daily., Disp: , Rfl:     felodipine (PLENDIL) 10 MG 24 hr tablet, Take 1 tablet (10 mg total) by mouth once daily., Disp: 30 tablet, Rfl: 11    INSULIN DETEMIR (LEVEMIR SUBQ), Inject 21 Units into the skin. , Disp: , Rfl:     levocarnitine (L-CARNITINE ORAL), Take by mouth., Disp: , Rfl:     omega-3 fatty acids/fish oil (FISH OIL-OMEGA-3 FATTY ACIDS) 300-1,000 mg capsule, Take by mouth once daily., Disp: , Rfl:     rosuvastatin (CRESTOR) 20 MG tablet, Take 1 tablet (20 mg total) by mouth once daily., Disp: 30 tablet, Rfl: 11    sitagliptan-metformin (JANUMET) 50-1,000 mg per tablet, Take 1 tablet by mouth 2 (two) times daily with meals. , Disp: , Rfl:     vitamin D (VITAMIN D3) 1000 units Tab, Take 1,000 Units by mouth once daily., Disp: , Rfl:         Objective:   There were no vitals taken for this visit.  Physical Exam  Gen: No apparent distress, resting comfortably  HEENT: Pupils equal and reactive to light  Cardio: Regular rate, point of maximal impulse not displaced, no murmur noted, 2+ radial pulses bilaterally, 2+ DP pulses bilaterally  Resp: CTAB, no wheezing  Abd: Soft, non-tender, non-distended  Skin: Warm, dry, no  peripheral edema noted  Neuro: Alert and oriented x3  Psych: Normal mood and affect      Labs:     Lab Results   Component Value Date     03/07/2022    K 5.0 03/07/2022     03/07/2022    CO2 24 03/07/2022    BUN 23 03/07/2022    CREATININE 1.2 03/07/2022    ANIONGAP 10 03/07/2022     Lab Results   Component Value Date    HGBA1C 6.4 (H) 03/07/2022     Lab Results   Component Value Date    BNP 99 07/10/2017    BNP 30 03/14/2017    BNP 26 09/16/2016       Lab Results   Component Value Date    WBC 6.18 07/20/2021    WBC 6.18 07/20/2021    HGB 10.0 (L) 07/20/2021    HGB 10.0 (L) 07/20/2021    HCT 32.6 (L) 07/20/2021    HCT 32.6 (L) 07/20/2021     07/20/2021     07/20/2021    GRAN 4.2 07/20/2021    GRAN 67.5 07/20/2021     Lab Results   Component Value Date    CHOL 77 (L) 03/07/2022    HDL 36 (L) 03/07/2022    LDLCALC 25.2 (L) 03/07/2022    TRIG 79 03/07/2022       Lab Results   Component Value Date     03/07/2022    K 5.0 03/07/2022     03/07/2022    CO2 24 03/07/2022    BUN 23 03/07/2022    CREATININE 1.2 03/07/2022    ANIONGAP 10 03/07/2022     Lab Results   Component Value Date    HGBA1C 6.4 (H) 03/07/2022     Lab Results   Component Value Date    BNP 99 07/10/2017    BNP 30 03/14/2017    BNP 26 09/16/2016    Lab Results   Component Value Date    WBC 6.18 07/20/2021    WBC 6.18 07/20/2021    HGB 10.0 (L) 07/20/2021    HGB 10.0 (L) 07/20/2021    HCT 32.6 (L) 07/20/2021    HCT 32.6 (L) 07/20/2021     07/20/2021     07/20/2021    GRAN 4.2 07/20/2021    GRAN 67.5 07/20/2021     Lab Results   Component Value Date    CHOL 77 (L) 03/07/2022    HDL 36 (L) 03/07/2022    LDLCALC 25.2 (L) 03/07/2022    TRIG 79 03/07/2022                Cardiovascular Imaging:     Echo:   · The left ventricle is normal in size with normal systolic function.  · The estimated ejection fraction is 65%.  · Grade I left ventricular diastolic dysfunction.  · Normal right ventricular size with normal  right ventricular systolic function.  · Mild mitral regurgitation.  · Mild tricuspid regurgitation.  · The estimated PA systolic pressure is 24 mmHg.  · Normal central venous pressure (3 mmHg).    Stress test:     LHC:    Carotid US 2/18:  CONCLUSIONS   There is 80 - 99% right Internal Carotid stenosis.   There is 0 - 19% left Internal Carotid stenosis.      U/S 3/7/22:  · There is greater than 70% right internal carotid artery stenosis.  · There is less than 50% left internal carotid artery stenosis.  · There is antegrade flow in the vertebral arteries bilaterally.  · Compared to images from study dated 2/3/2021, no significant change.        Assessment & Plan:     1. Carotid Stenosis  -Carotid U/S as above with 70% right internal carotid stenosis and 0-20% on the left  -Patient enrolled in the CREST 2 study  -Continue  mg qd and rosuvastatin 20 mg qd  -Patient reports that she would like to think about it before consenting for right carotid stent.  She will let Dr. Black's clinic know about her decision.  If she were to have future TIAs or becomes more symptomatic we would recommend that she have this done sooner.      Patient to let clinic know about decision. RTC 3 months    Signed:  González Collier MD  [facility] PGY-4    Staff attestation to follow.

## 2022-08-19 ENCOUNTER — OFFICE VISIT (OUTPATIENT)
Dept: CARDIOLOGY | Facility: CLINIC | Age: 67
End: 2022-08-19
Payer: MEDICARE

## 2022-08-19 VITALS
HEIGHT: 66 IN | OXYGEN SATURATION: 98 % | BODY MASS INDEX: 23.59 KG/M2 | DIASTOLIC BLOOD PRESSURE: 61 MMHG | WEIGHT: 146.81 LBS | SYSTOLIC BLOOD PRESSURE: 119 MMHG | HEART RATE: 69 BPM

## 2022-08-19 DIAGNOSIS — N18.31 STAGE 3A CHRONIC KIDNEY DISEASE: ICD-10-CM

## 2022-08-19 DIAGNOSIS — I10 ESSENTIAL HYPERTENSION: ICD-10-CM

## 2022-08-19 DIAGNOSIS — Z86.73 HISTORY OF TRANSIENT ISCHEMIC ATTACK (TIA): ICD-10-CM

## 2022-08-19 DIAGNOSIS — Z79.4 TYPE 2 DIABETES MELLITUS WITH OTHER SPECIFIED COMPLICATION, WITH LONG-TERM CURRENT USE OF INSULIN: ICD-10-CM

## 2022-08-19 DIAGNOSIS — Z01.810 PREOP CARDIOVASCULAR EXAM: Primary | ICD-10-CM

## 2022-08-19 DIAGNOSIS — I65.21 CAROTID STENOSIS, RIGHT: ICD-10-CM

## 2022-08-19 DIAGNOSIS — E78.2 MIXED HYPERLIPIDEMIA: ICD-10-CM

## 2022-08-19 DIAGNOSIS — E11.69 TYPE 2 DIABETES MELLITUS WITH OTHER SPECIFIED COMPLICATION, WITH LONG-TERM CURRENT USE OF INSULIN: ICD-10-CM

## 2022-08-19 PROBLEM — E87.5 HYPERKALEMIA: Status: RESOLVED | Noted: 2018-03-15 | Resolved: 2022-08-19

## 2022-08-19 PROCEDURE — 99214 PR OFFICE/OUTPT VISIT, EST, LEVL IV, 30-39 MIN: ICD-10-PCS | Mod: S$PBB,,, | Performed by: NURSE PRACTITIONER

## 2022-08-19 PROCEDURE — 99214 OFFICE O/P EST MOD 30 MIN: CPT | Mod: S$PBB,,, | Performed by: NURSE PRACTITIONER

## 2022-08-19 PROCEDURE — 99999 PR PBB SHADOW E&M-EST. PATIENT-LVL IV: ICD-10-PCS | Mod: PBBFAC,,, | Performed by: NURSE PRACTITIONER

## 2022-08-19 PROCEDURE — 93010 EKG 12-LEAD: ICD-10-PCS | Mod: S$PBB,,, | Performed by: INTERNAL MEDICINE

## 2022-08-19 PROCEDURE — 93005 ELECTROCARDIOGRAM TRACING: CPT | Mod: PBBFAC | Performed by: INTERNAL MEDICINE

## 2022-08-19 PROCEDURE — 93010 ELECTROCARDIOGRAM REPORT: CPT | Mod: S$PBB,,, | Performed by: INTERNAL MEDICINE

## 2022-08-19 PROCEDURE — 99214 OFFICE O/P EST MOD 30 MIN: CPT | Mod: PBBFAC | Performed by: NURSE PRACTITIONER

## 2022-08-19 PROCEDURE — 99999 PR PBB SHADOW E&M-EST. PATIENT-LVL IV: CPT | Mod: PBBFAC,,, | Performed by: NURSE PRACTITIONER

## 2022-08-19 RX ORDER — MAGNESIUM 250 MG
1500 TABLET ORAL
COMMUNITY

## 2022-08-19 NOTE — PROGRESS NOTES
Ms. Hahn is a patient of Dr. Lee and was last seen in [facility] Visit 4/5/22.      Subjective:   Patient ID:  Erin Hahn is a 66 y.o. female who presents for follow-up of Pre-op Exam (Eye lid surgery)    Medical Hx:  Right carotid stenosis (CREST II trial)  H/o TIA  HTN  HLD  DM on insulin  CKD 3    HPI:  Ms. Hahn is in clinic today for preoperative risk assessment prior to ptosis corrective surgery.  She is in the crest II trial and had been randomized to the medical Rx option but did have a TIA subsequently.  She would like to not have a stent but would like to have vascular surgery, CEA, instead.  She finished the study.  Patient denies chest pain with exertion or at rest, palpitations, SOB, FLOYD, dizziness, syncope, edema, orthopnea, PND, or claudication.  Reports being able to do heavy housework, vacuuming or changing the linen, without sx.  Home BP runs 120-130s.       Revised Cardiac Risk Index for Pre-Operative Risk Yes +1 No 0   1. High-risk surgery: Intraperitoneal; intrathoracic; suprainguinal vascular    0 (ptosis correction)   2. History of ischemic heart disease:  History of myocardial infarction (MI); history of positive exercise test; current chest paint considered due to myocardial ischemia; use of nitrate therapy or ECG with pathological Q waves    0   3. History of congestive heart failure:  Pulmonary edema, bilateral rales or S3 gallop; paroxysmal nocturnal dyspnea; chest x-ray (CXR) showing pulmonary vascular redistribution    0   4. History of cerebrovascular disease: Prior transient ischemic attack (TIA) or stroke   1    5.  Pre-operative treatment with insulin 1    6.  Creatinine >2  0 (Cr 1.2)     Total Risk for tameka-operative major cardiac event, 10.1%      Review of Systems   Constitutional: Negative for decreased appetite, diaphoresis, malaise/fatigue, weight gain and weight loss.   Eyes: Negative for visual disturbance.   Cardiovascular: Negative for chest pain,  claudication, dyspnea on exertion, irregular heartbeat, leg swelling, near-syncope, orthopnea, palpitations, paroxysmal nocturnal dyspnea and syncope.        Denies chest pressure   Respiratory: Negative for cough, hemoptysis, shortness of breath, sleep disturbances due to breathing and snoring.    Endocrine: Negative for cold intolerance and heat intolerance.   Hematologic/Lymphatic: Negative for bleeding problem. Does not bruise/bleed easily.   Musculoskeletal: Negative for myalgias.   Gastrointestinal: Negative for bloating, abdominal pain, anorexia, change in bowel habit, constipation, diarrhea, nausea and vomiting.   Neurological: Negative for difficulty with concentration, disturbances in coordination, excessive daytime sleepiness, dizziness, headaches, light-headedness, loss of balance, numbness and weakness.   Psychiatric/Behavioral: The patient does not have insomnia.        Allergies and current medications updated and reviewed:  Review of patient's allergies indicates:   Allergen Reactions    Atorvastatin Other (See Comments)     myalgias  myalgias     Current Outpatient Medications   Medication Sig    ascorbic acid, vitamin C, (VITAMIN C) 250 MG tablet Take 1,000 mg by mouth once daily.    aspirin (ECOTRIN) 325 MG EC tablet Take 1 tablet (325 mg total) by mouth once daily.    b complex vitamins capsule Take 1 capsule by mouth once daily.    carvediloL (COREG) 12.5 MG tablet Take 1 tablet (12.5 mg total) by mouth 2 (two) times daily with meals.    chlorthalidone (HYGROTEN) 25 MG Tab Take 1 tablet (25 mg total) by mouth once daily.    co-enzyme Q-10 30 mg capsule Take 30 mg by mouth 3 (three) times daily.    INSULIN DETEMIR (LEVEMIR SUBQ) Inject 17 Units into the skin.    levocarnitine (L-CARNITINE ORAL) Take by mouth.    omega-3 fatty acids/fish oil (FISH OIL-OMEGA-3 FATTY ACIDS) 300-1,000 mg capsule Take by mouth once daily.    sitagliptan-metformin (JANUMET) 50-1,000 mg per tablet Take 1  "tablet by mouth 2 (two) times daily with meals.     vitamin D (VITAMIN D3) 1000 units Tab Take 1,000 Units by mouth once daily.    felodipine (PLENDIL) 10 MG 24 hr tablet Take 1 tablet (10 mg total) by mouth once daily.    magnesium 250 mg Tab Take 1,500 mg by mouth.    rosuvastatin (CRESTOR) 20 MG tablet Take 1 tablet (20 mg total) by mouth once daily.     No current facility-administered medications for this visit.       Objective:     Right Arm BP - Sittin/60 (22 1059)  Left Arm BP - Sittin/61 (22 1059)    /61 (BP Location: Left arm, Patient Position: Sitting)   Pulse 69   Ht 5' 6" (1.676 m)   Wt 66.6 kg (146 lb 13.2 oz)   SpO2 98%   BMI 23.70 kg/m²       Physical Exam  Vitals and nursing note reviewed.   Constitutional:       General: She is not in acute distress.     Appearance: Normal appearance. She is well-developed. She is not diaphoretic.   HENT:      Head: Normocephalic and atraumatic.   Eyes:      General: Lids are normal. No scleral icterus.     Conjunctiva/sclera: Conjunctivae normal.   Neck:      Vascular: Normal carotid pulses. No carotid bruit, hepatojugular reflux or JVD.   Cardiovascular:      Rate and Rhythm: Normal rate and regular rhythm.      Chest Wall: PMI is not displaced.      Pulses: Intact distal pulses.           Carotid pulses are 2+ on the right side and 2+ on the left side.       Radial pulses are 2+ on the right side and 2+ on the left side.        Dorsalis pedis pulses are 2+ on the right side and 2+ on the left side.        Posterior tibial pulses are 2+ on the right side and 2+ on the left side.      Heart sounds: S1 normal and S2 normal. No murmur heard.    No friction rub. No gallop.   Pulmonary:      Effort: Pulmonary effort is normal. No respiratory distress.      Breath sounds: Normal breath sounds. No decreased breath sounds, wheezing, rhonchi or rales.   Chest:      Chest wall: No tenderness.   Abdominal:      General: Bowel sounds " are normal. There is no distension or abdominal bruit.      Palpations: Abdomen is soft. There is no fluid wave or pulsatile mass.      Tenderness: There is no abdominal tenderness.   Musculoskeletal:      Cervical back: Neck supple.   Skin:     General: Skin is warm and dry.      Findings: No rash.      Nails: There is no clubbing.   Neurological:      Mental Status: She is alert and oriented to person, place, and time.      Gait: Gait normal.   Psychiatric:         Speech: Speech normal.         Behavior: Behavior normal.         Thought Content: Thought content normal.         Judgment: Judgment normal.         Chemistry        Component Value Date/Time     03/07/2022 0848    K 5.0 03/07/2022 0848     03/07/2022 0848    CO2 24 03/07/2022 0848    BUN 23 03/07/2022 0848    CREATININE 1.2 03/07/2022 0848    GLU 78 03/07/2022 0848        Component Value Date/Time    CALCIUM 9.8 03/07/2022 0848    ALKPHOS 73 03/07/2022 0848    AST 22 03/07/2022 0848    ALT 23 03/07/2022 0848    BILITOT 0.4 03/07/2022 0848    ESTGFRAFRICA 54.8 (A) 03/07/2022 0848    EGFRNONAA 47.5 (A) 03/07/2022 0848        Lab Results   Component Value Date    HGBA1C 6.4 (H) 03/07/2022     Recent Labs   Lab 07/03/21  1056 07/20/21  1101 03/07/22  0848   WBC  --  6.18  6.18  --    Hemoglobin  --  10.0 L  10.0 L  --    Hematocrit  --  32.6 L  32.6 L  --    MCV  --  88  88  --    Platelets  --  258  258  --    TSH 1.061  --   --    Cholesterol  --  114 L 77 L   HDL  --  35 L 36 L   LDL Cholesterol  --  58.2 L 25.2 L   Triglycerides  --  104 79   HDL/Cholesterol Ratio  --  30.7 46.8       Recent Labs   Lab 07/02/21  1002 07/03/21  0521 07/20/21  1101   INR 1.0 1.0 1.0        Test(s) Reviewed  I have reviewed the following in detail:  [] Stress test   [] Angiography   [x] Echocardiogram   [x] Labs   [] Other:         Assessment/Plan:   1. Carotid stenosis, right  Expressed interest in CEA and not PCI.  She would like to d/w Dr. Lee  further.     2. History of transient ischemic attack (TIA)      3. Mixed hyperlipidemia  LDL at goal <70. Stable. No changes      - Lipid Panel; Future  - Comprehensive Metabolic Panel; Future  - Magnesium; Future  - CBC Without Differential; Future    4. Essential hypertension  BP at goal <130/80. Stable. Continue current regimen.      5. Stage 3a chronic kidney disease  Mild. Stable. Monitor.     6. Type 2 diabetes mellitus with other specified complication, with long-term current use of insulin  Lab Results   Component Value Date    HGBA1C 6.4 (H) 03/07/2022       - Hemoglobin A1C; Future    7. Preop cardiovascular exam    - EKG 12-lead  - Lipid Panel; Future  - Comprehensive Metabolic Panel; Future  - Magnesium; Future  - CBC Without Differential; Future  - Hemoglobin A1C; Future    Ms. Hahn is at moderate risk, 10.1%, for a major cardiac event during the perioperative period. ECG today shows NSR with non-specific t wave changes that are unchanged from a year ago.     Pt has no active cardiac condition (ACS/USA, decompenstated CHF, significant arrhythmias or severe valvular disease) and can easily achieve 4 METS.  Pt does not require further cardiac evaluation prior to undergoing ptosis repair surgery.  Pt should remain on beta-blockers throughout the entire tameka-procedure time period.  Prefer aspirin therapy not be held but is the surgery can not be done with aspirin on board, then it can be held for 5 days prior to the surgery and should be restarted as soon as safely possible.  The remaining cardiac meds can be held as needed but should also be restarted after the procedure. These recommendations follow the most current Guideline on Perioperative Cardiovascular Evaluation and Management of Patients Undergoing Noncardiac Surgery released by the ACC/AHA. (JACC 2014.07.944).         A copy of this note will be forwarded to Dr. Lee, Dr. Peraza, and Dr. Chris Razo (surgeon).     Follow up in about 6 months  (around 2/19/2023).

## 2022-08-23 ENCOUNTER — LAB VISIT (OUTPATIENT)
Dept: LAB | Facility: HOSPITAL | Age: 67
End: 2022-08-23
Attending: INTERNAL MEDICINE
Payer: MEDICARE

## 2022-08-23 DIAGNOSIS — E11.69 TYPE 2 DIABETES MELLITUS WITH OTHER SPECIFIED COMPLICATION, WITH LONG-TERM CURRENT USE OF INSULIN: ICD-10-CM

## 2022-08-23 DIAGNOSIS — Z01.810 PREOP CARDIOVASCULAR EXAM: ICD-10-CM

## 2022-08-23 DIAGNOSIS — Z79.4 TYPE 2 DIABETES MELLITUS WITH OTHER SPECIFIED COMPLICATION, WITH LONG-TERM CURRENT USE OF INSULIN: ICD-10-CM

## 2022-08-23 DIAGNOSIS — E78.2 MIXED HYPERLIPIDEMIA: ICD-10-CM

## 2022-08-23 LAB
ALBUMIN SERPL BCP-MCNC: 3.8 G/DL (ref 3.5–5.2)
ALP SERPL-CCNC: 96 U/L (ref 55–135)
ALT SERPL W/O P-5'-P-CCNC: 17 U/L (ref 10–44)
ANION GAP SERPL CALC-SCNC: 6 MMOL/L (ref 8–16)
AST SERPL-CCNC: 16 U/L (ref 10–40)
BILIRUB SERPL-MCNC: 0.4 MG/DL (ref 0.1–1)
BUN SERPL-MCNC: 25 MG/DL (ref 8–23)
CALCIUM SERPL-MCNC: 9.6 MG/DL (ref 8.7–10.5)
CHLORIDE SERPL-SCNC: 111 MMOL/L (ref 95–110)
CHOLEST SERPL-MCNC: 88 MG/DL (ref 120–199)
CHOLEST/HDLC SERPL: 2.2 {RATIO} (ref 2–5)
CO2 SERPL-SCNC: 21 MMOL/L (ref 23–29)
CREAT SERPL-MCNC: 1.2 MG/DL (ref 0.5–1.4)
ERYTHROCYTE [DISTWIDTH] IN BLOOD BY AUTOMATED COUNT: 13.7 % (ref 11.5–14.5)
EST. GFR  (NO RACE VARIABLE): 50.2 ML/MIN/1.73 M^2
ESTIMATED AVG GLUCOSE: 146 MG/DL (ref 68–131)
GLUCOSE SERPL-MCNC: 123 MG/DL (ref 70–110)
HBA1C MFR BLD: 6.7 % (ref 4–5.6)
HCT VFR BLD AUTO: 29.4 % (ref 37–48.5)
HDLC SERPL-MCNC: 40 MG/DL (ref 40–75)
HDLC SERPL: 45.5 % (ref 20–50)
HGB BLD-MCNC: 9.1 G/DL (ref 12–16)
LDLC SERPL CALC-MCNC: 35.2 MG/DL (ref 63–159)
MAGNESIUM SERPL-MCNC: 1.5 MG/DL (ref 1.6–2.6)
MCH RBC QN AUTO: 27.1 PG (ref 27–31)
MCHC RBC AUTO-ENTMCNC: 31 G/DL (ref 32–36)
MCV RBC AUTO: 88 FL (ref 82–98)
NONHDLC SERPL-MCNC: 48 MG/DL
PLATELET # BLD AUTO: 225 K/UL (ref 150–450)
PMV BLD AUTO: 10.9 FL (ref 9.2–12.9)
POTASSIUM SERPL-SCNC: 4.5 MMOL/L (ref 3.5–5.1)
PROT SERPL-MCNC: 7.2 G/DL (ref 6–8.4)
RBC # BLD AUTO: 3.36 M/UL (ref 4–5.4)
SODIUM SERPL-SCNC: 138 MMOL/L (ref 136–145)
TRIGL SERPL-MCNC: 64 MG/DL (ref 30–150)
WBC # BLD AUTO: 5.6 K/UL (ref 3.9–12.7)

## 2022-08-23 PROCEDURE — 85027 COMPLETE CBC AUTOMATED: CPT | Performed by: NURSE PRACTITIONER

## 2022-08-23 PROCEDURE — 83036 HEMOGLOBIN GLYCOSYLATED A1C: CPT | Performed by: NURSE PRACTITIONER

## 2022-08-23 PROCEDURE — 80053 COMPREHEN METABOLIC PANEL: CPT | Performed by: NURSE PRACTITIONER

## 2022-08-23 PROCEDURE — 36415 COLL VENOUS BLD VENIPUNCTURE: CPT | Mod: PO | Performed by: NURSE PRACTITIONER

## 2022-08-23 PROCEDURE — 80061 LIPID PANEL: CPT | Performed by: NURSE PRACTITIONER

## 2022-08-23 PROCEDURE — 83735 ASSAY OF MAGNESIUM: CPT | Performed by: NURSE PRACTITIONER

## 2022-08-31 ENCOUNTER — TELEPHONE (OUTPATIENT)
Dept: CARDIOLOGY | Facility: CLINIC | Age: 67
End: 2022-08-31
Payer: MEDICARE

## 2023-01-11 NOTE — TELEPHONE ENCOUNTER
"----- Message from Debora Cano sent at 2019  1:01 PM CST -----  Good afternoon Dr. Lee,    I met with Ms. Hahn for her CREST 2 BP check. Ms. Hahn did mention that she did not take her carvedilol this morning, because she did not eat before her visit and the medication must be taken with food. Her BP's were taken on the right arm, using the [employer] study device. BP are as followed:     1st sittin/89  2nd sittin/90  3rd sittin/93  Mean: 160/90     Heart rate: 75  Standing BP: 124/72     During the visit, Ms. Hahn mentioned that she is considering dropping out of the CREST 2 study due to worries of "having her BP medication constantly increasing." Furthermore, she was frustrated with the readings and was vocal about opposing an increase in her carvedilol dose. She mentioned that she takes readings at home "in her bathroom, by sitting on her bathroom sink and her feet not crossed and flat on the ground, and that the readings were never this high." Did another BP check and the reading was 150/87.     Would it be possible to have her come back next week and have another BP check, and make sure that she does take her carvedilol?    Thanks again for your time,  Debora      " Patient with COPD diagnosis , now requiring 1 liter nasal cannula sat room air at rest 83%,  on 1lnc sat 92 %.

## 2023-02-01 NOTE — PROGRESS NOTES
Subjective:   Patient ID:  Erin Hahn is a 67 y.o. female who presents for evaluation of Establish Care and Follow-up    PROBLEM LIST:  Right carotid stenosis (70% DOLORES)  H/o TIA  HTN  HLD  DM on insulin  CKD 3    HPI  11/3/21:  She was admitted in July with a possible TIA.  She was having transient left-sided weakness as well as dizziness.  A CTA of her head and neck was performed which showed a 50% stenosis in the right internal carotid artery.  She was started on Plavix for 21 days and discharged home.  She has not had any recurrence of her symptoms.  She was seen by Dr. Black in interventional clinic and was scheduled for a carotid angiogram and possible right carotid artery stent, however this has not yet occurred.  She was having some positional orthostasis, and we had increased her chlorthalidone from 50 mg to 25 mg daily which improved her symptoms.  She also states she has only been taking carvedilol 25 mg once a day at 4:00 p.m. Erin Hahn denies any chest pain, shortness of breath, PND, orthopnea, palpitations, leg edema, or syncope.    Interval history:Doing well since her last visit. Decided against right carotid stenting last year. Reports occasional tingling in the left side of her face. Had a recent echo at  with normal LVEF. Carotid US with severe DOLORES stenosis. Erin Hahn denies any chest pain, shortness of breath, PND, orthopnea, palpitations, leg edema, or syncope. Checking BP at home, running 117/70's. Not exercising on a regular basis. Completed CREST II study.      ECG 9-AUG-2022 12:30:31: Personally reviewed by me shows NSR with non-specific ST abnormality    Echo 7/21:  Summary    The left ventricle is normal in size with normal systolic function.  The estimated ejection fraction is 65%.  Grade I left ventricular diastolic dysfunction.  Normal right ventricular size with normal right ventricular systolic function.  Mild mitral regurgitation.  Mild tricuspid  regurgitation.  The estimated PA systolic pressure is 24 mmHg.  Normal central venous pressure (3 mmHg).    Carotid US 3/22:  Conclusion    There is greater than 70% right internal carotid artery stenosis.  There is less than 50% left internal carotid artery stenosis.  There is antegrade flow in the vertebral arteries bilaterally.  Compared to images from study dated 2/3/2021, no significant change.    CTA 7/21:  Impression:     1. CT head: No acute intracranial abnormality.  2. CTA head: No high-grade stenosis or occlusion or intracranial aneurysm.  3. CTA neck: Atherosclerosis of the right carotid bifurcation resulting in approximately 50% narrowing of the right proximal ICA.  4. Multiple bilateral indeterminate thyroid hypodensities measuring up to 2.3 cm on the right and up to 1.4 cm on the left which can be further evaluated with nonemergent thyroid ultrasound.    MRI 7/21:    Impression:     No acute intracranial abnormality.    Past Medical History:   Diagnosis Date    Carotid artery bruit     Diabetes mellitus     High cholesterol     Hypertension     Stage 3a chronic kidney disease 11/3/2021    Stroke 07/2021    TIA       Past Surgical History:   Procedure Laterality Date    EYE SURGERY Left 03/2022    TONSILLECTOMY         Social History     Socioeconomic History    Marital status:    Tobacco Use    Smoking status: Never    Smokeless tobacco: Never   Substance and Sexual Activity    Alcohol use: No     Alcohol/week: 0.0 standard drinks    Drug use: No    Sexual activity: Not Currently   Social History Narrative    ** Merged History Encounter **            Family History   Problem Relation Age of Onset    Diabetes Mother     Hepatitis Mother     No Known Problems Father     No Known Problems Sister     No Known Problems Brother     COPD Maternal Aunt     No Known Problems Maternal Uncle     No Known Problems Paternal Aunt     No Known Problems Paternal Uncle     Diabetes Maternal Grandmother     No  Known Problems Maternal Grandfather     No Known Problems Paternal Grandmother     No Known Problems Paternal Grandfather     Anemia Neg Hx     Arrhythmia Neg Hx     Asthma Neg Hx     Clotting disorder Neg Hx     Fainting Neg Hx     Heart attack Neg Hx     Heart disease Neg Hx     Heart failure Neg Hx     Hyperlipidemia Neg Hx     Hypertension Neg Hx     Stroke Neg Hx     Atrial Septal Defect Neg Hx        Patient's Medications   New Prescriptions    No medications on file   Previous Medications    ASCORBIC ACID, VITAMIN C, (VITAMIN C) 250 MG TABLET    Take 1,000 mg by mouth once daily.    ASPIRIN (ECOTRIN) 325 MG EC TABLET    Take 1 tablet (325 mg total) by mouth once daily.    B COMPLEX VITAMINS CAPSULE    Take 1 capsule by mouth once daily.    CARVEDILOL (COREG) 12.5 MG TABLET    Take 1 tablet (12.5 mg total) by mouth 2 (two) times daily with meals.    CO-ENZYME Q-10 30 MG CAPSULE    Take 30 mg by mouth 3 (three) times daily.    FELODIPINE (PLENDIL) 10 MG 24 HR TABLET    Take 1 tablet (10 mg total) by mouth once daily.    INSULIN DETEMIR (LEVEMIR SUBQ)    Inject 17 Units into the skin.    LEVOCARNITINE (L-CARNITINE ORAL)    Take by mouth.    MAGNESIUM 250 MG TAB    Take 1,500 mg by mouth.    OMEGA-3 FATTY ACIDS/FISH OIL (FISH OIL-OMEGA-3 FATTY ACIDS) 300-1,000 MG CAPSULE    Take by mouth once daily.    ROSUVASTATIN (CRESTOR) 20 MG TABLET    Take 1 tablet (20 mg total) by mouth once daily.    SITAGLIPTAN-METFORMIN (JANUMET) 50-1,000 MG PER TABLET    Take 1 tablet by mouth 2 (two) times daily with meals.     VITAMIN D (VITAMIN D3) 1000 UNITS TAB    Take 1,000 Units by mouth once daily.   Modified Medications    Modified Medication Previous Medication    CHLORTHALIDONE (HYGROTEN) 25 MG TAB chlorthalidone (HYGROTEN) 25 MG Tab       Take 1 tablet (25 mg total) by mouth once daily.    Take 1 tablet (25 mg total) by mouth once daily.   Discontinued Medications    No medications on file       Review of Systems  "  Constitutional: Negative for malaise/fatigue and weight gain.   HENT:  Negative for hearing loss.    Eyes:  Negative for visual disturbance.   Cardiovascular:  Negative for chest pain, claudication, dyspnea on exertion, leg swelling, near-syncope, orthopnea, palpitations, paroxysmal nocturnal dyspnea and syncope.   Respiratory:  Negative for cough, shortness of breath, sleep disturbances due to breathing, snoring and wheezing.    Endocrine: Negative for cold intolerance, heat intolerance, polydipsia, polyphagia and polyuria.   Hematologic/Lymphatic: Negative for bleeding problem. Does not bruise/bleed easily.   Skin:  Negative for rash and suspicious lesions.   Musculoskeletal:  Negative for arthritis, falls, joint pain, muscle weakness and myalgias.   Gastrointestinal:  Negative for abdominal pain, change in bowel habit, constipation, diarrhea, heartburn, hematochezia, melena and nausea.   Genitourinary:  Negative for hematuria and nocturia.   Neurological:  Negative for excessive daytime sleepiness, dizziness, headaches, light-headedness, loss of balance and weakness.   Psychiatric/Behavioral:  Negative for depression. The patient is not nervous/anxious.    Allergic/Immunologic: Negative for environmental allergies.     /63 (BP Location: Left arm, Patient Position: Sitting, BP Method: Medium (Automatic))   Pulse 71   Ht 5' 6" (1.676 m)   Wt 66.8 kg (147 lb 4.3 oz)   SpO2 97%   BMI 23.77 kg/m²     Objective:   Physical Exam  Constitutional:       Appearance: She is well-developed.      Comments:      HENT:      Head: Normocephalic and atraumatic.   Eyes:      General: No scleral icterus.     Conjunctiva/sclera: Conjunctivae normal.      Pupils: Pupils are equal, round, and reactive to light.   Neck:      Thyroid: No thyromegaly.      Vascular: No hepatojugular reflux or JVD.      Trachea: No tracheal deviation.   Cardiovascular:      Rate and Rhythm: Normal rate and regular rhythm.      Chest Wall: PMI " is not displaced.      Pulses: Intact distal pulses.           Carotid pulses are 2+ on the right side with bruit and 2+ on the left side.       Radial pulses are 2+ on the right side and 2+ on the left side.        Dorsalis pedis pulses are 2+ on the right side and 1+ on the left side.        Posterior tibial pulses are 2+ on the right side and 2+ on the left side.      Heart sounds: Normal heart sounds.   Pulmonary:      Effort: Pulmonary effort is normal.      Breath sounds: Normal breath sounds.   Abdominal:      General: Bowel sounds are normal. There is no distension.      Palpations: Abdomen is soft. There is no mass.      Tenderness: There is no abdominal tenderness.   Musculoskeletal:         General: No tenderness.      Cervical back: Normal range of motion and neck supple.   Lymphadenopathy:      Cervical: No cervical adenopathy.   Skin:     General: Skin is warm and dry.      Findings: No erythema or rash.      Nails: There is no clubbing.   Neurological:      Mental Status: She is alert and oriented to person, place, and time.   Psychiatric:         Speech: Speech normal.         Behavior: Behavior normal.       Lab Results   Component Value Date     08/23/2022    K 4.5 08/23/2022     (H) 08/23/2022    CO2 21 (L) 08/23/2022    BUN 25 (H) 08/23/2022    CREATININE 1.2 08/23/2022     (H) 08/23/2022    HGBA1C 6.7 (H) 08/23/2022    MG 1.5 (L) 08/23/2022    AST 16 08/23/2022    ALT 17 08/23/2022    ALBUMIN 3.8 08/23/2022    PROT 7.2 08/23/2022    BILITOT 0.4 08/23/2022    WBC 5.60 08/23/2022    HGB 9.1 (L) 08/23/2022    HCT 29.4 (L) 08/23/2022    MCV 88 08/23/2022     08/23/2022    INR 1.0 07/20/2021    TSH 1.061 07/03/2021    CHOL 88 (L) 08/23/2022    HDL 40 08/23/2022    LDLCALC 35.2 (L) 08/23/2022    TRIG 64 08/23/2022    BNP 99 07/10/2017       Assessment:     1. Essential hypertension :  Blood pressure is at goal. I have made no changes. Continue current regimen.   2. Carotid  stenosis, right : Declined carotid stent. She would prefer CEA. Will refer her to Vascular Surgery for evaluation.   3. TIA (transient ischemic attack) :  She completed 21 days of Plavix therapy and is now on single anti-platelet therapy with aspirin 325 mg. Following a heart health diet such as the Mediterranean Diet is recommended in addition to 150 minutes a week of moderate intensity exercise to lower cholesterol, maintain a healthy body weight, and improve overall cardiovascular health. OK to decrease asa to 81 mg daily.   4. Mixed hyperlipidemia : Lipids are at goal. Continue statin therapy.   5. Type 2 diabetes mellitus with other specified complication, with long-term current use of insulin : On Janumet. Managed by PCP.           8. Stage 3a chronic kidney disease        Plan:     Erin was seen today for establish care and follow-up.    Diagnoses and all orders for this visit:    History of transient ischemic attack (TIA)    Carotid stenosis, right  -     Ambulatory referral/consult to Vascular Surgery; Future    Essential hypertension  -     chlorthalidone (HYGROTEN) 25 MG Tab; Take 1 tablet (25 mg total) by mouth once daily.  -     Basic Metabolic Panel; Future  -     CBC Auto Differential; Future  -     Magnesium; Future    Mixed hyperlipidemia    Stage 3a chronic kidney disease    Type 2 diabetes mellitus with other specified complication, with long-term current use of insulin    Hyperkalemia  -     chlorthalidone (HYGROTEN) 25 MG Tab; Take 1 tablet (25 mg total) by mouth once daily.        Thank you for allowing me to participate in this patient's care. Please do not hesitate to contact me with any questions or concerns.

## 2023-02-02 ENCOUNTER — LAB VISIT (OUTPATIENT)
Dept: LAB | Facility: HOSPITAL | Age: 68
End: 2023-02-02
Attending: INTERNAL MEDICINE
Payer: MEDICARE

## 2023-02-02 ENCOUNTER — OFFICE VISIT (OUTPATIENT)
Dept: CARDIOLOGY | Facility: CLINIC | Age: 68
End: 2023-02-02
Payer: MEDICARE

## 2023-02-02 VITALS
DIASTOLIC BLOOD PRESSURE: 63 MMHG | BODY MASS INDEX: 23.66 KG/M2 | HEIGHT: 66 IN | SYSTOLIC BLOOD PRESSURE: 137 MMHG | WEIGHT: 147.25 LBS | OXYGEN SATURATION: 97 % | HEART RATE: 71 BPM

## 2023-02-02 DIAGNOSIS — I65.21 CAROTID STENOSIS, RIGHT: ICD-10-CM

## 2023-02-02 DIAGNOSIS — Z79.4 TYPE 2 DIABETES MELLITUS WITH OTHER SPECIFIED COMPLICATION, WITH LONG-TERM CURRENT USE OF INSULIN: ICD-10-CM

## 2023-02-02 DIAGNOSIS — E87.5 HYPERKALEMIA: ICD-10-CM

## 2023-02-02 DIAGNOSIS — Z86.73 HISTORY OF TRANSIENT ISCHEMIC ATTACK (TIA): Primary | ICD-10-CM

## 2023-02-02 DIAGNOSIS — E78.2 MIXED HYPERLIPIDEMIA: ICD-10-CM

## 2023-02-02 DIAGNOSIS — N18.31 STAGE 3A CHRONIC KIDNEY DISEASE: ICD-10-CM

## 2023-02-02 DIAGNOSIS — I10 ESSENTIAL HYPERTENSION: ICD-10-CM

## 2023-02-02 DIAGNOSIS — E11.69 TYPE 2 DIABETES MELLITUS WITH OTHER SPECIFIED COMPLICATION, WITH LONG-TERM CURRENT USE OF INSULIN: ICD-10-CM

## 2023-02-02 LAB
ANION GAP SERPL CALC-SCNC: 8 MMOL/L (ref 8–16)
BASOPHILS # BLD AUTO: 0.03 K/UL (ref 0–0.2)
BASOPHILS NFR BLD: 0.5 % (ref 0–1.9)
BUN SERPL-MCNC: 26 MG/DL (ref 8–23)
CALCIUM SERPL-MCNC: 9.8 MG/DL (ref 8.7–10.5)
CHLORIDE SERPL-SCNC: 110 MMOL/L (ref 95–110)
CO2 SERPL-SCNC: 21 MMOL/L (ref 23–29)
CREAT SERPL-MCNC: 1.3 MG/DL (ref 0.5–1.4)
DIFFERENTIAL METHOD: ABNORMAL
EOSINOPHIL # BLD AUTO: 0.1 K/UL (ref 0–0.5)
EOSINOPHIL NFR BLD: 1.2 % (ref 0–8)
ERYTHROCYTE [DISTWIDTH] IN BLOOD BY AUTOMATED COUNT: 12.9 % (ref 11.5–14.5)
EST. GFR  (NO RACE VARIABLE): 45.4 ML/MIN/1.73 M^2
GLUCOSE SERPL-MCNC: 156 MG/DL (ref 70–110)
HCT VFR BLD AUTO: 31.1 % (ref 37–48.5)
HGB BLD-MCNC: 9.4 G/DL (ref 12–16)
IMM GRANULOCYTES # BLD AUTO: 0.01 K/UL (ref 0–0.04)
IMM GRANULOCYTES NFR BLD AUTO: 0.2 % (ref 0–0.5)
LYMPHOCYTES # BLD AUTO: 1.8 K/UL (ref 1–4.8)
LYMPHOCYTES NFR BLD: 31.9 % (ref 18–48)
MAGNESIUM SERPL-MCNC: 1.5 MG/DL (ref 1.6–2.6)
MCH RBC QN AUTO: 27.6 PG (ref 27–31)
MCHC RBC AUTO-ENTMCNC: 30.2 G/DL (ref 32–36)
MCV RBC AUTO: 92 FL (ref 82–98)
MONOCYTES # BLD AUTO: 0.5 K/UL (ref 0.3–1)
MONOCYTES NFR BLD: 8.5 % (ref 4–15)
NEUTROPHILS # BLD AUTO: 3.3 K/UL (ref 1.8–7.7)
NEUTROPHILS NFR BLD: 57.7 % (ref 38–73)
NRBC BLD-RTO: 0 /100 WBC
PLATELET # BLD AUTO: 243 K/UL (ref 150–450)
PMV BLD AUTO: 10.2 FL (ref 9.2–12.9)
POTASSIUM SERPL-SCNC: 4.4 MMOL/L (ref 3.5–5.1)
RBC # BLD AUTO: 3.4 M/UL (ref 4–5.4)
SODIUM SERPL-SCNC: 139 MMOL/L (ref 136–145)
WBC # BLD AUTO: 5.68 K/UL (ref 3.9–12.7)

## 2023-02-02 PROCEDURE — 85025 COMPLETE CBC W/AUTO DIFF WBC: CPT | Performed by: INTERNAL MEDICINE

## 2023-02-02 PROCEDURE — 99214 OFFICE O/P EST MOD 30 MIN: CPT | Mod: S$PBB,,, | Performed by: INTERNAL MEDICINE

## 2023-02-02 PROCEDURE — 99999 PR PBB SHADOW E&M-EST. PATIENT-LVL V: CPT | Mod: PBBFAC,,, | Performed by: INTERNAL MEDICINE

## 2023-02-02 PROCEDURE — 80048 BASIC METABOLIC PNL TOTAL CA: CPT | Performed by: INTERNAL MEDICINE

## 2023-02-02 PROCEDURE — 99214 PR OFFICE/OUTPT VISIT, EST, LEVL IV, 30-39 MIN: ICD-10-PCS | Mod: S$PBB,,, | Performed by: INTERNAL MEDICINE

## 2023-02-02 PROCEDURE — 36415 COLL VENOUS BLD VENIPUNCTURE: CPT | Performed by: INTERNAL MEDICINE

## 2023-02-02 PROCEDURE — 99215 OFFICE O/P EST HI 40 MIN: CPT | Mod: PBBFAC | Performed by: INTERNAL MEDICINE

## 2023-02-02 PROCEDURE — 99999 PR PBB SHADOW E&M-EST. PATIENT-LVL V: ICD-10-PCS | Mod: PBBFAC,,, | Performed by: INTERNAL MEDICINE

## 2023-02-02 PROCEDURE — 83735 ASSAY OF MAGNESIUM: CPT | Performed by: INTERNAL MEDICINE

## 2023-02-02 RX ORDER — CHLORTHALIDONE 25 MG/1
25 TABLET ORAL DAILY
Qty: 90 TABLET | Refills: 3 | Status: SHIPPED | OUTPATIENT
Start: 2023-02-02 | End: 2023-03-10

## 2023-02-03 ENCOUNTER — TELEPHONE (OUTPATIENT)
Dept: CARDIOLOGY | Facility: CLINIC | Age: 68
End: 2023-02-03
Payer: MEDICARE

## 2023-02-03 DIAGNOSIS — I67.9 CVD (CEREBROVASCULAR DISEASE): ICD-10-CM

## 2023-02-03 DIAGNOSIS — E11.69 TYPE 2 DIABETES MELLITUS WITH OTHER SPECIFIED COMPLICATION, WITH LONG-TERM CURRENT USE OF INSULIN: ICD-10-CM

## 2023-02-03 DIAGNOSIS — I10 ESSENTIAL HYPERTENSION: ICD-10-CM

## 2023-02-03 DIAGNOSIS — Z79.4 TYPE 2 DIABETES MELLITUS WITH OTHER SPECIFIED COMPLICATION, WITH LONG-TERM CURRENT USE OF INSULIN: ICD-10-CM

## 2023-02-03 RX ORDER — CARVEDILOL 25 MG/1
25 TABLET ORAL 2 TIMES DAILY WITH MEALS
Qty: 180 TABLET | Refills: 3 | Status: SHIPPED | OUTPATIENT
Start: 2023-02-03 | End: 2024-03-12

## 2023-02-03 NOTE — TELEPHONE ENCOUNTER
Patient call to update regarding medication adjustments and kidney functions.    Patient states she already takes one magnesium tablet BID.  The medication list notes 250 mg.  She did not tolerate the higher dose due to GI side effects.  She will increase it to 2 tablets BID and see how she does.

## 2023-02-03 NOTE — TELEPHONE ENCOUNTER
Please let her know she has a stable chronic anemia, and her magnesium remains low. She should continue with her Mg supplementation. Her kidney function has declined slightly. Recommend cutting chlorthalidone down to 12.5 mg daily and increasing carvedilol to 25 mg twice daily.

## 2023-02-09 ENCOUNTER — TELEPHONE (OUTPATIENT)
Dept: VASCULAR SURGERY | Facility: CLINIC | Age: 68
End: 2023-02-09
Payer: MEDICARE

## 2023-02-09 DIAGNOSIS — I65.21 CAROTID STENOSIS, RIGHT: Primary | ICD-10-CM

## 2023-02-09 NOTE — TELEPHONE ENCOUNTER
Contacted pt to schedule [facility] and offered sooner appt. Pt accepted sooner appt. Appointments rescheduled, pt verified.   ----- Message from CAPRICE Martinez III, MD sent at 2/8/2023  3:40 PM CST -----  Needs carotid duplex prior to 3/7 consult.  Can see her sooner if she wishes

## 2023-02-14 ENCOUNTER — HOSPITAL ENCOUNTER (OUTPATIENT)
Dept: VASCULAR SURGERY | Facility: CLINIC | Age: 68
Discharge: HOME | End: 2023-02-14
Attending: SURGERY
Payer: MEDICARE

## 2023-02-14 ENCOUNTER — INITIAL CONSULT (OUTPATIENT)
Dept: VASCULAR SURGERY | Facility: CLINIC | Age: 68
End: 2023-02-14
Payer: MEDICARE

## 2023-02-14 ENCOUNTER — HOSPITAL ENCOUNTER (OUTPATIENT)
Dept: CARDIOLOGY | Facility: CLINIC | Age: 68
Discharge: HOME | End: 2023-02-14
Payer: MEDICARE

## 2023-02-14 VITALS
SYSTOLIC BLOOD PRESSURE: 123 MMHG | DIASTOLIC BLOOD PRESSURE: 57 MMHG | TEMPERATURE: 98 F | HEART RATE: 69 BPM | WEIGHT: 149.94 LBS | BODY MASS INDEX: 24.1 KG/M2 | HEIGHT: 66 IN

## 2023-02-14 DIAGNOSIS — I65.21 CAROTID ARTERY STENOSIS, UNILATERAL, RIGHT: ICD-10-CM

## 2023-02-14 DIAGNOSIS — I65.21 CAROTID STENOSIS, RIGHT: ICD-10-CM

## 2023-02-14 DIAGNOSIS — I65.23 BILATERAL CAROTID ARTERY STENOSIS: Primary | ICD-10-CM

## 2023-02-14 DIAGNOSIS — Z01.818 PRE-OP EVALUATION: ICD-10-CM

## 2023-02-14 DIAGNOSIS — Z01.818 PRE-OP EVALUATION: Primary | ICD-10-CM

## 2023-02-14 DIAGNOSIS — I65.21 RIGHT-SIDED EXTRACRANIAL CAROTID ARTERY STENOSIS: ICD-10-CM

## 2023-02-14 PROCEDURE — 93010 EKG 12-LEAD: ICD-10-PCS | Mod: S$PBB,,, | Performed by: INTERNAL MEDICINE

## 2023-02-14 PROCEDURE — 99205 PR OFFICE/OUTPT VISIT, NEW, LEVL V, 60-74 MIN: ICD-10-PCS | Mod: S$PBB,,, | Performed by: SURGERY

## 2023-02-14 PROCEDURE — 93880 EXTRACRANIAL BILAT STUDY: CPT | Mod: PBBFAC | Performed by: SURGERY

## 2023-02-14 PROCEDURE — 93880 PR DUPLEX SCAN EXTRACRANIAL,BILAT: ICD-10-PCS | Mod: 26,S$PBB,, | Performed by: SURGERY

## 2023-02-14 PROCEDURE — 93005 ELECTROCARDIOGRAM TRACING: CPT | Mod: PBBFAC | Performed by: INTERNAL MEDICINE

## 2023-02-14 PROCEDURE — 99205 OFFICE O/P NEW HI 60 MIN: CPT | Mod: S$PBB,,, | Performed by: SURGERY

## 2023-02-14 PROCEDURE — 93880 EXTRACRANIAL BILAT STUDY: CPT | Mod: 26,S$PBB,, | Performed by: SURGERY

## 2023-02-14 PROCEDURE — 99214 OFFICE O/P EST MOD 30 MIN: CPT | Mod: PBBFAC,25 | Performed by: SURGERY

## 2023-02-14 PROCEDURE — 93010 ELECTROCARDIOGRAM REPORT: CPT | Mod: S$PBB,,, | Performed by: INTERNAL MEDICINE

## 2023-02-14 PROCEDURE — 99999 PR PBB SHADOW E&M-EST. PATIENT-LVL IV: CPT | Mod: PBBFAC,,, | Performed by: SURGERY

## 2023-02-14 PROCEDURE — 99999 PR PBB SHADOW E&M-EST. PATIENT-LVL IV: ICD-10-PCS | Mod: PBBFAC,,, | Performed by: SURGERY

## 2023-02-14 NOTE — LETTER
[facility] - Vascular Surg [facility]  [address]  [facility] [address]  Phone: [phone]  Fax: [phone] February 20, 2023      Azucena Lee MD  [address]  [facility] [address]    Patient: Erin Hahn   MR Number: [id number]   YOB: 1955   Date of Visit: 2/14/2023     Dear Dr. Lee:    Thank you for referring Erin Hahn to me for evaluation. Attached are the relevant portions of my assessment and plan of care.    If you have questions, please do not hesitate to call me. I look forward to following Erin along with you.    Sincerely,    CAPRICE Martinez III, MD   Professor and Chief  Vascular and Endovascular Surgery  Vice-Chair, Department of Surgery  [facility]     WCS/hcr    CC  Shyann Peraza MD      (0) indicator not present

## 2023-02-14 NOTE — PROGRESS NOTES
VASCULAR SURGERY SERVICE    REFERRING DOCTOR: Dr eLe    CHIEF COMPLAINT:  Carotid stenosis    HISTORY OF PRESENT ILLNESS: Erin Hahn is a 67 y.o. female, right-handed, with insulin-dependent diabetes and CKD 3A, who had a right hemispheric TIA in July 2021 as manifested by 10 minutes of left arm weakness.  She would had no episodes of stroke TIA or amaurosis before after this.  At this time she was on aspirin and statin this has been maintained.  She was subsequently evaluated for a right carotid stent with Dr. Black, but she ultimately declined this procedure.    Notably, the CTA performed that time and suggested a 50-60% carotid stenosis.  Recent carotid duplex shows progression of her right carotid stenosis to greater than 80%.  She is had no further episodes of stroke TIA or amaurosis since.    She states compliance with her aspirin and statin.    She is no history of MI or shortness of breath.  She had a 2D echo at [facility] with normal ejection fraction    Past Medical History:   Diagnosis Date    Carotid artery bruit     Diabetes mellitus     High cholesterol     Hypertension     Stage 3a chronic kidney disease 11/3/2021    Stroke 07/2021    TIA       Past Surgical History:   Procedure Laterality Date    EYE SURGERY Left 03/2022    TONSILLECTOMY           Current Outpatient Medications:     ascorbic acid, vitamin C, (VITAMIN C) 250 MG tablet, Take 1,000 mg by mouth once daily., Disp: , Rfl:     aspirin (ECOTRIN) 325 MG EC tablet, Take 1 tablet (325 mg total) by mouth once daily., Disp: 30 tablet, Rfl: 11    b complex vitamins capsule, Take 1 capsule by mouth once daily., Disp: , Rfl:     carvediloL (COREG) 25 MG tablet, Take 1 tablet (25 mg total) by mouth 2 (two) times daily with meals., Disp: 180 tablet, Rfl: 3    chlorthalidone (HYGROTEN) 25 MG Tab, Take 1 tablet (25 mg total) by mouth once daily., Disp: 90 tablet, Rfl: 3    co-enzyme Q-10 30 mg capsule, Take 30 mg by mouth 3 (three)  times daily., Disp: , Rfl:     INSULIN DETEMIR (LEVEMIR SUBQ), Inject 17 Units into the skin., Disp: , Rfl:     levocarnitine (L-CARNITINE ORAL), Take by mouth., Disp: , Rfl:     magnesium 250 mg Tab, Take 1,500 mg by mouth., Disp: , Rfl:     omega-3 fatty acids/fish oil (FISH OIL-OMEGA-3 FATTY ACIDS) 300-1,000 mg capsule, Take by mouth once daily., Disp: , Rfl:     sitagliptan-metformin (JANUMET) 50-1,000 mg per tablet, Take 1 tablet by mouth 2 (two) times daily with meals. , Disp: , Rfl:     vitamin D (VITAMIN D3) 1000 units Tab, Take 1,000 Units by mouth once daily., Disp: , Rfl:     felodipine (PLENDIL) 10 MG 24 hr tablet, Take 1 tablet (10 mg total) by mouth once daily., Disp: 30 tablet, Rfl: 11    rosuvastatin (CRESTOR) 20 MG tablet, Take 1 tablet (20 mg total) by mouth once daily., Disp: 30 tablet, Rfl: 11    Review of patient's allergies indicates:   Allergen Reactions    Atorvastatin Other (See Comments)     myalgias  myalgias       Family History   Problem Relation Age of Onset    Diabetes Mother     Hepatitis Mother     No Known Problems Father     No Known Problems Sister     No Known Problems Brother     COPD Maternal Aunt     No Known Problems Maternal Uncle     No Known Problems Paternal Aunt     No Known Problems Paternal Uncle     Diabetes Maternal Grandmother     No Known Problems Maternal Grandfather     No Known Problems Paternal Grandmother     No Known Problems Paternal Grandfather     Anemia Neg Hx     Arrhythmia Neg Hx     Asthma Neg Hx     Clotting disorder Neg Hx     Fainting Neg Hx     Heart attack Neg Hx     Heart disease Neg Hx     Heart failure Neg Hx     Hyperlipidemia Neg Hx     Hypertension Neg Hx     Stroke Neg Hx     Atrial Septal Defect Neg Hx        Social History     Tobacco Use    Smoking status: Never    Smokeless tobacco: Never   Substance Use Topics    Alcohol use: No     Alcohol/week: 0.0 standard drinks    Drug use: No       REVIEW OF SYSTEMS:  General: No chills, fever,  "malaise, changes in weight  HEENT: No visual changes, difficulty hearing  Cardiovascular: No chest pain, palpitations, claudication  Pulmonary: No dyspnea, cough, wheezing  Gastrointestinal: No nausea, vomiting, diarrhea, constipation  Genitourinary: No dysuria, low urine output, hematuria  Endocrine: No polydipsia, polyphagia  Hematologic: No fatigue with exertion, pica, pallor  Musculoskeletal: No extremity or joint pain, no back pain, no difficulty with ambulation  Neurologic: no seizures, no headaches, no weakness  Psychiatric: no mood disturbance      PHYSICAL EXAM:   BP (!) 123/57 (BP Location: Left arm, Patient Position: Sitting, BP Method: Large (Automatic))   Pulse 69   Temp 98 °F (36.7 °C) (Oral)   Ht 5' 6" (1.676 m)   Wt 68 kg (149 lb 14.6 oz)   BMI 24.20 kg/m²   Constitutional:  Alert,   Well-appearing  In no distress.   Neurological: Normal speech  no focal findings  CN II - XII grossly intact.  Neuro completely intact   Psychiatric: Mood and affect appropriate and symmetric.   HEENT: Normocephalic / atraumatic  PERRLA  Midline trachea  No scars across the neck neck supple   Cardiac: Regular rate and rhythm.   Pulmonary: Normal pulmonary effort.   Abdomen: Soft, not distended.     Skin: Warm and well perfused.    Vascular:  2+ radial pulses bilaterally   Extremities/  Musculoskeletal: No edema.        IMAGING:  Carotid duplex today reveals a greater than 80% right carotid stenosis with a peak velocity of 452, end-diastolic of 121.  There is no significant left carotid stenosis.    CTA from June 2021 was personally reviewed.  That time she had a calcific right ICA stenosis of at least 60% by my reading.  This was read by Radiology is 50%    IMPRESSION:  Right greater than 80% carotid stenosis, progressive, with history of right hemispheric TIA.  Definitive treatment is indicated    PLAN:  Right carotid endarterectomy, 02/27/2023.   She was offered earlier date but chose this  GETA  Continue aspirin " and statin until surgery    I have explained the risks, benefits and alternatives for this procedure in detail.  The patient voices understanding and all questions have be answered, and agrees to proceed with the procedure.     CARLOS Martinez III, MD, FACS  Professor and Chief, Vascular and Endovascular Surgery

## 2023-02-14 NOTE — H&P (VIEW-ONLY)
"Interval History:  No acute events overnight.  Now with GPC in chains resembling strep on blood culture of 5/11 in addition to Pseudomonas.   Afebrile, no leukocytosis.  States he "feels great".  Abd pain resolved.  Ambulating in the halls.     Review of Systems   Constitutional: Negative for activity change, appetite change, chills, diaphoresis, fatigue and fever.   Respiratory: Negative for cough and shortness of breath.    Cardiovascular: Negative for chest pain.   Gastrointestinal: Negative for abdominal distention, abdominal pain, constipation, diarrhea, nausea and vomiting.   Genitourinary: Negative for difficulty urinating, dysuria and flank pain.   Musculoskeletal: Negative for back pain.   Skin: Negative for color change, pallor, rash and wound.   Neurological: Negative for dizziness and headaches.   Psychiatric/Behavioral: Negative for agitation and behavioral problems.   All other systems reviewed and are negative.    Objective:     Vital Signs (Most Recent):  Temp: 98.1 °F (36.7 °C) (05/14/20 0735)  Pulse: 80 (05/14/20 0735)  Resp: 18 (05/14/20 0735)  BP: (!) 126/59 (05/14/20 0735)  SpO2: 95 % (05/14/20 0735) Vital Signs (24h Range):  Temp:  [97.9 °F (36.6 °C)-98.6 °F (37 °C)] 98.1 °F (36.7 °C)  Pulse:  [69-80] 80  Resp:  [16-18] 18  SpO2:  [91 %-99 %] 95 %  BP: (126-144)/(59-64) 126/59     Weight: 61.5 kg (135 lb 9.3 oz)  Body mass index is 23.27 kg/m².    Estimated Creatinine Clearance: 26.2 mL/min (A) (based on SCr of 1.6 mg/dL (H)).    Physical Exam   Constitutional: He is oriented to person, place, and time. He appears well-developed and well-nourished. No distress.   HENT:   Head: Normocephalic and atraumatic.   Eyes: Conjunctivae are normal. No scleral icterus.   Neck: Normal range of motion.   Cardiovascular: Normal rate, regular rhythm and normal heart sounds. Exam reveals no gallop and no friction rub.   No murmur heard.  Pulmonary/Chest: Effort normal. No respiratory distress. He has no " VASCULAR SURGERY SERVICE    REFERRING DOCTOR: Dr Lee    CHIEF COMPLAINT:  Carotid stenosis    HISTORY OF PRESENT ILLNESS: Erin Hahn is a 67 y.o. female, right-handed, with insulin-dependent diabetes and CKD 3A, who had a right hemispheric TIA in July 2021 as manifested by 10 minutes of left arm weakness.  She would had no episodes of stroke TIA or amaurosis before after this.  At this time she was on aspirin and statin this has been maintained.  She was subsequently evaluated for a right carotid stent with Dr. Black, but she ultimately declined this procedure.    Notably, the CTA performed that time and suggested a 50-60% carotid stenosis.  Recent carotid duplex shows progression of her right carotid stenosis to greater than 80%.  She is had no further episodes of stroke TIA or amaurosis since.    She states compliance with her aspirin and statin.    She is no history of MI or shortness of breath.  She had a 2D echo at [facility] with normal ejection fraction    Past Medical History:   Diagnosis Date    Carotid artery bruit     Diabetes mellitus     High cholesterol     Hypertension     Stage 3a chronic kidney disease 11/3/2021    Stroke 07/2021    TIA       Past Surgical History:   Procedure Laterality Date    EYE SURGERY Left 03/2022    TONSILLECTOMY           Current Outpatient Medications:     ascorbic acid, vitamin C, (VITAMIN C) 250 MG tablet, Take 1,000 mg by mouth once daily., Disp: , Rfl:     aspirin (ECOTRIN) 325 MG EC tablet, Take 1 tablet (325 mg total) by mouth once daily., Disp: 30 tablet, Rfl: 11    b complex vitamins capsule, Take 1 capsule by mouth once daily., Disp: , Rfl:     carvediloL (COREG) 25 MG tablet, Take 1 tablet (25 mg total) by mouth 2 (two) times daily with meals., Disp: 180 tablet, Rfl: 3    chlorthalidone (HYGROTEN) 25 MG Tab, Take 1 tablet (25 mg total) by mouth once daily., Disp: 90 tablet, Rfl: 3    co-enzyme Q-10 30 mg capsule, Take 30 mg by mouth 3 (three)  times daily., Disp: , Rfl:     INSULIN DETEMIR (LEVEMIR SUBQ), Inject 17 Units into the skin., Disp: , Rfl:     levocarnitine (L-CARNITINE ORAL), Take by mouth., Disp: , Rfl:     magnesium 250 mg Tab, Take 1,500 mg by mouth., Disp: , Rfl:     omega-3 fatty acids/fish oil (FISH OIL-OMEGA-3 FATTY ACIDS) 300-1,000 mg capsule, Take by mouth once daily., Disp: , Rfl:     sitagliptan-metformin (JANUMET) 50-1,000 mg per tablet, Take 1 tablet by mouth 2 (two) times daily with meals. , Disp: , Rfl:     vitamin D (VITAMIN D3) 1000 units Tab, Take 1,000 Units by mouth once daily., Disp: , Rfl:     felodipine (PLENDIL) 10 MG 24 hr tablet, Take 1 tablet (10 mg total) by mouth once daily., Disp: 30 tablet, Rfl: 11    rosuvastatin (CRESTOR) 20 MG tablet, Take 1 tablet (20 mg total) by mouth once daily., Disp: 30 tablet, Rfl: 11    Review of patient's allergies indicates:   Allergen Reactions    Atorvastatin Other (See Comments)     myalgias  myalgias       Family History   Problem Relation Age of Onset    Diabetes Mother     Hepatitis Mother     No Known Problems Father     No Known Problems Sister     No Known Problems Brother     COPD Maternal Aunt     No Known Problems Maternal Uncle     No Known Problems Paternal Aunt     No Known Problems Paternal Uncle     Diabetes Maternal Grandmother     No Known Problems Maternal Grandfather     No Known Problems Paternal Grandmother     No Known Problems Paternal Grandfather     Anemia Neg Hx     Arrhythmia Neg Hx     Asthma Neg Hx     Clotting disorder Neg Hx     Fainting Neg Hx     Heart attack Neg Hx     Heart disease Neg Hx     Heart failure Neg Hx     Hyperlipidemia Neg Hx     Hypertension Neg Hx     Stroke Neg Hx     Atrial Septal Defect Neg Hx        Social History     Tobacco Use    Smoking status: Never    Smokeless tobacco: Never   Substance Use Topics    Alcohol use: No     Alcohol/week: 0.0 standard drinks    Drug use: No       REVIEW OF SYSTEMS:  General: No chills, fever,  wheezes. He has no rales.   Abdominal: Soft. He exhibits no distension. There is no tenderness. There is no guarding.   Musculoskeletal: Normal range of motion. He exhibits no edema or deformity.   Neurological: He is alert and oriented to person, place, and time. No cranial nerve deficit.   Skin: Skin is warm and dry. No rash noted. He is not diaphoretic. No erythema.   Psychiatric: He has a normal mood and affect. His behavior is normal.   Vitals reviewed.      Significant Labs:   Blood Culture:   Recent Labs   Lab 05/11/20  0015 05/11/20  0016 05/12/20  1326 05/12/20  1332   LABBLOO Gram stain aer bottle: Gram negative rods  05/12/2020  10:06   Positive results previously called  Gram stain maribel bottle: Gram positive cocci in chains resembling Strep  Results called to and read back by:Palmira Tinajero RN 05/13/2020  17:02  PSEUDOMONAS AERUGINOSA  For susceptibility see order #[id number]  * Gram stain aer bottle: Gram negative rods   Results called to and read back by: sOwaldo Jacome RN  05/11/2020    20:21  PSEUDOMONAS AERUGINOSA* No Growth to date  No Growth to date No Growth to date  No Growth to date     CBC:   Recent Labs   Lab 05/12/20  1325 05/13/20  0428 05/14/20  0420   WBC 9.78 8.70 8.21   HGB 13.8* 12.4* 12.8*   HCT 43.0 38.7* 39.7*    100* 125*     CMP:   Recent Labs   Lab 05/12/20  1325 05/13/20  0428 05/14/20  0420    143 142   K 4.0 3.4* 3.7    107 105   CO2 26 25 25   GLU 89 73 86   BUN 26* 25* 25*   CREATININE 1.6* 1.5* 1.6*   CALCIUM 8.5* 8.4* 8.7   PROT 7.5 6.5 6.8   ALBUMIN 3.3* 3.1* 3.1*   BILITOT 1.0 1.0 1.2*   ALKPHOS 99 90 125   AST 26 18 21   ALT 9* 8* 8*   ANIONGAP 10 11 12   EGFRNONAA 37.6* 40.7* 37.6*     Urine Culture: No results for input(s): LABURIN in the last 4320 hours.  Urine Studies:   Recent Labs   Lab 05/12/20  1355   COLORU Yellow   APPEARANCEUA Clear   PHUR 5.0   SPECGRAV 1.015   PROTEINUA Negative   GLUCUA Negative   KETONESU Negative    BILIRUBINUA Negative   OCCULTUA 2+*   NITRITE Negative   LEUKOCYTESUR Negative   RBCUA 14*   WBCUA 4     Wound Culture: No results for input(s): LABAERO in the last 4320 hours.    Significant Imaging: I have reviewed all pertinent imaging results/findings within the past 24 hours.   "malaise, changes in weight  HEENT: No visual changes, difficulty hearing  Cardiovascular: No chest pain, palpitations, claudication  Pulmonary: No dyspnea, cough, wheezing  Gastrointestinal: No nausea, vomiting, diarrhea, constipation  Genitourinary: No dysuria, low urine output, hematuria  Endocrine: No polydipsia, polyphagia  Hematologic: No fatigue with exertion, pica, pallor  Musculoskeletal: No extremity or joint pain, no back pain, no difficulty with ambulation  Neurologic: no seizures, no headaches, no weakness  Psychiatric: no mood disturbance      PHYSICAL EXAM:   BP (!) 123/57 (BP Location: Left arm, Patient Position: Sitting, BP Method: Large (Automatic))   Pulse 69   Temp 98 °F (36.7 °C) (Oral)   Ht 5' 6" (1.676 m)   Wt 68 kg (149 lb 14.6 oz)   BMI 24.20 kg/m²   Constitutional:  Alert,   Well-appearing  In no distress.   Neurological: Normal speech  no focal findings  CN II - XII grossly intact.  Neuro completely intact   Psychiatric: Mood and affect appropriate and symmetric.   HEENT: Normocephalic / atraumatic  PERRLA  Midline trachea  No scars across the neck neck supple   Cardiac: Regular rate and rhythm.   Pulmonary: Normal pulmonary effort.   Abdomen: Soft, not distended.     Skin: Warm and well perfused.    Vascular:  2+ radial pulses bilaterally   Extremities/  Musculoskeletal: No edema.        IMAGING:  Carotid duplex today reveals a greater than 80% right carotid stenosis with a peak velocity of 452, end-diastolic of 121.  There is no significant left carotid stenosis.    CTA from June 2021 was personally reviewed.  That time she had a calcific right ICA stenosis of at least 60% by my reading.  This was read by Radiology is 50%    IMPRESSION:  Right greater than 80% carotid stenosis, progressive, with history of right hemispheric TIA.  Definitive treatment is indicated    PLAN:  Right carotid endarterectomy, 02/27/2023.   She was offered earlier date but chose this  GETA  Continue aspirin " and statin until surgery    I have explained the risks, benefits and alternatives for this procedure in detail.  The patient voices understanding and all questions have be answered, and agrees to proceed with the procedure.     CARLOS Martinez III, MD, FACS  Professor and Chief, Vascular and Endovascular Surgery

## 2023-02-15 ENCOUNTER — HOSPITAL ENCOUNTER (OUTPATIENT)
Dept: RADIOLOGY | Facility: HOSPITAL | Age: 68
Discharge: HOME | End: 2023-02-15
Attending: SURGERY
Payer: MEDICARE

## 2023-02-15 DIAGNOSIS — Z01.818 PRE-OP EVALUATION: ICD-10-CM

## 2023-02-15 PROCEDURE — 71046 X-RAY EXAM CHEST 2 VIEWS: CPT | Mod: 26,,, | Performed by: RADIOLOGY

## 2023-02-15 PROCEDURE — 71046 X-RAY EXAM CHEST 2 VIEWS: CPT | Mod: TC,FY,PO

## 2023-02-15 PROCEDURE — 71046 XR CHEST PA AND LATERAL: ICD-10-PCS | Mod: 26,,, | Performed by: RADIOLOGY

## 2023-02-24 ENCOUNTER — TELEPHONE (OUTPATIENT)
Dept: VASCULAR SURGERY | Facility: CLINIC | Age: 68
End: 2023-02-24
Payer: MEDICARE

## 2023-02-27 ENCOUNTER — ANESTHESIA (OUTPATIENT)
Dept: SURGERY | Facility: HOSPITAL | Age: 68
DRG: 039 | End: 2023-02-27
Payer: MEDICARE

## 2023-02-27 ENCOUNTER — ANESTHESIA EVENT (OUTPATIENT)
Dept: SURGERY | Facility: HOSPITAL | Age: 68
DRG: 039 | End: 2023-02-27
Payer: MEDICARE

## 2023-02-27 ENCOUNTER — HOSPITAL ENCOUNTER (INPATIENT)
Facility: HOSPITAL | Age: 68
LOS: 1 days | Discharge: HOME | DRG: 039 | End: 2023-02-28
Attending: SURGERY | Admitting: SURGERY
Payer: MEDICARE

## 2023-02-27 DIAGNOSIS — I67.9 CVD (CEREBROVASCULAR DISEASE): Primary | ICD-10-CM

## 2023-02-27 DIAGNOSIS — I65.29 CAROTID STENOSIS: ICD-10-CM

## 2023-02-27 LAB
ABO + RH BLD: NORMAL
ANION GAP SERPL CALC-SCNC: 8 MMOL/L (ref 8–16)
BASOPHILS # BLD AUTO: 0.03 K/UL (ref 0–0.2)
BASOPHILS NFR BLD: 0.5 % (ref 0–1.9)
BLD GP AB SCN CELLS X3 SERPL QL: NORMAL
BUN SERPL-MCNC: 26 MG/DL (ref 8–23)
CALCIUM SERPL-MCNC: 10.1 MG/DL (ref 8.7–10.5)
CHLORIDE SERPL-SCNC: 110 MMOL/L (ref 95–110)
CO2 SERPL-SCNC: 22 MMOL/L (ref 23–29)
CREAT SERPL-MCNC: 1.3 MG/DL (ref 0.5–1.4)
DIFFERENTIAL METHOD: ABNORMAL
EOSINOPHIL # BLD AUTO: 0.1 K/UL (ref 0–0.5)
EOSINOPHIL NFR BLD: 1.3 % (ref 0–8)
ERYTHROCYTE [DISTWIDTH] IN BLOOD BY AUTOMATED COUNT: 12.8 % (ref 11.5–14.5)
EST. GFR  (NO RACE VARIABLE): 45.4 ML/MIN/1.73 M^2
ESTIMATED AVG GLUCOSE: 146 MG/DL (ref 68–131)
GLUCOSE SERPL-MCNC: 213 MG/DL (ref 70–110)
HBA1C MFR BLD: 6.7 % (ref 4–5.6)
HCT VFR BLD AUTO: 30.7 % (ref 37–48.5)
HGB BLD-MCNC: 9.4 G/DL (ref 12–16)
IMM GRANULOCYTES # BLD AUTO: 0.02 K/UL (ref 0–0.04)
IMM GRANULOCYTES NFR BLD AUTO: 0.3 % (ref 0–0.5)
LYMPHOCYTES # BLD AUTO: 1.3 K/UL (ref 1–4.8)
LYMPHOCYTES NFR BLD: 21.2 % (ref 18–48)
MCH RBC QN AUTO: 27.1 PG (ref 27–31)
MCHC RBC AUTO-ENTMCNC: 30.6 G/DL (ref 32–36)
MCV RBC AUTO: 89 FL (ref 82–98)
MONOCYTES # BLD AUTO: 0.5 K/UL (ref 0.3–1)
MONOCYTES NFR BLD: 8.2 % (ref 4–15)
NEUTROPHILS # BLD AUTO: 4.3 K/UL (ref 1.8–7.7)
NEUTROPHILS NFR BLD: 68.5 % (ref 38–73)
NRBC BLD-RTO: 0 /100 WBC
PLATELET # BLD AUTO: 244 K/UL (ref 150–450)
PMV BLD AUTO: 10.4 FL (ref 9.2–12.9)
POC ACTIVATED CLOTTING TIME K: 269 SEC (ref 74–137)
POCT GLUCOSE: 180 MG/DL (ref 70–110)
POCT GLUCOSE: 216 MG/DL (ref 70–110)
POCT GLUCOSE: 295 MG/DL (ref 70–110)
POTASSIUM SERPL-SCNC: 4.4 MMOL/L (ref 3.5–5.1)
RBC # BLD AUTO: 3.47 M/UL (ref 4–5.4)
SAMPLE: ABNORMAL
SODIUM SERPL-SCNC: 140 MMOL/L (ref 136–145)
WBC # BLD AUTO: 6.31 K/UL (ref 3.9–12.7)

## 2023-02-27 PROCEDURE — 63600175 PHARM REV CODE 636 W HCPCS: Performed by: STUDENT IN AN ORGANIZED HEALTH CARE EDUCATION/TRAINING PROGRAM

## 2023-02-27 PROCEDURE — 35301 RECHANNELING OF ARTERY: CPT | Mod: RT,GC,, | Performed by: SURGERY

## 2023-02-27 PROCEDURE — 36000707: Performed by: SURGERY

## 2023-02-27 PROCEDURE — 36415 COLL VENOUS BLD VENIPUNCTURE: CPT

## 2023-02-27 PROCEDURE — 99900035 HC TECH TIME PER 15 MIN (STAT)

## 2023-02-27 PROCEDURE — C1768 GRAFT, VASCULAR: HCPCS | Performed by: SURGERY

## 2023-02-27 PROCEDURE — 25000003 PHARM REV CODE 250: Performed by: STUDENT IN AN ORGANIZED HEALTH CARE EDUCATION/TRAINING PROGRAM

## 2023-02-27 PROCEDURE — 71000039 HC RECOVERY, EACH ADD'L HOUR: Performed by: SURGERY

## 2023-02-27 PROCEDURE — 36000706: Performed by: SURGERY

## 2023-02-27 PROCEDURE — 37000009 HC ANESTHESIA EA ADD 15 MINS: Performed by: SURGERY

## 2023-02-27 PROCEDURE — 71000015 HC POSTOP RECOV 1ST HR: Performed by: SURGERY

## 2023-02-27 PROCEDURE — 71000033 HC RECOVERY, INTIAL HOUR: Performed by: SURGERY

## 2023-02-27 PROCEDURE — D9220A PRA ANESTHESIA: Mod: ,,, | Performed by: STUDENT IN AN ORGANIZED HEALTH CARE EDUCATION/TRAINING PROGRAM

## 2023-02-27 PROCEDURE — 80048 BASIC METABOLIC PNL TOTAL CA: CPT

## 2023-02-27 PROCEDURE — 20600001 HC STEP DOWN PRIVATE ROOM

## 2023-02-27 PROCEDURE — 27201423 OPTIME MED/SURG SUP & DEVICES STERILE SUPPLY: Performed by: SURGERY

## 2023-02-27 PROCEDURE — 63600175 PHARM REV CODE 636 W HCPCS: Performed by: SURGERY

## 2023-02-27 PROCEDURE — 94761 N-INVAS EAR/PLS OXIMETRY MLT: CPT

## 2023-02-27 PROCEDURE — 85025 COMPLETE CBC W/AUTO DIFF WBC: CPT

## 2023-02-27 PROCEDURE — 86900 BLOOD TYPING SEROLOGIC ABO: CPT

## 2023-02-27 PROCEDURE — D9220A PRA ANESTHESIA: ICD-10-PCS | Mod: ,,, | Performed by: STUDENT IN AN ORGANIZED HEALTH CARE EDUCATION/TRAINING PROGRAM

## 2023-02-27 PROCEDURE — 82962 GLUCOSE BLOOD TEST: CPT | Performed by: SURGERY

## 2023-02-27 PROCEDURE — 71000016 HC POSTOP RECOV ADDL HR: Performed by: SURGERY

## 2023-02-27 PROCEDURE — 37000008 HC ANESTHESIA 1ST 15 MINUTES: Performed by: SURGERY

## 2023-02-27 PROCEDURE — 35301 PR THROMBOENDARTECTMY NECK,NECK INCIS: ICD-10-PCS | Mod: RT,GC,, | Performed by: SURGERY

## 2023-02-27 PROCEDURE — 25000003 PHARM REV CODE 250

## 2023-02-27 PROCEDURE — 83036 HEMOGLOBIN GLYCOSYLATED A1C: CPT | Performed by: STUDENT IN AN ORGANIZED HEALTH CARE EDUCATION/TRAINING PROGRAM

## 2023-02-27 DEVICE — GRAFT VAS GUARD 0.8X8CM BOVINE: Type: IMPLANTABLE DEVICE | Site: CAROTID | Status: FUNCTIONAL

## 2023-02-27 RX ORDER — MORPHINE SULFATE 2 MG/ML
3 INJECTION, SOLUTION INTRAMUSCULAR; INTRAVENOUS
Status: DISCONTINUED | OUTPATIENT
Start: 2023-02-27 | End: 2023-02-28 | Stop reason: HOSPADM

## 2023-02-27 RX ORDER — HEPARIN SODIUM 1000 [USP'U]/ML
INJECTION, SOLUTION INTRAVENOUS; SUBCUTANEOUS
Status: DISCONTINUED | OUTPATIENT
Start: 2023-02-27 | End: 2023-02-27

## 2023-02-27 RX ORDER — DEXAMETHASONE SODIUM PHOSPHATE 4 MG/ML
INJECTION, SOLUTION INTRA-ARTICULAR; INTRALESIONAL; INTRAMUSCULAR; INTRAVENOUS; SOFT TISSUE
Status: DISCONTINUED | OUTPATIENT
Start: 2023-02-27 | End: 2023-02-27

## 2023-02-27 RX ORDER — HYDROCODONE BITARTRATE AND ACETAMINOPHEN 5; 325 MG/1; MG/1
1 TABLET ORAL EVERY 4 HOURS PRN
Status: DISCONTINUED | OUTPATIENT
Start: 2023-02-27 | End: 2023-02-28 | Stop reason: HOSPADM

## 2023-02-27 RX ORDER — HYDROMORPHONE HYDROCHLORIDE 1 MG/ML
0.2 INJECTION, SOLUTION INTRAMUSCULAR; INTRAVENOUS; SUBCUTANEOUS EVERY 5 MIN PRN
Status: COMPLETED | OUTPATIENT
Start: 2023-02-27 | End: 2023-02-27

## 2023-02-27 RX ORDER — CEFAZOLIN SODIUM 1 G/3ML
INJECTION, POWDER, FOR SOLUTION INTRAMUSCULAR; INTRAVENOUS
Status: DISCONTINUED | OUTPATIENT
Start: 2023-02-27 | End: 2023-02-27

## 2023-02-27 RX ORDER — INSULIN ASPART 100 [IU]/ML
0-5 INJECTION, SOLUTION INTRAVENOUS; SUBCUTANEOUS
Status: DISCONTINUED | OUTPATIENT
Start: 2023-02-27 | End: 2023-02-28 | Stop reason: HOSPADM

## 2023-02-27 RX ORDER — PRAVASTATIN SODIUM 40 MG/1
80 TABLET ORAL DAILY
Status: DISCONTINUED | OUTPATIENT
Start: 2023-02-27 | End: 2023-02-28 | Stop reason: HOSPADM

## 2023-02-27 RX ORDER — ONDANSETRON 2 MG/ML
INJECTION INTRAMUSCULAR; INTRAVENOUS
Status: DISCONTINUED | OUTPATIENT
Start: 2023-02-27 | End: 2023-02-27

## 2023-02-27 RX ORDER — IBUPROFEN 200 MG
16 TABLET ORAL
Status: DISCONTINUED | OUTPATIENT
Start: 2023-02-27 | End: 2023-02-28 | Stop reason: HOSPADM

## 2023-02-27 RX ORDER — HYDRALAZINE HYDROCHLORIDE 20 MG/ML
10 INJECTION INTRAMUSCULAR; INTRAVENOUS EVERY 6 HOURS PRN
Status: DISCONTINUED | OUTPATIENT
Start: 2023-02-27 | End: 2023-02-28 | Stop reason: HOSPADM

## 2023-02-27 RX ORDER — CHLORTHALIDONE 25 MG/1
25 TABLET ORAL DAILY
Status: DISCONTINUED | OUTPATIENT
Start: 2023-02-27 | End: 2023-02-28 | Stop reason: HOSPADM

## 2023-02-27 RX ORDER — TRAMADOL HYDROCHLORIDE 50 MG/1
50 TABLET ORAL EVERY 4 HOURS PRN
Status: DISCONTINUED | OUTPATIENT
Start: 2023-02-27 | End: 2023-02-28 | Stop reason: HOSPADM

## 2023-02-27 RX ORDER — HEPARIN SODIUM 1000 [USP'U]/ML
INJECTION, SOLUTION INTRAVENOUS; SUBCUTANEOUS
Status: DISCONTINUED | OUTPATIENT
Start: 2023-02-27 | End: 2023-02-27 | Stop reason: HOSPADM

## 2023-02-27 RX ORDER — CARVEDILOL 25 MG/1
25 TABLET ORAL 2 TIMES DAILY WITH MEALS
Status: DISCONTINUED | OUTPATIENT
Start: 2023-02-27 | End: 2023-02-28 | Stop reason: HOSPADM

## 2023-02-27 RX ORDER — GLUCAGON 1 MG
1 KIT INJECTION
Status: DISCONTINUED | OUTPATIENT
Start: 2023-02-27 | End: 2023-02-28 | Stop reason: HOSPADM

## 2023-02-27 RX ORDER — ASPIRIN 325 MG
325 TABLET, DELAYED RELEASE (ENTERIC COATED) ORAL DAILY
Status: DISCONTINUED | OUTPATIENT
Start: 2023-02-27 | End: 2023-02-28 | Stop reason: HOSPADM

## 2023-02-27 RX ORDER — SODIUM CHLORIDE 0.9 % (FLUSH) 0.9 %
10 SYRINGE (ML) INJECTION
Status: DISCONTINUED | OUTPATIENT
Start: 2023-02-27 | End: 2023-02-27 | Stop reason: HOSPADM

## 2023-02-27 RX ORDER — SODIUM CHLORIDE 9 MG/ML
INJECTION, SOLUTION INTRAVENOUS CONTINUOUS
Status: DISCONTINUED | OUTPATIENT
Start: 2023-02-27 | End: 2023-02-27

## 2023-02-27 RX ORDER — NICARDIPINE HYDROCHLORIDE 0.2 MG/ML
0-15 INJECTION INTRAVENOUS CONTINUOUS
Status: DISCONTINUED | OUTPATIENT
Start: 2023-02-27 | End: 2023-02-28 | Stop reason: HOSPADM

## 2023-02-27 RX ORDER — FENTANYL CITRATE 50 UG/ML
INJECTION, SOLUTION INTRAMUSCULAR; INTRAVENOUS
Status: DISCONTINUED | OUTPATIENT
Start: 2023-02-27 | End: 2023-02-27

## 2023-02-27 RX ORDER — ROCURONIUM BROMIDE 10 MG/ML
INJECTION, SOLUTION INTRAVENOUS
Status: DISCONTINUED | OUTPATIENT
Start: 2023-02-27 | End: 2023-02-27

## 2023-02-27 RX ORDER — PROPOFOL 10 MG/ML
VIAL (ML) INTRAVENOUS CONTINUOUS PRN
Status: DISCONTINUED | OUTPATIENT
Start: 2023-02-27 | End: 2023-02-27

## 2023-02-27 RX ORDER — MIDAZOLAM HYDROCHLORIDE 5 MG/ML
INJECTION INTRAMUSCULAR; INTRAVENOUS
Status: DISCONTINUED | OUTPATIENT
Start: 2023-02-27 | End: 2023-02-27

## 2023-02-27 RX ORDER — DROPERIDOL 2.5 MG/ML
0.62 INJECTION, SOLUTION INTRAMUSCULAR; INTRAVENOUS ONCE AS NEEDED
Status: DISCONTINUED | OUTPATIENT
Start: 2023-02-27 | End: 2023-02-27 | Stop reason: HOSPADM

## 2023-02-27 RX ORDER — KETAMINE HCL IN 0.9 % NACL 50 MG/5 ML
SYRINGE (ML) INTRAVENOUS
Status: DISCONTINUED | OUTPATIENT
Start: 2023-02-27 | End: 2023-02-27

## 2023-02-27 RX ORDER — IBUPROFEN 200 MG
24 TABLET ORAL
Status: DISCONTINUED | OUTPATIENT
Start: 2023-02-27 | End: 2023-02-28 | Stop reason: HOSPADM

## 2023-02-27 RX ORDER — LIDOCAINE HYDROCHLORIDE 20 MG/ML
INJECTION, SOLUTION EPIDURAL; INFILTRATION; INTRACAUDAL; PERINEURAL
Status: DISCONTINUED | OUTPATIENT
Start: 2023-02-27 | End: 2023-02-27

## 2023-02-27 RX ORDER — PROTAMINE SULFATE 10 MG/ML
INJECTION, SOLUTION INTRAVENOUS
Status: DISCONTINUED | OUTPATIENT
Start: 2023-02-27 | End: 2023-02-27

## 2023-02-27 RX ORDER — MUPIROCIN 20 MG/G
OINTMENT TOPICAL 2 TIMES DAILY
Status: DISCONTINUED | OUTPATIENT
Start: 2023-02-27 | End: 2023-02-28 | Stop reason: HOSPADM

## 2023-02-27 RX ORDER — PROPOFOL 10 MG/ML
VIAL (ML) INTRAVENOUS
Status: DISCONTINUED | OUTPATIENT
Start: 2023-02-27 | End: 2023-02-27

## 2023-02-27 RX ORDER — LABETALOL HCL 20 MG/4 ML
5 SYRINGE (ML) INTRAVENOUS EVERY 10 MIN PRN
Status: COMPLETED | OUTPATIENT
Start: 2023-02-27 | End: 2023-02-27

## 2023-02-27 RX ADMIN — ROCURONIUM BROMIDE 50 MG: 10 INJECTION INTRAVENOUS at 11:02

## 2023-02-27 RX ADMIN — REMIFENTANIL HYDROCHLORIDE 0.1 MCG/KG/MIN: 1 INJECTION, POWDER, LYOPHILIZED, FOR SOLUTION INTRAVENOUS at 11:02

## 2023-02-27 RX ADMIN — HYDROMORPHONE HYDROCHLORIDE 0.2 MG: 1 INJECTION, SOLUTION INTRAMUSCULAR; INTRAVENOUS; SUBCUTANEOUS at 02:02

## 2023-02-27 RX ADMIN — PROTAMINE SULFATE 10 MG: 10 INJECTION, SOLUTION INTRAVENOUS at 12:02

## 2023-02-27 RX ADMIN — HYDROCODONE BITARTRATE AND ACETAMINOPHEN 1 TABLET: 5; 325 TABLET ORAL at 05:02

## 2023-02-27 RX ADMIN — INSULIN ASPART 3 UNITS: 100 INJECTION, SOLUTION INTRAVENOUS; SUBCUTANEOUS at 08:02

## 2023-02-27 RX ADMIN — CARVEDILOL 25 MG: 25 TABLET, FILM COATED ORAL at 04:02

## 2023-02-27 RX ADMIN — ONDANSETRON 4 MG: 2 INJECTION INTRAMUSCULAR; INTRAVENOUS at 11:02

## 2023-02-27 RX ADMIN — PRAVASTATIN SODIUM 80 MG: 20 TABLET ORAL at 02:02

## 2023-02-27 RX ADMIN — ASPIRIN 325 MG: 325 TABLET, COATED ORAL at 02:02

## 2023-02-27 RX ADMIN — HYDROMORPHONE HYDROCHLORIDE 0.2 MG: 1 INJECTION, SOLUTION INTRAMUSCULAR; INTRAVENOUS; SUBCUTANEOUS at 01:02

## 2023-02-27 RX ADMIN — LABETALOL HYDROCHLORIDE 5 MG: 5 INJECTION, SOLUTION INTRAVENOUS at 01:02

## 2023-02-27 RX ADMIN — CEFAZOLIN 2 G: 330 INJECTION, POWDER, FOR SOLUTION INTRAMUSCULAR; INTRAVENOUS at 11:02

## 2023-02-27 RX ADMIN — HYDROCODONE BITARTRATE AND ACETAMINOPHEN 1 TABLET: 5; 325 TABLET ORAL at 01:02

## 2023-02-27 RX ADMIN — CEFAZOLIN 2 G: 2 INJECTION, POWDER, FOR SOLUTION INTRAMUSCULAR; INTRAVENOUS at 09:02

## 2023-02-27 RX ADMIN — MUPIROCIN: 20 OINTMENT TOPICAL at 09:02

## 2023-02-27 RX ADMIN — LIDOCAINE HYDROCHLORIDE 100 MG: 20 INJECTION, SOLUTION EPIDURAL; INFILTRATION; INTRACAUDAL; PERINEURAL at 11:02

## 2023-02-27 RX ADMIN — Medication 20 MG: at 11:02

## 2023-02-27 RX ADMIN — SODIUM CHLORIDE: 9 INJECTION, SOLUTION INTRAVENOUS at 09:02

## 2023-02-27 RX ADMIN — SODIUM CHLORIDE 0.1 MCG/KG/MIN: 9 INJECTION, SOLUTION INTRAVENOUS at 11:02

## 2023-02-27 RX ADMIN — PROTAMINE SULFATE 5 MG: 10 INJECTION, SOLUTION INTRAVENOUS at 12:02

## 2023-02-27 RX ADMIN — Medication 100 MCG/KG/MIN: at 11:02

## 2023-02-27 RX ADMIN — CHLORTHALIDONE 25 MG: 25 TABLET ORAL at 03:02

## 2023-02-27 RX ADMIN — FENTANYL CITRATE 100 MCG: 50 INJECTION, SOLUTION INTRAMUSCULAR; INTRAVENOUS at 11:02

## 2023-02-27 RX ADMIN — MORPHINE SULFATE 3 MG: 2 INJECTION, SOLUTION INTRAMUSCULAR; INTRAVENOUS at 03:02

## 2023-02-27 RX ADMIN — PROPOFOL 100 MG: 10 INJECTION, EMULSION INTRAVENOUS at 11:02

## 2023-02-27 RX ADMIN — HEPARIN SODIUM 7000 UNITS: 1000 INJECTION, SOLUTION INTRAVENOUS; SUBCUTANEOUS at 12:02

## 2023-02-27 RX ADMIN — INSULIN DETEMIR 10 UNITS: 100 INJECTION, SOLUTION SUBCUTANEOUS at 01:02

## 2023-02-27 RX ADMIN — LABETALOL HYDROCHLORIDE 5 MG: 5 INJECTION, SOLUTION INTRAVENOUS at 02:02

## 2023-02-27 RX ADMIN — NICARDIPINE HYDROCHLORIDE 2.5 MG/HR: 0.2 INJECTION, SOLUTION INTRAVENOUS at 03:02

## 2023-02-27 RX ADMIN — HYDROCODONE BITARTRATE AND ACETAMINOPHEN 1 TABLET: 5; 325 TABLET ORAL at 09:02

## 2023-02-27 RX ADMIN — DEXAMETHASONE SODIUM PHOSPHATE 8 MG: 4 INJECTION, SOLUTION INTRAMUSCULAR; INTRAVENOUS at 11:02

## 2023-02-27 RX ADMIN — MIDAZOLAM HYDROCHLORIDE 2 MG: 5 INJECTION, SOLUTION INTRAMUSCULAR; INTRAVENOUS at 11:02

## 2023-02-27 RX ADMIN — HYDRALAZINE HYDROCHLORIDE 10 MG: 20 INJECTION, SOLUTION INTRAMUSCULAR; INTRAVENOUS at 01:02

## 2023-02-27 NOTE — ANESTHESIA PREPROCEDURE EVALUATION
[facility]  Anesthesia Pre-Operative Evaluation         Patient Name: Erin Hahn  YOB: 1955  MRN: [id number]    SUBJECTIVE:     Pre-operative evaluation for Procedure(s) (LRB):  ENDARTERECTOMY-CAROTID (Right)     02/27/2023    Erin Hahn is a 67 y.o. female w/ a significant PMHx of  insulin-dependent diabetes and CKD 3A, TIA    TTE   7/2021   The left ventricle is normal in size with normal systolic function.   The estimated ejection fraction is 65%.   Grade I left ventricular diastolic dysfunction.   Normal right ventricular size with normal right ventricular systolic function.   Mild mitral regurgitation.   Mild tricuspid regurgitation.   The estimated PA systolic pressure is 24 mmHg.   Normal central venous pressure (3 mmHg).      Patient now presents for the above procedure(s).      LDA: None documented.       Prev airway: None documented.    Drips: None documented.      Patient Active Problem List   Diagnosis    Essential hypertension    CVD (cerebrovascular disease)    Atypical chest pain    Hyperlipidemia    Hemispheric carotid artery syndrome    Type 2 diabetes mellitus with other specified complication    History of transient ischemic attack (TIA)    Bilateral carotid artery stenosis    Stage 3a chronic kidney disease       Review of patient's allergies indicates:   Allergen Reactions    Atorvastatin Other (See Comments)     myalgias         Current Inpatient Medications:      No current facility-administered medications on file prior to encounter.     Current Outpatient Medications on File Prior to Encounter   Medication Sig Dispense Refill    aspirin (ECOTRIN) 325 MG EC tablet Take 1 tablet (325 mg total) by mouth once daily. (Patient taking differently: Take 325 mg by mouth every evening.) 30 tablet 11    carvediloL (COREG) 25 MG  tablet Take 1 tablet (25 mg total) by mouth 2 (two) times daily with meals. 180 tablet 3    chlorthalidone (HYGROTEN) 25 MG Tab Take 1 tablet (25 mg total) by mouth once daily. 90 tablet 3    felodipine (PLENDIL) 10 MG 24 hr tablet Take 1 tablet (10 mg total) by mouth once daily. (Patient taking differently: Take 10 mg by mouth every evening.) 30 tablet 11    INSULIN DETEMIR (LEVEMIR SUBQ) Inject 17 Units into the skin nightly.      rosuvastatin (CRESTOR) 20 MG tablet Take 1 tablet (20 mg total) by mouth once daily. (Patient taking differently: Take 20 mg by mouth every evening.) 30 tablet 11    sitagliptan-metformin (JANUMET) 50-1,000 mg per tablet Take 1 tablet by mouth 2 (two) times daily with meals.       ascorbic acid, vitamin C, (VITAMIN C) 250 MG tablet Take 1,000 mg by mouth once daily.      b complex vitamins capsule Take 1 capsule by mouth once daily.      co-enzyme Q-10 30 mg capsule Take 30 mg by mouth 3 (three) times daily.      levocarnitine (L-CARNITINE ORAL) Take by mouth.      magnesium 250 mg Tab Take 1,500 mg by mouth.      omega-3 fatty acids/fish oil (FISH OIL-OMEGA-3 FATTY ACIDS) 300-1,000 mg capsule Take by mouth once daily.      vitamin D (VITAMIN D3) 1000 units Tab Take 1,000 Units by mouth once daily.         Past Surgical History:   Procedure Laterality Date    EYE SURGERY Left 03/2022    TONSILLECTOMY         Social History     Socioeconomic History    Marital status:    Tobacco Use    Smoking status: Never    Smokeless tobacco: Never   Substance and Sexual Activity    Alcohol use: No     Alcohol/week: 0.0 standard drinks    Drug use: No    Sexual activity: Not Currently   Social History Narrative    ** Merged History Encounter **            OBJECTIVE:     Vital Signs Range (Last 24H):         Significant Labs:  Lab Results   Component Value Date    WBC 5.68 02/02/2023    HGB 9.4 (L) 02/02/2023    HCT 31.1 (L) 02/02/2023     02/02/2023    CHOL 88 (L)  08/23/2022    TRIG 64 08/23/2022    HDL 40 08/23/2022    ALT 17 08/23/2022    AST 16 08/23/2022     02/02/2023    K 4.4 02/02/2023     02/02/2023    CREATININE 1.3 02/02/2023    BUN 26 (H) 02/02/2023    CO2 21 (L) 02/02/2023    TSH 1.061 07/03/2021    INR 1.0 07/20/2021    HGBA1C 6.7 (H) 08/23/2022       Diagnostic Studies: No relevant studies.    EKG:   Results for orders placed or performed during the hospital encounter of 02/14/23   EKG 12-lead    Collection Time: 02/14/23 10:05 AM    Narrative    Test Reason : Z01.818,    Vent. Rate : 066 BPM     Atrial Rate : 066 BPM     P-R Int : 142 ms          QRS Dur : 092 ms      QT Int : 406 ms       P-R-T Axes : 051 017 025 degrees     QTc Int : 425 ms    Sinus rhythm with occasional Premature ventricular complexes  Nonspecific T wave abnormality  Abnormal ECG  When compared with ECG of 19-AUG-2022 12:30,  Premature ventricular complexes are now Present  Confirmed by An Tinoco MD ([id number]) on 2/14/2023 12:37:40 PM    Referred By: CAPRICE LOPEZ           Confirmed By:An Tinoco MD       2D ECHO:  TTE:  Results for orders placed or performed during the hospital encounter of 07/02/21   Echo   Result Value Ref Range    Right Atrial Pressure (from IVC) 3 mmHg    LA volume (mod) 42.00 cm3    LA Volume Index (Mod) 23.9 mL/m2    BSA 1.77 m2    TDI SEPTAL 0.05 m/s    LA WIDTH 3.70 cm    Left Ventricular Outflow Tract Mean Velocity 0.55 cm/s    Left Ventricular Outflow Tract Mean Gradient 1.33 mmHg    TDI LATERAL 0.05 m/s    LVIDd 5.00 3.5 - 6.0 cm    IVS 0.90 0.6 - 1.1 cm    Posterior Wall 0.90 0.6 - 1.1 cm    LVIDs 3.50 2.1 - 4.0 cm    FS 30 28 - 44 %    LA volume 46.68 cm3    Sinus 2.80 cm    STJ 2.20 cm    Ascending aorta 2.40 cm    LV mass 158.21 g    LA size 3.30 cm    RVDD 2.80 cm    TAPSE 2.00 cm    RV S' 0 cm/s    Left Ventricle Relative Wall Thickness 0.36 cm    AV mean gradient 4 mmHg    AV valve area 2.26 cm2    AV Velocity Ratio 0.67     AV index  (prosthetic) 0.59     Mean e' 0.05 m/s    LVOT diameter 2.20 cm    LVOT area 3.8 cm2    LVOT peak shay 0.8 m/s    LVOT peak VTI 16.30 cm    Ao peak shay 1.2 m/s    Ao VTI 27.40 cm    LVOT stroke volume 61.93 cm3    AV peak gradient 6 mmHg    TV rest pulmonary artery pressure 24 mmHg    TR Max Shay 2.27 m/s    LA Volume Index 26.5 mL/m2    LV Mass Index 90 g/m2    Left Atrium Minor Axis 4.60 cm    Left Atrium Major Axis 4.40 cm    Triscuspid Valve Regurgitation Peak Gradient 21 mmHg    EF 65 %    Narrative    · The left ventricle is normal in size with normal systolic function.  · The estimated ejection fraction is 65%.  · Grade I left ventricular diastolic dysfunction.  · Normal right ventricular size with normal right ventricular systolic   function.  · Mild mitral regurgitation.  · Mild tricuspid regurgitation.  · The estimated PA systolic pressure is 24 mmHg.  · Normal central venous pressure (3 mmHg).          THOMAS:  No results found for this or any previous visit.    ASSESSMENT/PLAN:           Pre-op Assessment    I have reviewed the Patient Summary Reports.     I have reviewed the Nursing Notes.    I have reviewed the Medications.     Review of Systems  Anesthesia Hx:  Denies Family Hx of Anesthesia complications.   Denies Personal Hx of Anesthesia complications.       Physical Exam  General: Well nourished, Alert, Cooperative and Oriented    Airway:  Mallampati: II   Mouth Opening: Normal  TM Distance: Normal  Tongue: Normal  Neck ROM: Normal ROM        Anesthesia Plan  Type of Anesthesia, risks & benefits discussed:    Anesthesia Type: Gen ETT  Intra-op Monitoring Plan: Standard ASA Monitors  Post Op Pain Control Plan: multimodal analgesia and IV/PO Opioids PRN  Induction:  IV  ASA Score: 3  Day of Surgery Review of History & Physical: H&P Update referred to the surgeon/provider.    Ready For Surgery From Anesthesia Perspective.     .

## 2023-02-27 NOTE — PROGRESS NOTES
Vascular at bedside. Per Kleber YEUNG, okay to titrate cardene gtt to BP cuff rather than to arterial line. Art line reading 134/57 (79), BP cuff reading 105/58 (78). Pt w/o s/s distress, denies pain, is sitting up in bed talking with family, asking for food. WCTM.

## 2023-02-27 NOTE — BRIEF OP NOTE
[facility] - [facility] (2nd Fl)  Brief Operative Note    SUMMARY     Surgery Date: 2/27/2023     Surgeon(s) and Role:     * CAPRICE Martinez III, MD - Primary     * Kelvin Mcknight MD - Resident - Assisting     * Juan Manuel Shahid MD - Fellow    Pre-op Diagnosis:  Carotid artery stenosis, unilateral, right [I65.21], > 80%, progressive, hx of TIA    Post op Dx: same    Procedure(s) (LRB):  ENDARTERECTOMY-CAROTID (Right) with bovine patch    Anesthesia: General    Operative Findings: Focal high grade ostial ICA stenosis    Estimated Blood Loss: 40cc          Specimens:   Specimen (24h ago, onward)      None            [id number]

## 2023-02-27 NOTE — TRANSFER OF CARE
"Anesthesia Transfer of Care Note    Patient: Erin Hahn    Procedure(s) Performed: Procedure(s) (LRB):  ENDARTERECTOMY-CAROTID (Right)    Patient location: PACU    Anesthesia Type: general    Transport from OR: Transported from OR on 6-10 L/min O2 by face mask with adequate spontaneous ventilation    Post pain: adequate analgesia    Post assessment: no apparent anesthetic complications    Post vital signs: stable    Level of consciousness: sedated    Nausea/Vomiting: no nausea/vomiting    Complications: none    Transfer of care protocol was followed      Last vitals:   Visit Vitals  /67 (BP Location: Right arm, Patient Position: Lying)   Pulse 72   Temp 36.4 °C (97.5 °F) (Temporal)   Resp 18   Ht 5' 6" (1.676 m)   Wt 67.6 kg (149 lb)   SpO2 99%   Breastfeeding No   BMI 24.05 kg/m²     "

## 2023-02-27 NOTE — ANESTHESIA PROCEDURE NOTES
Intubation    Date/Time: 2/27/2023 11:34 AM  Performed by: Girish Waggoner MD  Authorized by: Cortes Gudino MD     Intubation:     Induction:  Intravenous    Intubated:  Postinduction    Mask Ventilation:  Easy with oral airway    Attempts:  1    Attempted By:  Resident anesthesiologist    Method of Intubation:  Video laryngoscopy    Blade:  Townsend 3    Laryngeal View Grade: Grade I - full view of cords      Difficult Airway Encountered?: No      Complications:  None    Airway Device:  Oral endotracheal tube    Airway Device Size:  7.0    Style/Cuff Inflation:  Cuffed    Inflation Amount (mL):  5    Tube secured:  22    Secured at:  The lips    Placement Verified By:  Capnometry and Revisualization with laryngoscopy    Complicating Factors:  None    Findings Post-Intubation:  BS equal bilateral

## 2023-02-28 VITALS
OXYGEN SATURATION: 93 % | SYSTOLIC BLOOD PRESSURE: 127 MMHG | TEMPERATURE: 98 F | WEIGHT: 149 LBS | HEART RATE: 72 BPM | DIASTOLIC BLOOD PRESSURE: 61 MMHG | HEIGHT: 66 IN | RESPIRATION RATE: 16 BRPM | BODY MASS INDEX: 23.95 KG/M2

## 2023-02-28 LAB
ANION GAP SERPL CALC-SCNC: 9 MMOL/L (ref 8–16)
BASOPHILS # BLD AUTO: 0.02 K/UL (ref 0–0.2)
BASOPHILS NFR BLD: 0.2 % (ref 0–1.9)
BUN SERPL-MCNC: 25 MG/DL (ref 8–23)
CALCIUM SERPL-MCNC: 9.4 MG/DL (ref 8.7–10.5)
CHLORIDE SERPL-SCNC: 109 MMOL/L (ref 95–110)
CO2 SERPL-SCNC: 19 MMOL/L (ref 23–29)
CREAT SERPL-MCNC: 1.2 MG/DL (ref 0.5–1.4)
DIFFERENTIAL METHOD: ABNORMAL
EOSINOPHIL # BLD AUTO: 0 K/UL (ref 0–0.5)
EOSINOPHIL NFR BLD: 0 % (ref 0–8)
ERYTHROCYTE [DISTWIDTH] IN BLOOD BY AUTOMATED COUNT: 13.2 % (ref 11.5–14.5)
EST. GFR  (NO RACE VARIABLE): 49.9 ML/MIN/1.73 M^2
GLUCOSE SERPL-MCNC: 261 MG/DL (ref 70–110)
HCT VFR BLD AUTO: 29.9 % (ref 37–48.5)
HGB BLD-MCNC: 9.1 G/DL (ref 12–16)
IMM GRANULOCYTES # BLD AUTO: 0.04 K/UL (ref 0–0.04)
IMM GRANULOCYTES NFR BLD AUTO: 0.4 % (ref 0–0.5)
LYMPHOCYTES # BLD AUTO: 1.1 K/UL (ref 1–4.8)
LYMPHOCYTES NFR BLD: 11.6 % (ref 18–48)
MCH RBC QN AUTO: 27.8 PG (ref 27–31)
MCHC RBC AUTO-ENTMCNC: 30.4 G/DL (ref 32–36)
MCV RBC AUTO: 91 FL (ref 82–98)
MONOCYTES # BLD AUTO: 0.8 K/UL (ref 0.3–1)
MONOCYTES NFR BLD: 8.2 % (ref 4–15)
NEUTROPHILS # BLD AUTO: 7.4 K/UL (ref 1.8–7.7)
NEUTROPHILS NFR BLD: 79.6 % (ref 38–73)
NRBC BLD-RTO: 0 /100 WBC
PLATELET # BLD AUTO: 204 K/UL (ref 150–450)
PMV BLD AUTO: 10.9 FL (ref 9.2–12.9)
POCT GLUCOSE: 228 MG/DL (ref 70–110)
POTASSIUM SERPL-SCNC: 5.1 MMOL/L (ref 3.5–5.1)
RBC # BLD AUTO: 3.27 M/UL (ref 4–5.4)
SODIUM SERPL-SCNC: 137 MMOL/L (ref 136–145)
WBC # BLD AUTO: 9.34 K/UL (ref 3.9–12.7)

## 2023-02-28 PROCEDURE — 80048 BASIC METABOLIC PNL TOTAL CA: CPT | Performed by: STUDENT IN AN ORGANIZED HEALTH CARE EDUCATION/TRAINING PROGRAM

## 2023-02-28 PROCEDURE — 94799 UNLISTED PULMONARY SVC/PX: CPT

## 2023-02-28 PROCEDURE — 63600175 PHARM REV CODE 636 W HCPCS: Performed by: STUDENT IN AN ORGANIZED HEALTH CARE EDUCATION/TRAINING PROGRAM

## 2023-02-28 PROCEDURE — 85025 COMPLETE CBC W/AUTO DIFF WBC: CPT | Performed by: STUDENT IN AN ORGANIZED HEALTH CARE EDUCATION/TRAINING PROGRAM

## 2023-02-28 PROCEDURE — 36415 COLL VENOUS BLD VENIPUNCTURE: CPT | Performed by: STUDENT IN AN ORGANIZED HEALTH CARE EDUCATION/TRAINING PROGRAM

## 2023-02-28 PROCEDURE — 94761 N-INVAS EAR/PLS OXIMETRY MLT: CPT

## 2023-02-28 PROCEDURE — 25000003 PHARM REV CODE 250: Performed by: STUDENT IN AN ORGANIZED HEALTH CARE EDUCATION/TRAINING PROGRAM

## 2023-02-28 RX ORDER — HYDROCODONE BITARTRATE AND ACETAMINOPHEN 5; 325 MG/1; MG/1
1 TABLET ORAL EVERY 6 HOURS PRN
Qty: 15 TABLET | Refills: 0 | Status: SHIPPED | OUTPATIENT
Start: 2023-02-28 | End: 2024-02-06

## 2023-02-28 RX ORDER — PRAVASTATIN SODIUM 80 MG/1
80 TABLET ORAL DAILY
Qty: 90 TABLET | Refills: 3 | Status: SHIPPED | OUTPATIENT
Start: 2023-02-28 | End: 2024-03-21 | Stop reason: SDUPTHER

## 2023-02-28 RX ADMIN — PRAVASTATIN SODIUM 80 MG: 20 TABLET ORAL at 08:02

## 2023-02-28 RX ADMIN — INSULIN ASPART 2 UNITS: 100 INJECTION, SOLUTION INTRAVENOUS; SUBCUTANEOUS at 09:02

## 2023-02-28 RX ADMIN — ASPIRIN 325 MG: 325 TABLET, COATED ORAL at 08:02

## 2023-02-28 RX ADMIN — HYDROCODONE BITARTRATE AND ACETAMINOPHEN 1 TABLET: 5; 325 TABLET ORAL at 11:02

## 2023-02-28 RX ADMIN — MUPIROCIN: 20 OINTMENT TOPICAL at 09:02

## 2023-02-28 RX ADMIN — CARVEDILOL 25 MG: 25 TABLET, FILM COATED ORAL at 09:02

## 2023-02-28 RX ADMIN — INSULIN DETEMIR 10 UNITS: 100 INJECTION, SOLUTION SUBCUTANEOUS at 09:02

## 2023-02-28 RX ADMIN — HYDROCODONE BITARTRATE AND ACETAMINOPHEN 1 TABLET: 5; 325 TABLET ORAL at 05:02

## 2023-02-28 RX ADMIN — CHLORTHALIDONE 25 MG: 25 TABLET ORAL at 08:02

## 2023-02-28 RX ADMIN — CEFAZOLIN 2 G: 2 INJECTION, POWDER, FOR SOLUTION INTRAMUSCULAR; INTRAVENOUS at 04:02

## 2023-02-28 NOTE — ANESTHESIA POSTPROCEDURE EVALUATION
Anesthesia Post Evaluation    Patient: Erin Hahn    Procedure(s) Performed: Procedure(s) (LRB):  ENDARTERECTOMY-CAROTID (Right)    Final Anesthesia Type: general      Patient location during evaluation: PACU  Patient participation: Yes- Able to Participate  Level of consciousness: awake and alert, awake and oriented  Post-procedure vital signs: reviewed and stable  Pain management: adequate  Airway patency: patent    PONV status at discharge: No PONV  Anesthetic complications: no      Cardiovascular status: blood pressure returned to baseline, hemodynamically stable and stable  Respiratory status: unassisted, spontaneous ventilation and room air  Hydration status: euvolemic  Follow-up not needed.          Vitals Value Taken Time   /61 02/28/23 1059   Temp 36.4 °C (97.5 °F) 02/28/23 1059   Pulse 72 02/28/23 1059   Resp 16 02/28/23 1107   SpO2 93 % 02/28/23 1059         Event Time   Out of Recovery 14:45:00         Pain/Laura Score: Pain Rating Prior to Med Admin: 6 (2/28/2023 11:07 AM)  Pain Rating Post Med Admin: 0 (2/28/2023  8:40 AM)  Laura Score: 9 (2/27/2023  2:45 PM)

## 2023-02-28 NOTE — NURSING
AVS reviewed with patient and family, VSS, medications delivered to patient at bedside, and IV removed. Questions encouraged and answered prior to discharge. Transport ordered for patient.

## 2023-02-28 NOTE — HOSPITAL COURSE
Please see the preoperative H&P and other available documentation for full details related to history prior to this admission.  Briefly, Erin Hahn is a 67 y.o. female who was admitted following scheduled elective surgery for right carotid stenosis.      Following a complete preoperative discussion of the risks and benefits of surgery with signed informed consent, the patient was taken to the operating room on 2/27/23 and underwent the above stated procedures. The patient tolerated surgery well and there were no complications. Please see the operative report for full intraoperative findings and details.       Postoperatively, the patient did well and was transferred from the PACU to the floor in stable condition where they had a stable and uncomplicated hospital course.      Labs and vital signs remained stable and appropriate throughout course. Diet was advanced as tolerated and the patient's pain was controlled on oral pain medications without problem. Currently, the patient is doing well and is stable and appropriate for discharge at this time. Discharge instructions discussed with patient at bedside, all questions and concerns addressed.

## 2023-02-28 NOTE — OP NOTE
Surgery Date: 2/27/2023      Surgeon(s) and Role:     * CAPRICE Martinez III, MD - Primary     * Kelvin Mcknight MD - Resident - Assisting     * Juan Manuel Shahid MD - Fellow     Pre-op Diagnosis:  Carotid artery stenosis, unilateral, right [I65.21], > 80%, progressive, hx of TIA     Post op Dx: same     Procedure(s) (LRB):  ENDARTERECTOMY-CAROTID (Right) with bovine patch     Anesthesia: General     Operative Findings: Focal high grade ostial ICA stenosis     Estimated Blood Loss: 40cc           Specimens:   Specimen (24h ago, onward)        None             Description of procedure:     The patient is a 67-year-old female who suffered a right MCA TIA.  After diagnostic workup, it was discovered that he had a carotid lesion of her right carotid bulb and ICA. The patient was back to her baseline function per her own estimation.  Given the high risk lesion in the right carotid, she was scheduled for right carotid endarterectomy.      Patient was identified and brought to the operating room.  After general endotracheal anesthesia was established, he was prepped and draped in standard sterile fashion after beach chair positioning.  After a team wide time out during which the procedure and laterality were agreed upon by all operating room staff, a curvilinear incision was made on the anterior border of the stent sternocleidomastoid angled back toward the mastoid itself.  The patient is a slender individual with very little subcutaneous fat.  The platysma was divided and the sternocleidomastoid muscle and internal jugular veins were mobilized laterally.  All medially going branches of the IJ were ligated and divided.  The common carotid artery was identified dissected free from surrounding tissue and circled with an umbilical tape.  The vagus nerve was identified in its normal posterior course in the carotid sheath and kept out of harm's way throughout the procedure.  Mobilization of the carotid was continued to include the  proximal and mid ICA.  The hypoglossal nerve was visualized in the superior field and not disturbed.  The proximal external carotid artery was mobilized and surrounded with a vessel tape.     The patient was systemically anticoagulated with heparin.  After confirmation of an ACT value 250 or greater, the ICA was clamped followed by clamping of the common carotid artery and external carotid artery.  An 11 blade was used to make an arteriotomy in the distal CCA which was extended through the bulb and onto the ICA.  This was extended with Awad scissors.  Friable heavily calcified plaque was visualized in the carotid bulb with posterior extension into the mid cervical ICA.  The ICA clamp was flashed and there was a differential and pulsatile backbleeding from the ICA.  Given the large size of the ICA and the pulsatile backbleeding, no shunt was used.  The blood pressure was elevated to the 170 systolic therefore.      [place] and [place] elevator were used to mobilize the plaque.  Small pieces of the plaque were debrided from the inside of the artery with fine forceps under heparinized saline irrigation.  The distal endpoint feathered without issue.  The external carotid artery underwent eversion endarterectomy.  Satisfied with our endarterectomy plane, a bovine pericardial patch which had been brought onto the field previously was sewn in circumferential fashion with a 6 0 Prolene suture onto the internal and common carotid arteries.  Prior to completion of the suture line the ICA ECA and CCA were all flushed in standard order.  Inking after the suture line was completed and tied, the ICA was transiently occluded with a fingers while antegrade flow through the CCA was reestablished and the ECA was opened.      After several systolic cycles the ICA was then opened.      The heparin was reversed with protamine.  The wound was packed with Surgicel and thrombin-soaked Gelfoam for several minutes.  After removal of the  hemostatic material several clips were applied to small bleeding veins in the field.  The wound was irrigated and noted to be completely hemostatic.      The wound was closed in layers with 3-0, Vicryl sutures and the skin was closed with 4-0 monocryl.  The patient was awakened and totally stable condition with baseline neurologic function, moving all 4 extremities with a normal cranial nerve exam.        Juan Manuel Shahid MD PGY7  Vascular Surgery Fellow  [phone]

## 2023-02-28 NOTE — NURSING TRANSFER
Nursing Transfer Note      2/27/2023     Reason patient is being transferred: post op    Transfer To: [place] [id number]    Transfer via stretcher    Transfer with 1L NC    Transported by transporter x1    Medicines sent: levemir pen    Any special needs or follow-up needed: routine    Chart send with patient: Yes    Notified: family at bedside    Patient reassessed at: 02/27/2023 at 2000

## 2023-02-28 NOTE — DISCHARGE SUMMARY
[facility]  Vascular Surgery  Discharge Summary      Patient Name: Erin Hahn  MRN: [id number]  Admission Date: 2/27/2023  Hospital Length of Stay: 1 days  Discharge Date and Time:  02/28/2023 6:53 AM  Attending Physician: CAPRICE Martinez III, MD   Discharging Provider: Lety Avalos MD  Primary Care Provider: Shyann Peraza MD    HPI:    Erin Hahn is a 67 y.o. female, right-handed, with insulin-dependent diabetes and CKD 3A, who had a right hemispheric TIA in July 2021 as manifested by 10 minutes of left arm weakness.  She would had no episodes of stroke TIA or amaurosis before after this.  At this time she was on aspirin and statin this has been maintained.  She was subsequently evaluated for a right carotid stent with Dr. Black, but she ultimately declined this procedure.     Notably, the CTA performed that time and suggested a 50-60% carotid stenosis.  Recent carotid duplex shows progression of her right carotid stenosis to greater than 80%.  She is had no further episodes of stroke TIA or amaurosis since.     She states compliance with her aspirin and statin.     She is no history of MI or shortness of breath.  She had a 2D echo at [facility] recently with normal ejection fraction    Procedure(s) (LRB):  ENDARTERECTOMY-CAROTID (Right)     Hospital Course: Please see the preoperative H&P and other available documentation for full details related to history prior to this admission.  Briefly, Erin Hahn is a 67 y.o. female who was admitted following scheduled elective surgery for right carotid stenosis.      Following a complete preoperative discussion of the risks and benefits of surgery with signed informed consent, the patient was taken to the operating room on 2/27/23 and underwent the above stated procedures. The patient tolerated surgery well and there were no complications. Please see the operative report for full intraoperative findings and details.       Postoperatively, the  patient did well and was transferred from the PACU to the floor in stable condition where they had a stable and uncomplicated hospital course.      Labs and vital signs remained stable and appropriate throughout course. Diet was advanced as tolerated and the patient's pain was controlled on oral pain medications without problem. Currently, the patient is doing well and is stable and appropriate for discharge at this time. Discharge instructions discussed with patient at bedside, all questions and concerns addressed.          Goals of Care Treatment Preferences:  Code Status: Full Code      Consults:     Significant Diagnostic Studies: Labs:   CMP   Recent Labs   Lab 02/27/23  0910      K 4.4      CO2 22*   *   BUN 26*   CREATININE 1.3   CALCIUM 10.1   ANIONGAP 8    and CBC   Recent Labs   Lab 02/27/23 0910   WBC 6.31   HGB 9.4*   HCT 30.7*          Pending Diagnostic Studies:       Procedure Component Value Units Date/Time    Basic metabolic panel [[id number]] Collected: 02/28/23 0248    Order Status: Sent Lab Status: In process Updated: 02/28/23 0630    Specimen: Blood     CBC auto differential [[id number]] Collected: 02/28/23 0248    Order Status: Sent Lab Status: In process Updated: 02/28/23 0630    Specimen: Blood           Final Active Diagnoses:    Diagnosis Date Noted POA    PRINCIPAL PROBLEM:  Bilateral carotid artery stenosis [I65.23] 07/03/2021 Yes    Stage 3a chronic kidney disease [N18.31] 11/03/2021 Yes    History of transient ischemic attack (TIA) [Z86.73] 07/02/2021 Not Applicable    Type 2 diabetes mellitus with other specified complication [E11.69] 02/06/2018 Yes    Essential hypertension [I10] 10/29/2015 Yes      Problems Resolved During this Admission:      Discharged Condition: good    Disposition: Home or Self Care    Follow Up:   Follow-up Information       W Chad Martinez Iii, MD Follow up in 4 week(s).    Specialty: Vascular Surgery  Contact information:  [id number]  [facility] [address]  [phone]                             Patient Instructions:      Notify your health care provider if you experience any of the following:  temperature >100.4     Notify your health care provider if you experience any of the following:  persistent nausea and vomiting or diarrhea     Notify your health care provider if you experience any of the following:  severe uncontrolled pain     Notify your health care provider if you experience any of the following:  redness, tenderness, or signs of infection (pain, swelling, redness, odor or green/yellow discharge around incision site)     Notify your health care provider if you experience any of the following:  difficulty breathing or increased cough     Notify your health care provider if you experience any of the following:  severe persistent headache     Notify your health care provider if you experience any of the following:  worsening rash     Notify your health care provider if you experience any of the following:  persistent dizziness, light-headedness, or visual disturbances     Notify your health care provider if you experience any of the following:  increased confusion or weakness     No dressing needed   Order Comments: You may remove dressing in 24 hours. Keep area clean and dry. You are OK to shower in 24 hours, do not soak or submerge in water - no swimming or baths. Shower daily.     Activity as tolerated     Medications:  Reconciled Home Medications:      Medication List        START taking these medications      HYDROcodone-acetaminophen 5-325 mg per tablet  Commonly known as: NORCO  Take 1 tablet by mouth every 6 (six) hours as needed for Pain.     pravastatin 80 MG tablet  Commonly known as: PRAVACHOL  Take 1 tablet (80 mg total) by mouth once daily.            CHANGE how you take these medications      aspirin 325 MG EC tablet  Commonly known as: ECOTRIN  Take 1 tablet (325 mg total) by mouth once daily.  What  changed: when to take this     felodipine 10 MG 24 hr tablet  Commonly known as: PLENDIL  Take 1 tablet (10 mg total) by mouth once daily.  What changed: when to take this     rosuvastatin 20 MG tablet  Commonly known as: CRESTOR  Take 1 tablet (20 mg total) by mouth once daily.  What changed: when to take this            CONTINUE taking these medications      ascorbic acid (vitamin C) 250 MG tablet  Commonly known as: VITAMIN C  Take 1,000 mg by mouth once daily.     b complex vitamins capsule  Take 1 capsule by mouth once daily.     carvediloL 25 MG tablet  Commonly known as: COREG  Take 1 tablet (25 mg total) by mouth 2 (two) times daily with meals.     chlorthalidone 25 MG Tab  Commonly known as: HYGROTEN  Take 1 tablet (25 mg total) by mouth once daily.     co-enzyme Q-10 30 mg capsule  Take 30 mg by mouth 3 (three) times daily.     fish oil-omega-3 fatty acids 300-1,000 mg capsule  Take by mouth once daily.     L-CARNITINE ORAL  Take by mouth.     LEVEMIR SUBQ  Inject 17 Units into the skin nightly.     magnesium 250 mg Tab  Take 1,500 mg by mouth.     SITagliptan-metformin 50-1,000 mg per tablet  Commonly known as: JANUMET  Take 1 tablet by mouth 2 (two) times daily with meals.     vitamin D 1000 units Tab  Commonly known as: VITAMIN D3  Take 1,000 Units by mouth once daily.              Lety Avalos MD  Vascular Surgery  [facility]

## 2023-02-28 NOTE — PLAN OF CARE
Patient AAO X 4 lying in bed with respiratory unlabored. Daughter at bedside. No SOB/N/V. Surgical incision to neck with gauze. C/D/I. SCD's in place. Patient ambulated to restroom X 1 assist. Adequate urine output and no BM's overnight. Bed in lowest position with call light at bedside. No distress noted.     Problem: Adult Inpatient Plan of Care  Goal: Plan of Care Review  Outcome: Ongoing, Progressing  Goal: Patient-Specific Goal (Individualized)  Outcome: Ongoing, Progressing  Goal: Absence of Hospital-Acquired Illness or Injury  Outcome: Ongoing, Progressing  Goal: Optimal Comfort and Wellbeing  Outcome: Ongoing, Progressing  Goal: Readiness for Transition of Care  Outcome: Ongoing, Progressing     Problem: Diabetes Comorbidity  Goal: Blood Glucose Level Within Targeted Range  Outcome: Ongoing, Progressing

## 2023-02-28 NOTE — PLAN OF CARE
[facility] - [facility]  Discharge Final Note    Primary Care Provider: Shyann Peraza MD    Expected Discharge Date: 2/28/2023    Final Discharge Note (most recent)       Final Note - 02/28/23 0924          Final Note    Assessment Type Final Discharge Note     Anticipated Discharge Disposition Home or Self Care     Hospital Resources/Appts/Education Provided Appointments scheduled and added to AVS        Post-Acute Status    Post-Acute Authorization Other     Other Status No Post-Acute Service Needs                   Contact Info       W Chad Martinez Iii, MD   Specialty: Vascular Surgery    [facility] [facility] [address]   Phone: [phone]       Next Steps: Follow up on 3/28/2023    Instructions: SURGICAL FOLLOW UP at 8AM          Nuzhat Barron RN, CM   Ext: [phone]

## 2023-03-01 ENCOUNTER — TELEPHONE (OUTPATIENT)
Dept: VASCULAR SURGERY | Facility: CLINIC | Age: 68
End: 2023-03-01
Payer: MEDICARE

## 2023-03-01 ENCOUNTER — NURSE TRIAGE (OUTPATIENT)
Dept: ADMINISTRATIVE | Facility: CLINIC | Age: 68
End: 2023-03-01
Payer: MEDICARE

## 2023-03-01 NOTE — TELEPHONE ENCOUNTER
PD Day 1: R CEA procedure    C/o intermittent headache & pressure since before procedure, says 2/18 she was jumping rope & fell, hit head, denies loc. Had improved & almost resolved, returned yesterday. States was advised in PACU to monitor for headaches & call in if experienced at any time. Currently rates headache 4-5/10.     Spoke with Nancy pt navigator. Notified of symptoms & dispo to transfer to clinic now-she sent message to clinic, says should receive call within 4 hrs.  Pt updated & advised on callback symptoms. Pt vu.     Reason for Disposition   Caller has URGENT question and triager unable to answer question    Additional Information   Negative: Difficult to awaken or acting confused (e.g., disoriented, slurred speech)   Negative: Weakness of the face, arm or leg on one side of the body and new-onset   Negative: Numbness of the face, arm or leg on one side of the body and new-onset   Negative: Loss of speech or garbled speech and new-onset   Negative: Passed out (i.e., fainted, collapsed and was not responding)   Negative: Sounds like a life-threatening emergency to the triager   Negative: Unable to walk without falling   Negative: Stiff neck (can't touch chin to chest)   Negative: Possibility of carbon monoxide exposure   Negative: SEVERE headache, states 'worst headache' of life   Negative: SEVERE headache, sudden-onset (i.e., reaching maximum intensity within seconds to 1 hour)   Negative: Severe pain in one eye   Negative: Loss of vision or double vision  (Exception: Same as prior migraines.)   Negative: Patient sounds very sick or weak to the triager   Negative: Fever > 103 F (39.4 C)   Negative: Fever > 100.0 F (37.8 C) and has diabetes mellitus or a weak immune system (e.g., HIV positive, cancer chemotherapy, organ transplant, splenectomy, chronic steroids)   Negative: SEVERE headache (e.g., excruciating) and has had severe headaches before   Negative: SEVERE headache and not relieved by pain  meds   Negative: SEVERE headache and vomiting   Negative: SEVERE headache and fever   Negative: New headache and weak immune system (e.g., HIV positive, cancer chemo, splenectomy, organ transplant, chronic steroids)   Negative: Fever present > 3 days (72 hours)   Negative: Patient wants to be seen   Negative: Sounds like a life-threatening emergency to the triager   Negative: Bright red, wide-spread, sunburn-like rash   Negative: SEVERE headache and after spinal (epidural) anesthesia   Negative: Vomiting and persists > 4 hours   Negative: Vomiting and abdomen looks much more swollen than usual   Negative: Drinking very little and dehydration suspected (e.g., no urine > 12 hours, very dry mouth, very lightheaded)   Negative: Patient sounds very sick or weak to the triager   Negative: Sounds like a serious complication to the triager   Negative: Fever > 100.4 F (38.0 C)    Protocols used: Headache-A-OH, Post-Op Symptoms and [id number]

## 2023-03-01 NOTE — TELEPHONE ENCOUNTER
Contacted pt in response to message. States she has been experiencing headache and rates pain 4/10. States last BP was 90's systolic/60's diastolic, she is taking BP medication as prescribed, and last dose of pain medication was at approximately 0200. States she was instructed by Dr. Martinez to call for headache. After discussing with Dr. Martinez nurse carnified instructions are to call for severe headache and/or hypertension, and that normotensive BP and mild/moderate headache as described by pt can be monitored. Notified pt Dr. Martinez states she can take OTC pain reliever for headache at this time. Pt repeated instructions and verbalized understanding. Post-op appts confirmed with pt.----- Message from Nancy Sylvain sent at 3/1/2023 11:41 AM CST -----  Regarding: Appt  Contact: Pt [phone]  Symptom: Headache  Outcome: Schedule an urgent appointment (within 4 hours) or talk to a nurse or provider within 30 minutes.  Reason: Started within the past 3 days

## 2023-03-24 ENCOUNTER — TELEPHONE (OUTPATIENT)
Dept: VASCULAR SURGERY | Facility: CLINIC | Age: 68
End: 2023-03-24
Payer: MEDICARE

## 2023-03-24 NOTE — TELEPHONE ENCOUNTER
Contacted pt to reschedule FU visit with Dr. Martinez due to change in Dr. Martinez's schedule. Appointments rescheduled, pt verified. Appointment letter refused.

## 2023-04-04 ENCOUNTER — HOSPITAL ENCOUNTER (OUTPATIENT)
Dept: VASCULAR SURGERY | Facility: CLINIC | Age: 68
Discharge: HOME | End: 2023-04-04
Attending: SURGERY
Payer: MEDICARE

## 2023-04-04 ENCOUNTER — OFFICE VISIT (OUTPATIENT)
Dept: VASCULAR SURGERY | Facility: CLINIC | Age: 68
End: 2023-04-04
Payer: MEDICARE

## 2023-04-04 VITALS
WEIGHT: 147.69 LBS | DIASTOLIC BLOOD PRESSURE: 72 MMHG | HEART RATE: 67 BPM | TEMPERATURE: 98 F | SYSTOLIC BLOOD PRESSURE: 154 MMHG | BODY MASS INDEX: 23.74 KG/M2 | HEIGHT: 66 IN

## 2023-04-04 DIAGNOSIS — I65.21 CAROTID ARTERY STENOSIS, UNILATERAL, RIGHT: ICD-10-CM

## 2023-04-04 DIAGNOSIS — Z98.890 HISTORY OF RIGHT-SIDED CAROTID ENDARTERECTOMY: Primary | ICD-10-CM

## 2023-04-04 DIAGNOSIS — Z98.890 S/P CAROTID ENDARTERECTOMY: ICD-10-CM

## 2023-04-04 DIAGNOSIS — I65.22 LEFT CAROTID STENOSIS: ICD-10-CM

## 2023-04-04 DIAGNOSIS — I65.22 LEFT CAROTID STENOSIS: Primary | ICD-10-CM

## 2023-04-04 PROCEDURE — 99213 OFFICE O/P EST LOW 20 MIN: CPT | Mod: PBBFAC | Performed by: SURGERY

## 2023-04-04 PROCEDURE — 93880 PR DUPLEX SCAN EXTRACRANIAL,BILAT: ICD-10-PCS | Mod: 26,S$PBB,, | Performed by: SURGERY

## 2023-04-04 PROCEDURE — 93880 EXTRACRANIAL BILAT STUDY: CPT | Mod: PBBFAC | Performed by: SURGERY

## 2023-04-04 PROCEDURE — 99024 PR POST-OP FOLLOW-UP VISIT: ICD-10-PCS | Mod: POP,,, | Performed by: SURGERY

## 2023-04-04 PROCEDURE — 99024 POSTOP FOLLOW-UP VISIT: CPT | Mod: POP,,, | Performed by: SURGERY

## 2023-04-04 PROCEDURE — 99999 PR PBB SHADOW E&M-EST. PATIENT-LVL III: ICD-10-PCS | Mod: PBBFAC,,, | Performed by: SURGERY

## 2023-04-04 PROCEDURE — 99999 PR PBB SHADOW E&M-EST. PATIENT-LVL III: CPT | Mod: PBBFAC,,, | Performed by: SURGERY

## 2023-04-04 PROCEDURE — 93880 EXTRACRANIAL BILAT STUDY: CPT | Mod: 26,S$PBB,, | Performed by: SURGERY

## 2023-04-04 RX ORDER — ASPIRIN 81 MG/1
81 TABLET ORAL DAILY
COMMUNITY

## 2023-04-04 NOTE — PROGRESS NOTES
VASCULAR SURGERY SERVICE    REFERRING DOCTOR: Dr Lee    CHIEF COMPLAINT:  Carotid stenosis    HISTORY OF PRESENT ILLNESS: Erin Hahn is a 67 y.o. female, right-handed, with insulin-dependent diabetes and CKD 3A, who had a right hemispheric TIA in July 2021 as manifested by 10 minutes of left arm weakness.  She would had no episodes of stroke TIA or amaurosis before after this.  At this time she was on aspirin and statin this has been maintained.  She was subsequently evaluated for a right carotid stent with Dr. Black, but she ultimately declined this procedure.    Notably, the CTA performed that time and suggested a 50-60% carotid stenosis.  Recent carotid duplex shows progression of her right carotid stenosis to greater than 80%.  She is had no further episodes of stroke TIA or amaurosis since.    She states compliance with her aspirin and statin.    She is no history of MI or shortness of breath.  She had a 2D echo at [facility] with normal ejection fraction    4/4/2023:  This is her initial postoperative visit status post right carotid endarterectomy 02/27/2023.  She would an unremarkable postop course was discharged on the 1st postoperative day.  She now returns.  Denies interval stroke TIA or amaurosis.  Notably she is taking aspirin 325 mg as well as her statin    Past Medical History:   Diagnosis Date    Carotid artery bruit     Diabetes mellitus     High cholesterol     Hypertension     Stage 3a chronic kidney disease 11/3/2021    Stroke 07/2021    TIA       Past Surgical History:   Procedure Laterality Date    CAROTID ENDARTERECTOMY Right 2/27/2023    Procedure: ENDARTERECTOMY-CAROTID;  Surgeon: CAPRICE Martinez III, MD;  Location: [facility] OR [address] [place] [address];  Service: Peripheral Vascular;  Laterality: Right;    EYE SURGERY Left 03/2022    TONSILLECTOMY           Current Outpatient Medications:     ascorbic acid, vitamin C, (VITAMIN C) 250 MG tablet, Take 1,000 mg by mouth once daily., Disp: , Rfl:      aspirin (ECOTRIN) 325 MG EC tablet, Take 1 tablet (325 mg total) by mouth once daily. (Patient taking differently: Take 325 mg by mouth every evening.), Disp: 30 tablet, Rfl: 11    b complex vitamins capsule, Take 1 capsule by mouth once daily., Disp: , Rfl:     carvediloL (COREG) 25 MG tablet, Take 1 tablet (25 mg total) by mouth 2 (two) times daily with meals., Disp: 180 tablet, Rfl: 3    chlorthalidone (HYGROTEN) 25 MG Tab, TAKE 1 TABLET BY MOUTH EVERY DAY, Disp: 90 tablet, Rfl: 3    co-enzyme Q-10 30 mg capsule, Take 30 mg by mouth 3 (three) times daily., Disp: , Rfl:     HYDROcodone-acetaminophen (NORCO) 5-325 mg per tablet, Take 1 tablet by mouth every 6 (six) hours as needed for Pain., Disp: 15 tablet, Rfl: 0    INSULIN DETEMIR (LEVEMIR SUBQ), Inject 17 Units into the skin nightly., Disp: , Rfl:     levocarnitine (L-CARNITINE ORAL), Take by mouth., Disp: , Rfl:     magnesium 250 mg Tab, Take 1,500 mg by mouth., Disp: , Rfl:     omega-3 fatty acids/fish oil (FISH OIL-OMEGA-3 FATTY ACIDS) 300-1,000 mg capsule, Take by mouth once daily., Disp: , Rfl:     pravastatin (PRAVACHOL) 80 MG tablet, Take 1 tablet (80 mg total) by mouth once daily., Disp: 90 tablet, Rfl: 3    sitagliptan-metformin (JANUMET) 50-1,000 mg per tablet, Take 1 tablet by mouth 2 (two) times daily with meals. , Disp: , Rfl:     vitamin D (VITAMIN D3) 1000 units Tab, Take 1,000 Units by mouth once daily., Disp: , Rfl:     felodipine (PLENDIL) 10 MG 24 hr tablet, Take 1 tablet (10 mg total) by mouth once daily. (Patient taking differently: Take 10 mg by mouth every evening.), Disp: 30 tablet, Rfl: 11    Review of patient's allergies indicates:   Allergen Reactions    Atorvastatin Other (See Comments)     myalgias         Family History   Problem Relation Age of Onset    Diabetes Mother     Hepatitis Mother     No Known Problems Father     No Known Problems Sister     No Known Problems Brother     COPD Maternal Aunt     No Known Problems Maternal  "Uncle     No Known Problems Paternal Aunt     No Known Problems Paternal Uncle     Diabetes Maternal Grandmother     No Known Problems Maternal Grandfather     No Known Problems Paternal Grandmother     No Known Problems Paternal Grandfather     Anemia Neg Hx     Arrhythmia Neg Hx     Asthma Neg Hx     Clotting disorder Neg Hx     Fainting Neg Hx     Heart attack Neg Hx     Heart disease Neg Hx     Heart failure Neg Hx     Hyperlipidemia Neg Hx     Hypertension Neg Hx     Stroke Neg Hx     Atrial Septal Defect Neg Hx        Social History     Tobacco Use    Smoking status: Never    Smokeless tobacco: Never   Substance Use Topics    Alcohol use: No     Alcohol/week: 0.0 standard drinks    Drug use: No       PHYSICAL EXAM:   BP (!) 154/72 (BP Location: Left arm, Patient Position: Sitting, BP Method: Large (Automatic))   Pulse 67   Temp 97.8 °F (36.6 °C) (Oral)   Ht 5' 6" (1.676 m)   Wt 67 kg (147 lb 11.3 oz)   BMI 23.84 kg/m²   Constitutional:  Alert,   Well-appearing  In no distress.   Neurological: Normal speech  no focal findings  CN II - XII grossly intact.  Neuro completely intact   Psychiatric: Mood and affect appropriate and symmetric.   HEENT: Normocephalic / atraumatic  PERRLA  Midline trachea  Well-healed right cervical incision no erythema drainage or swelling   Cardiac: Regular rate and rhythm.   Pulmonary: Normal pulmonary effort.   Abdomen: Soft, not distended.     Skin: Warm and well perfused.    Vascular:  2+ radial pulses bilaterally   Extremities/  Musculoskeletal: No edema.        IMAGING:  Carotid duplex today reveals no residual carotid stenosis on the right no carotid stenosis on the left    Carotid duplex 2/2023 revealed a greater than 80% right carotid stenosis with a peak velocity of 452, end-diastolic of 121.  There is no significant left carotid stenosis.    CTA from June 2021 was personally reviewed.  That time she had a calcific right ICA stenosis of at least 60% by my reading.  This " was read by Radiology is 50%    IMPRESSION:  1 month status post right carotid endarterectomy doing well    PLAN:    Decreased aspirin dose to 81 mg daily, continue statin  Follow-up 11 months with screening carotid duplex     CARLOS Martinez III, MD, FACS  Professor and Chief, Vascular and Endovascular Surgery      CC: Dr Lee

## 2023-11-10 ENCOUNTER — TELEPHONE (OUTPATIENT)
Dept: RESEARCH | Facility: HOSPITAL | Age: 68
End: 2023-11-10
Payer: MEDICARE

## 2023-11-10 NOTE — TELEPHONE ENCOUNTER
[employer] II Long Term Ext. (2023-122)     I attempted to call Mrs. Hahn regarding participation in a long term extension study in regards to her previous participation in the [employer] II Research Trial.      The call was sent to [employer], I left my number for a return call.

## 2023-12-19 ENCOUNTER — TELEPHONE (OUTPATIENT)
Dept: RESEARCH | Facility: HOSPITAL | Age: 68
End: 2023-12-19
Payer: MEDICARE

## 2023-12-19 NOTE — TELEPHONE ENCOUNTER
[employer] II Long Term Ext. (2023-122)     I attempted to call Mrs. Hahn regarding participation in a long term extension study in regards to her previous participation in the [employer] II Research Trial.      The call was sent to [employer], I left my number for a return call.

## 2023-12-29 ENCOUNTER — TELEPHONE (OUTPATIENT)
Dept: RESEARCH | Facility: HOSPITAL | Age: 68
End: 2023-12-29
Payer: MEDICARE

## 2023-12-29 NOTE — TELEPHONE ENCOUNTER
[employer] II Long Term Ext. (2023-122)     I called Mrs. Hahn regarding participation in a long term extension study in regards to her previous participation in the [employer] II Research Trial. She was given the information about participation and wanted some time to think about it. I will document this on the [employer] website.

## 2024-01-03 ENCOUNTER — OFFICE VISIT (OUTPATIENT)
Dept: CARDIOLOGY | Facility: CLINIC | Age: 69
End: 2024-01-03
Payer: MEDICARE

## 2024-01-03 VITALS
BODY MASS INDEX: 25.05 KG/M2 | OXYGEN SATURATION: 96 % | HEART RATE: 69 BPM | SYSTOLIC BLOOD PRESSURE: 152 MMHG | WEIGHT: 155.88 LBS | DIASTOLIC BLOOD PRESSURE: 74 MMHG | HEIGHT: 66 IN

## 2024-01-03 DIAGNOSIS — Z98.890 S/P CAROTID ENDARTERECTOMY: ICD-10-CM

## 2024-01-03 DIAGNOSIS — I10 ESSENTIAL HYPERTENSION: Primary | ICD-10-CM

## 2024-01-03 DIAGNOSIS — Z79.4 TYPE 2 DIABETES MELLITUS WITH OTHER SPECIFIED COMPLICATION, WITH LONG-TERM CURRENT USE OF INSULIN: ICD-10-CM

## 2024-01-03 DIAGNOSIS — I65.22 LEFT CAROTID STENOSIS: ICD-10-CM

## 2024-01-03 DIAGNOSIS — E78.2 MIXED HYPERLIPIDEMIA: ICD-10-CM

## 2024-01-03 DIAGNOSIS — Z86.73 HISTORY OF TRANSIENT ISCHEMIC ATTACK (TIA): ICD-10-CM

## 2024-01-03 DIAGNOSIS — E11.69 TYPE 2 DIABETES MELLITUS WITH OTHER SPECIFIED COMPLICATION, WITH LONG-TERM CURRENT USE OF INSULIN: ICD-10-CM

## 2024-01-03 DIAGNOSIS — N18.31 STAGE 3A CHRONIC KIDNEY DISEASE: ICD-10-CM

## 2024-01-03 PROCEDURE — 99999 PR PBB SHADOW E&M-EST. PATIENT-LVL IV: CPT | Mod: PBBFAC,,, | Performed by: PHYSICIAN ASSISTANT

## 2024-01-03 PROCEDURE — 99214 OFFICE O/P EST MOD 30 MIN: CPT | Mod: PBBFAC | Performed by: PHYSICIAN ASSISTANT

## 2024-01-03 PROCEDURE — 99214 OFFICE O/P EST MOD 30 MIN: CPT | Mod: S$PBB,,, | Performed by: PHYSICIAN ASSISTANT

## 2024-01-03 RX ORDER — VALSARTAN 80 MG/1
80 TABLET ORAL DAILY
Qty: 90 TABLET | Refills: 3 | Status: SHIPPED | OUTPATIENT
Start: 2024-01-03 | End: 2024-01-08

## 2024-01-03 NOTE — PATIENT INSTRUCTIONS
You can go ahead and have a CMP and lipid panel checked after fasting for 8 hours.     The medication I am recommending that you try is called valsartan 80 mg once daily. If you begin taking this medication you can reduce your dose of felodipine to 5 mg daily. You will also need to have blood work checked again 2 weeks after starting the new medication.     Please contact the clinic when you are ready to have the second round of blood work ordered.

## 2024-01-03 NOTE — PROGRESS NOTES
Cardiology Clinic Note  Reason for Visit: HTN, HLD    HPI:     PROBLEM LIST:  Right carotid stenosis (70% DOLORES)  S/p CEA 2/2023, Dr. Martinez  H/o TIA  HTN  HLD  DM on insulin  CKD 3      Erin Hahn is a 68 y.o. F, who presents for follow up regarding HTN. She states that she scheduled this appointment a few weeks ago when her BP was elevated and she was noticing some swelling in her legs. She realized that she has been eating out more often and consuming more sodium. Recently she has been trying to eat less sodium and states that the swelling in her legs has completely resolved. She measures her BP at home and it is typically 130 SBP. She has no acute complaints today. She is due to recheck lipid panel. She is particularly concerned and cautious about her renal function. She has previously taken ARB with slight hyperkalemia. We discussed potential re-trial of ARB today.     ROS:    Pertinent ROS included in HPI and below.  PMH:     Past Medical History:   Diagnosis Date    Carotid artery bruit     Diabetes mellitus     High cholesterol     Hypertension     Stage 3a chronic kidney disease 11/3/2021    Stroke 07/2021    TIA     Past Surgical History:   Procedure Laterality Date    CAROTID ENDARTERECTOMY Right 2/27/2023    Procedure: ENDARTERECTOMY-CAROTID;  Surgeon: CAPRICE Martinez III, MD;  Location: [facility] OR [address] [place] [address];  Service: Peripheral Vascular;  Laterality: Right;    EYE SURGERY Left 03/2022    TONSILLECTOMY       Allergies:     Review of patient's allergies indicates:   Allergen Reactions    Atorvastatin Other (See Comments)     myalgias       Medications:     Current Outpatient Medications on File Prior to Visit   Medication Sig Dispense Refill    aspirin (ECOTRIN) 81 MG EC tablet Take 81 mg by mouth once daily.      b complex vitamins capsule Take 1 capsule by mouth once daily.      carvediloL (COREG) 25 MG tablet Take 1 tablet (25 mg total) by mouth 2 (two) times daily with meals. 180 tablet 3     chlorthalidone (HYGROTEN) 25 MG Tab TAKE 1 TABLET BY MOUTH EVERY DAY 90 tablet 3    co-enzyme Q-10 30 mg capsule Take 30 mg by mouth 3 (three) times daily.      INSULIN DETEMIR (LEVEMIR SUBQ) Inject 17 Units into the skin nightly.      magnesium 250 mg Tab Take 1,500 mg by mouth.      pravastatin (PRAVACHOL) 80 MG tablet Take 1 tablet (80 mg total) by mouth once daily. 90 tablet 3    sitagliptan-metformin (JANUMET) 50-1,000 mg per tablet Take 1 tablet by mouth 2 (two) times daily with meals.       vitamin D (VITAMIN D3) 1000 units Tab Take 1,000 Units by mouth once daily.      ascorbic acid, vitamin C, (VITAMIN C) 250 MG tablet Take 1,000 mg by mouth once daily.      felodipine (PLENDIL) 10 MG 24 hr tablet Take 1 tablet (10 mg total) by mouth once daily. (Patient taking differently: Take 10 mg by mouth every evening.) 30 tablet 11    HYDROcodone-acetaminophen (NORCO) 5-325 mg per tablet Take 1 tablet by mouth every 6 (six) hours as needed for Pain. (Patient not taking: Reported on 1/3/2024) 15 tablet 0    levocarnitine (L-CARNITINE ORAL) Take by mouth.      omega-3 fatty acids/fish oil (FISH OIL-OMEGA-3 FATTY ACIDS) 300-1,000 mg capsule Take by mouth once daily.       No current facility-administered medications on file prior to visit.     Social History:     Social History     Tobacco Use    Smoking status: Never    Smokeless tobacco: Never   Substance Use Topics    Alcohol use: No     Alcohol/week: 0.0 standard drinks of alcohol     Family History:     Family History   Problem Relation Age of Onset    Diabetes Mother     Hepatitis Mother     No Known Problems Father     No Known Problems Sister     No Known Problems Brother     COPD Maternal Aunt     No Known Problems Maternal Uncle     No Known Problems Paternal Aunt     No Known Problems Paternal Uncle     Diabetes Maternal Grandmother     No Known Problems Maternal Grandfather     No Known Problems Paternal Grandmother     No Known Problems Paternal Grandfather  "    Anemia Neg Hx     Arrhythmia Neg Hx     Asthma Neg Hx     Clotting disorder Neg Hx     Fainting Neg Hx     Heart attack Neg Hx     Heart disease Neg Hx     Heart failure Neg Hx     Hyperlipidemia Neg Hx     Hypertension Neg Hx     Stroke Neg Hx     Atrial Septal Defect Neg Hx      Physical Exam:   BP (!) 152/74   Pulse 69   Ht 5' 6" (1.676 m)   Wt 70.7 kg (155 lb 13.8 oz)   SpO2 96%   BMI 25.16 kg/m²      Physical Exam  Vitals and nursing note reviewed.   Constitutional:       Appearance: Normal appearance.   HENT:      Head: Normocephalic and atraumatic.   Neck:      Vascular: Normal carotid pulses. No carotid bruit or hepatojugular reflux.   Cardiovascular:      Rate and Rhythm: Normal rate and regular rhythm.      Chest Wall: PMI is not displaced.      Pulses:           Radial pulses are 2+ on the right side and 2+ on the left side.        Dorsalis pedis pulses are 2+ on the right side and 2+ on the left side.        Posterior tibial pulses are 2+ on the right side and 2+ on the left side.      Heart sounds: No murmur heard.  Pulmonary:      Effort: Pulmonary effort is normal.      Breath sounds: Normal breath sounds. No wheezing, rhonchi or rales.   Abdominal:      General: Bowel sounds are normal. There is no abdominal bruit.      Palpations: Abdomen is soft. There is no pulsatile mass.      Tenderness: There is no abdominal tenderness.   Feet:      Right foot:      Skin integrity: Skin integrity normal.      Left foot:      Skin integrity: Skin integrity normal.   Skin:     Capillary Refill: Capillary refill takes less than 2 seconds.   Neurological:      General: No focal deficit present.      Mental Status: She is alert.   Psychiatric:         Mood and Affect: Mood and affect normal.         Speech: Speech normal.         Behavior: Behavior is cooperative.         Thought Content: Thought content normal.          Labs:     Blood Tests:  Lab Results   Component Value Date    BNP 99 07/10/2017    NA " 137 2023    K 5.1 2023     2023    CO2 19 (L) 2023    BUN 25 (H) 2023    CREATININE 1.2 2023     (H) 2023    HGBA1C 6.7 (H) 2023    MG 1.5 (L) 2023    AST 16 2022    ALT 17 2022    ALBUMIN 3.8 2022    PROT 7.2 2022    BILITOT 0.4 2022    WBC 9.34 2023    HGB 9.1 (L) 2023    HCT 29.9 (L) 2023    MCV 91 2023     2023    INR 1.0 2021    TSH 1.061 2021       Lab Results   Component Value Date    CHOL 88 (L) 2022    HDL 40 2022    TRIG 64 2022       Lab Results   Component Value Date    LDLCALC 35.2 (L) 2022         Imaging:     Echocardiogram  TTE 2021  The left ventricle is normal in size with normal systolic function.  The estimated ejection fraction is 65%.  Grade I left ventricular diastolic dysfunction.  Normal right ventricular size with normal right ventricular systolic function.  Mild mitral regurgitation.  Mild tricuspid regurgitation.  The estimated PA systolic pressure is 24 mmHg.  Normal central venous pressure (3 mmHg).    Stress testing  None    Cath Lab  None    Other  Carotid US 3/7/2022  There is greater than 70% right internal carotid artery stenosis.  There is less than 50% left internal carotid artery stenosis.  There is antegrade flow in the vertebral arteries bilaterally.  Compared to images from study dated 2/3/2021, no significant change.    [id number]  Sinus rhythm with occasional Premature ventricular complexes   Nonspecific T wave abnormality   Abnormal ECG   When compared with ECG of 19-AUG-2022 12:30,   Premature ventricular complexes are now Present     Assessment:     1. Essential hypertension    2. Mixed hyperlipidemia    3. Left carotid stenosis    4. History of transient ischemic attack (TIA)    5. S/P carotid endarterectomy    6. Stage 3a chronic kidney disease    7. Type 2 diabetes mellitus with other  specified complication, with long-term current use of insulin        Plan:     Essential hypertension  -     Lipid Panel; Future; Expected date: 01/03/2024  -     Comprehensive Metabolic Panel; Future; Expected date: 01/03/2024  -     valsartan (DIOVAN) 80 MG tablet; Take 1 tablet (80 mg total) by mouth once daily.  Dispense: 90 tablet; Refill: 3  Mrs. Hahn is unsure if she is willing to take a new BP med. If she decides to proceed with valsartan we discussed possible reduction in felodipine to 5 mg (she has had some issues with leg swelling). If she does begin new medication I advised re-checking potassium in 2 weeks.   Continue to monitor BP at home with goal of < 130/80   Recommend low sodium diet   Continue chlorthalidone and carvedilol     Mixed hyperlipidemia  -     Lipid Panel; Future; Expected date: 01/03/2024  Repeat FLP ordered     Left carotid stenosis  History of transient ischemic attack (TIA)  S/P carotid endarterectomy  Follows with Dr. Martinez  Doing well post op   Continue statin and aspirin     Stage 3a chronic kidney disease  Type 2 diabetes mellitus with other specified complication, with long-term current use of insulin  Recommend addition of ARB, if tolerated  Followed by PCP       Signed:  Kirsten Loving PA-C  Cardiology     1/3/2024 12:37 PM    Follow-up:     Future Appointments   Date Time Provider Department Center   1/6/2024 10:00 AM LAB, [facility]

## 2024-01-06 ENCOUNTER — LAB VISIT (OUTPATIENT)
Dept: LAB | Facility: HOSPITAL | Age: 69
End: 2024-01-06
Payer: MEDICARE

## 2024-01-06 DIAGNOSIS — E78.2 MIXED HYPERLIPIDEMIA: ICD-10-CM

## 2024-01-06 DIAGNOSIS — I10 ESSENTIAL HYPERTENSION: ICD-10-CM

## 2024-01-06 LAB
ALBUMIN SERPL BCP-MCNC: 3.8 G/DL (ref 3.5–5.2)
ALP SERPL-CCNC: 85 U/L (ref 55–135)
ALT SERPL W/O P-5'-P-CCNC: 16 U/L (ref 10–44)
ANION GAP SERPL CALC-SCNC: 9 MMOL/L (ref 8–16)
AST SERPL-CCNC: 17 U/L (ref 10–40)
BILIRUB SERPL-MCNC: 0.4 MG/DL (ref 0.1–1)
BUN SERPL-MCNC: 23 MG/DL (ref 8–23)
CALCIUM SERPL-MCNC: 10 MG/DL (ref 8.7–10.5)
CHLORIDE SERPL-SCNC: 112 MMOL/L (ref 95–110)
CHOLEST SERPL-MCNC: 101 MG/DL (ref 120–199)
CHOLEST/HDLC SERPL: 2.5 {RATIO} (ref 2–5)
CO2 SERPL-SCNC: 23 MMOL/L (ref 23–29)
CREAT SERPL-MCNC: 1.5 MG/DL (ref 0.5–1.4)
EST. GFR  (NO RACE VARIABLE): 38 ML/MIN/1.73 M^2
GLUCOSE SERPL-MCNC: 166 MG/DL (ref 70–110)
HDLC SERPL-MCNC: 40 MG/DL (ref 40–75)
HDLC SERPL: 39.6 % (ref 20–50)
LDLC SERPL CALC-MCNC: 46.2 MG/DL (ref 63–159)
NONHDLC SERPL-MCNC: 61 MG/DL
POTASSIUM SERPL-SCNC: 5.3 MMOL/L (ref 3.5–5.1)
PROT SERPL-MCNC: 7.4 G/DL (ref 6–8.4)
SODIUM SERPL-SCNC: 144 MMOL/L (ref 136–145)
TRIGL SERPL-MCNC: 74 MG/DL (ref 30–150)

## 2024-01-06 PROCEDURE — 80053 COMPREHEN METABOLIC PANEL: CPT | Performed by: PHYSICIAN ASSISTANT

## 2024-01-06 PROCEDURE — 36415 COLL VENOUS BLD VENIPUNCTURE: CPT | Mod: PO | Performed by: PHYSICIAN ASSISTANT

## 2024-01-06 PROCEDURE — 80061 LIPID PANEL: CPT | Performed by: PHYSICIAN ASSISTANT

## 2024-01-09 ENCOUNTER — TELEPHONE (OUTPATIENT)
Dept: CARDIOLOGY | Facility: CLINIC | Age: 69
End: 2024-01-09
Payer: MEDICARE

## 2024-01-09 DIAGNOSIS — N18.31 STAGE 3A CHRONIC KIDNEY DISEASE: Primary | ICD-10-CM

## 2024-01-09 NOTE — TELEPHONE ENCOUNTER
----- Message from Kirsten Loving PA-C sent at 1/9/2024 10:23 AM CST -----  Please let her know I tried to call her, but wasn't able to get in touch. Her liver function is completely normal. Her kidney function is below normal. It has been this way for the past year. I know she is very concerned about her kidney function. I'm going to put in a referral for her to see nephrology just to be on the safe side. Her kidney function isn't terrible, but it is below normal.     Thanks  ----- Message -----  From: Leroy Marcos MA  Sent: 1/9/2024   9:09 AM CST  To: Kirsten Loving PA-C    Spoke with pt she is informed, she also wants to know the results of her kidney and liver function.  ----- Message -----  From: Kirsten Loving PA-C  Sent: 1/8/2024   9:56 AM CST  To: [facility] Staff    Please let the patient know her cholesterol is well controlled. Her potassium is slightly elevated again. This means that she should not start taking the medication we discussed. Do not start valsartan, and just continue felodipine, carvedilol and chlorthalidone for blood pressure. Eat a low potassium diet and avoid potassium supplements.     Thanks

## 2024-02-02 DIAGNOSIS — N18.31 STAGE 3A CHRONIC KIDNEY DISEASE (CKD): Primary | ICD-10-CM

## 2024-02-06 ENCOUNTER — OFFICE VISIT (OUTPATIENT)
Dept: NEPHROLOGY | Facility: CLINIC | Age: 69
End: 2024-02-06
Payer: MEDICARE

## 2024-02-06 VITALS
RESPIRATION RATE: 18 BRPM | BODY MASS INDEX: 24.43 KG/M2 | DIASTOLIC BLOOD PRESSURE: 80 MMHG | SYSTOLIC BLOOD PRESSURE: 138 MMHG | OXYGEN SATURATION: 97 % | WEIGHT: 152 LBS | HEART RATE: 71 BPM | HEIGHT: 66 IN

## 2024-02-06 DIAGNOSIS — E11.69 TYPE 2 DIABETES MELLITUS WITH OTHER SPECIFIED COMPLICATION, UNSPECIFIED WHETHER LONG TERM INSULIN USE: ICD-10-CM

## 2024-02-06 DIAGNOSIS — N18.32 STAGE 3B CHRONIC KIDNEY DISEASE: Primary | ICD-10-CM

## 2024-02-06 DIAGNOSIS — E87.5 HYPERKALEMIA: ICD-10-CM

## 2024-02-06 DIAGNOSIS — N25.81 SECONDARY RENAL HYPERPARATHYROIDISM: ICD-10-CM

## 2024-02-06 PROCEDURE — 99215 OFFICE O/P EST HI 40 MIN: CPT | Mod: PBBFAC | Performed by: INTERNAL MEDICINE

## 2024-02-06 PROCEDURE — G2211 COMPLEX E/M VISIT ADD ON: HCPCS | Mod: S$PBB,,, | Performed by: INTERNAL MEDICINE

## 2024-02-06 PROCEDURE — 99999 PR PBB SHADOW E&M-EST. PATIENT-LVL V: CPT | Mod: PBBFAC,,, | Performed by: INTERNAL MEDICINE

## 2024-02-06 PROCEDURE — 99204 OFFICE O/P NEW MOD 45 MIN: CPT | Mod: S$PBB,,, | Performed by: INTERNAL MEDICINE

## 2024-02-06 NOTE — PATIENT INSTRUCTIONS
Let's stop chlorthalidone.  Continue excellent water intake.   Let's check a kidney ultrasound and repeat labs in 3-4 weeks.

## 2024-02-06 NOTE — ASSESSMENT & PLAN NOTE
- baseline Cr 1.1-1.3 (or possibly 1.1-1.5) since 2017. No recent urine studies  - unclear etiology at this time. Either due to diabetic nephropathy or hypertensive arterionephrosclerosis (given h/o vascular disease). Ordered UPCR and renal US which will help in determining etiology  - Cr 1.5 today. Associated with Na 144 and K 5.3. Volume depletion? Patient notes excellent intake of water. BP is controlled. Will HOLD chlorthalidone for now  - continue good water intake and low salt diet  - close follow-up   - I foresee being the provider to manage this complex disease in the future.

## 2024-02-06 NOTE — ASSESSMENT & PLAN NOTE
- intermittently present since 2018. 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 Hopefully can start RAAS blockade at that time. If hyperK remains an issue, will consider RAAS blockade + SGLT2i OR RAAS blockade + lokelma/veltassa  - continue excellent water intake  - low K diet

## 2024-02-06 NOTE — PROGRESS NOTES
[facility]  [address], [address]  [place]  [phone]    Referring Provider: Kirsten Loving PA*  PCP: Shyann Peraza MD  Cardiologist: ESTUARDO Loving        HPI: Ms. Erin Hahn is a 68 y.o. female with HTN, T2DM, carotid stenosis s/p CEA 2/2023, and DLD who is here for new patient evaluation of Chronic Kidney Disease and Hyperkalemia  She is here today for evaluation of hyperkalemia and CKD. She reports her cardiologist wants to start her on losartan but her K has been running high.  Prior records obtained and reviewed. Her baseline Cr appears to be 1.1-1.3 (or possibly 1.1-1.5) with GFR in the 40-50s since 2017. No proteinuria (last UA in 2021). Labs with intermittent hyperkalemia since 2018; not usually associated with metabolic acidosis.      She reports she was diagnosed with T2DM > 15 years ago. Chart review reveals an A1c of 8.5% on 3/2020 but A1c has been </= 7% since 2021. She was diagnosed with HTN at the same time as T2DM. She notes her DM has been harder to control. BP has always been controlled. She denies peripheral edema, though did have an episode of L foot swelling about 3 weeks ago.   She denies h/o gout, nephrolithiasis, NSAID use, or family h/o kidney disease. She describes an episode of hydronephrosis ~4-5 years ago; this was managed by Dr. Patino at .   She reports good water intake: she drinks ~46oz at work and another 32-36 oz when she gets home. She avoid spinach and tomatoes due to her high K.   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         Past Medical History:   Diagnosis Date    Carotid artery bruit     Diabetes mellitus     High cholesterol     Hypertension     Stage 3a chronic kidney disease 11/3/2021    Stroke 07/2021    TIA       Past Surgical History:   Procedure Laterality Date    CAROTID ENDARTERECTOMY Right 2/27/2023    Procedure: ENDARTERECTOMY-CAROTID;  Surgeon: CAPRICE Martinez III, MD;  Location: [facility]  OR 2ND FLR;  Service: Peripheral Vascular;  Laterality: Right;    EYE SURGERY Left 03/2022    TONSILLECTOMY         Family History   Problem Relation Age of Onset    Diabetes Mother     Hepatitis Mother     No Known Problems Father     No Known Problems Sister     No Known Problems Brother     COPD Maternal Aunt     No Known Problems Maternal Uncle     No Known Problems Paternal Aunt     No Known Problems Paternal Uncle     Diabetes Maternal Grandmother     No Known Problems Maternal Grandfather     No Known Problems Paternal Grandmother     No Known Problems Paternal Grandfather     Anemia Neg Hx     Arrhythmia Neg Hx     Asthma Neg Hx     Clotting disorder Neg Hx     Fainting Neg Hx     Heart attack Neg Hx     Heart disease Neg Hx     Heart failure Neg Hx     Hyperlipidemia Neg Hx     Hypertension Neg Hx     Stroke Neg Hx     Atrial Septal Defect Neg Hx        Social History     Tobacco Use    Smoking status: Never    Smokeless tobacco: Never   Substance Use Topics    Alcohol use: No     Alcohol/week: 0.0 standard drinks of alcohol    Drug use: No         ROS:  Complete ROS otherwise negative except as indicated above.         Current Outpatient Medications:     ascorbic acid, vitamin C, (VITAMIN C) 250 MG tablet, Take 1,000 mg by mouth once daily., Disp: , Rfl:     aspirin (ECOTRIN) 81 MG EC tablet, Take 81 mg by mouth once daily., Disp: , Rfl:     b complex vitamins capsule, Take 1 capsule by mouth once daily., Disp: , Rfl:     carvediloL (COREG) 25 MG tablet, Take 1 tablet (25 mg total) by mouth 2 (two) times daily with meals., Disp: 180 tablet, Rfl: 3    chlorthalidone (HYGROTEN) 25 MG Tab, TAKE 1 TABLET BY MOUTH EVERY DAY, Disp: 90 tablet, Rfl: 3    co-enzyme Q-10 30 mg capsule, Take 30 mg by mouth 3 (three) times daily., Disp: , Rfl:     felodipine (PLENDIL) 10 MG 24 hr tablet, Take 1 tablet (10 mg total) by mouth once daily. (Patient taking differently: Take 10 mg by mouth every evening.), Disp: 30 tablet,  "Rfl: 11    INSULIN DETEMIR (LEVEMIR SUBQ), Inject 17 Units into the skin nightly., Disp: , Rfl:     magnesium 250 mg Tab, Take 1,500 mg by mouth., Disp: , Rfl:     omega-3 fatty acids/fish oil (FISH OIL-OMEGA-3 FATTY ACIDS) 300-1,000 mg capsule, Take by mouth once daily., Disp: , Rfl:     pravastatin (PRAVACHOL) 80 MG tablet, Take 1 tablet (80 mg total) by mouth once daily., Disp: 90 tablet, Rfl: 3    sitagliptan-metformin (JANUMET) 50-1,000 mg per tablet, Take 1 tablet by mouth 2 (two) times daily with meals. , Disp: , Rfl:     vitamin D (VITAMIN D3) 1000 units Tab, Take 1,000 Units by mouth once daily., Disp: , Rfl:     levocarnitine (L-CARNITINE ORAL), Take by mouth., Disp: , Rfl:       Vitals: Blood pressure 138/80, pulse 71, resp. rate 18, height 5' 6" (1.676 m), weight 68.9 kg (152 lb), SpO2 97 %. Body mass index is 24.53 kg/m².    Physical Exam  Vitals reviewed.   Constitutional:       General: She is awake. She is not in acute distress.     Appearance: Normal appearance. She is well-developed.   HENT:      Head: Normocephalic and atraumatic.      Nose: Nose normal.      Mouth/Throat:      Mouth: Mucous membranes are moist.   Eyes:      Extraocular Movements: Extraocular movements intact.      Conjunctiva/sclera: Conjunctivae normal.   Pulmonary:      Effort: Pulmonary effort is normal.   Musculoskeletal:         General: No tenderness or signs of injury.      Right lower leg: No edema.      Left lower leg: No edema.   Skin:     General: Skin is warm and dry.      Findings: No erythema or rash.   Neurological:      General: No focal deficit present.      Mental Status: She is alert. Mental status is at baseline.   Psychiatric:         Mood and Affect: Mood normal.         Behavior: Behavior normal.           Labs/Imaging:  Sodium   Date Value Ref Range Status   01/06/2024 144 136 - 145 mmol/L Final   02/28/2023 137 136 - 145 mmol/L Final   02/27/2023 140 136 - 145 mmol/L Final     Potassium   Date Value Ref " Range Status   01/06/2024 5.3 (H) 3.5 - 5.1 mmol/L Final     Comment:     *No Visible Hemolysis   02/28/2023 5.1 3.5 - 5.1 mmol/L Final   02/27/2023 4.4 3.5 - 5.1 mmol/L Final     Chloride   Date Value Ref Range Status   01/06/2024 112 (H) 95 - 110 mmol/L Final   02/28/2023 109 95 - 110 mmol/L Final   02/27/2023 110 95 - 110 mmol/L Final     CO2   Date Value Ref Range Status   01/06/2024 23 23 - 29 mmol/L Final   02/28/2023 19 (L) 23 - 29 mmol/L Final   02/27/2023 22 (L) 23 - 29 mmol/L Final     BUN   Date Value Ref Range Status   01/06/2024 23 8 - 23 mg/dL Final   02/28/2023 25 (H) 8 - 23 mg/dL Final   02/27/2023 26 (H) 8 - 23 mg/dL Final     Creatinine   Date Value Ref Range Status   01/06/2024 1.5 (H) 0.5 - 1.4 mg/dL Final   02/28/2023 1.2 0.5 - 1.4 mg/dL Final   02/27/2023 1.3 0.5 - 1.4 mg/dL Final     eGFR   Date Value Ref Range Status   01/06/2024 38.0 (A) >60 mL/min/1.73 m^2 Final   02/28/2023 49.9 (A) >60 mL/min/1.73 m^2 Final   02/27/2023 45.4 (A) >60 mL/min/1.73 m^2 Final     Glucose   Date Value Ref Range Status   06/05/2020 229 (H) 65 - 99 mg/dL Final     Calcium   Date Value Ref Range Status   01/06/2024 10.0 8.7 - 10.5 mg/dL Final   02/28/2023 9.4 8.7 - 10.5 mg/dL Final   02/27/2023 10.1 8.7 - 10.5 mg/dL Final     Phosphorus   Date Value Ref Range Status   07/03/2021 3.5 2.7 - 4.5 mg/dL Final     Albumin   Date Value Ref Range Status   01/06/2024 3.8 3.5 - 5.2 g/dL Final   08/23/2022 3.8 3.5 - 5.2 g/dL Final   03/07/2022 3.9 3.5 - 5.2 g/dL Final       Vit D, 25-Hydroxy   Date Value Ref Range Status   04/28/2020 73 30 - 96 ng/mL Final     Comment:     Vitamin D deficiency.........<10 ng/mL                              Vitamin D insufficiency......10-29 ng/mL       Vitamin D sufficiency........> or equal to 30 ng/mL  Vitamin D toxicity............>100 ng/mL         Hemoglobin   Date Value Ref Range Status   02/28/2023 9.1 (L) 12.0 - 16.0 g/dL Final   02/27/2023 9.4 (L) 12.0 - 16.0 g/dL Final   02/02/2023  "9.4 (L) 12.0 - 16.0 g/dL Final       No results found for: "UTPCR"        Renal US: ordered       Impression/Plan:    Stage 3b chronic kidney disease  - baseline Cr 1.1-1.3 (or possibly 1.1-1.5) since 2017. No recent urine studies  - unclear etiology at this time. Either due to diabetic nephropathy or hypertensive arterionephrosclerosis (given h/o vascular disease). Ordered UPCR and renal US which will help in determining etiology  - Cr 1.5 today. Associated with Na 144 and K 5.3. Volume depletion? Patient notes excellent intake of water. BP is controlled. Will HOLD chlorthalidone for now  - continue good water intake and low salt diet  - close follow-up   - I foresee being the provider to manage this complex disease in the future.     Hyperkalemia  - intermittently present since 2018. 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 Hopefully can start RAAS blockade at that time. If hyperK remains an issue, will consider RAAS blockade + SGLT2i OR RAAS blockade + lokelma/veltassa  - continue excellent water intake  - low K diet    Secondary renal hyperparathyroidism  - CCa normal; will check PTH and phos at next visit    Type 2 diabetes mellitus with other specified complication  - last A1c 6.7 in 2/2023  - ordered UPCR  - considering RAAS blockade +/- SGLT2i as above         Treatment options and plan were discussed with the patient and/or caregiver.   RTC 4 weeks.    Cari Loera MD  [facility]  [phone]          "

## 2024-03-09 ENCOUNTER — HOSPITAL ENCOUNTER (OUTPATIENT)
Dept: RADIOLOGY | Facility: HOSPITAL | Age: 69
Discharge: HOME | End: 2024-03-09
Attending: INTERNAL MEDICINE
Payer: MEDICARE

## 2024-03-09 DIAGNOSIS — N18.32 STAGE 3B CHRONIC KIDNEY DISEASE: ICD-10-CM

## 2024-03-09 DIAGNOSIS — E87.5 HYPERKALEMIA: ICD-10-CM

## 2024-03-09 PROCEDURE — 76770 US EXAM ABDO BACK WALL COMP: CPT | Mod: 26,,, | Performed by: RADIOLOGY

## 2024-03-09 PROCEDURE — 76770 US EXAM ABDO BACK WALL COMP: CPT | Mod: TC

## 2024-03-11 NOTE — PROGRESS NOTES
[facility]  [address], [address]  [place]  [phone]    Referring Provider: No ref. provider found  PCP: Shyann Peraza MD  Cardiologist: ESTUARDO Loving      HPI: Ms. Erin Hahn is a 68 y.o. female with HTN, T2DM, carotid stenosis s/p CEA 2/2023, and DLD who is here for established patient evaluation of Chronic Kidney Disease  She was referred on 2/6/24 for CKD and hyperkalemia. She reports her cardiologist wants to start her on losartan but her K has been running high.  Her baseline Cr appears to be 1.1-1.3 (or possibly 1.1-1.5) with GFR in the 40-50s since 2017. No proteinuria (last UA in 2021). Labs with intermittent hyperkalemia since 2018; not usually associated with metabolic acidosis.      She reports she was diagnosed with T2DM > 15 years ago. Chart review reveals an A1c of 8.5% on 3/2020 but A1c has been </= 7% since 2021. She was diagnosed with HTN at the same time as T2DM. She notes her DM has been harder to control. BP has always been controlled. She denies peripheral edema, though did have an episode of L foot swelling about 3 weeks ago.   She denies h/o gout, nephrolithiasis, NSAID use, or family h/o kidney disease. She describes an episode of hydronephrosis ~4-5 years ago; this was managed by Dr. Patino at .   She reports good water intake: she drinks ~46oz at work and another 32-36 oz when she gets home. She avoid spinach and tomatoes due to her high K.   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       Interval Hx:   LV 2/6/24: stopped chlorthalidone due to borderline high Na and K. Continued good water intake.  Today she reports to be doing well. She has stopped taking chlorthalidone. She hasn't been checking her BP at home. She notes to be retaining more fluid since stopping medication. Edema resolves overnight.   She continues to drink plenty of water.  She does not eat bananas. She cut back on tomatoes. Rarely eats oranges. She  continues to eat spinach; agreed to cut back to 2x per week.   No dysuria.       ROS:  Complete ROS otherwise negative except as indicated above.         Current Outpatient Medications:     ascorbic acid, vitamin C, (VITAMIN C) 250 MG tablet, Take 1,000 mg by mouth once daily., Disp: , Rfl:     aspirin (ECOTRIN) 81 MG EC tablet, Take 81 mg by mouth once daily., Disp: , Rfl:     b complex vitamins capsule, Take 1 capsule by mouth once daily., Disp: , Rfl:     carvediloL (COREG) 25 MG tablet, Take 1 tablet (25 mg total) by mouth 2 (two) times daily with meals., Disp: 180 tablet, Rfl: 3    co-enzyme Q-10 30 mg capsule, Take 30 mg by mouth 3 (three) times daily., Disp: , Rfl:     felodipine (PLENDIL) 10 MG 24 hr tablet, Take 1 tablet (10 mg total) by mouth once daily. (Patient taking differently: Take 10 mg by mouth every evening.), Disp: 30 tablet, Rfl: 11    INSULIN DETEMIR (LEVEMIR SUBQ), Inject 17 Units into the skin nightly., Disp: , Rfl:     magnesium 250 mg Tab, Take 1,500 mg by mouth., Disp: , Rfl:     omega-3 fatty acids/fish oil (FISH OIL-OMEGA-3 FATTY ACIDS) 300-1,000 mg capsule, Take by mouth once daily., Disp: , Rfl:     pravastatin (PRAVACHOL) 80 MG tablet, Take 1 tablet (80 mg total) by mouth once daily., Disp: 90 tablet, Rfl: 3    sitagliptan-metformin (JANUMET) 50-1,000 mg per tablet, Take 1 tablet by mouth 2 (two) times daily with meals. , Disp: , Rfl:     vitamin D (VITAMIN D3) 1000 units Tab, Take 1,000 Units by mouth once daily., Disp: , Rfl:     chlorthalidone (HYGROTEN) 25 MG Tab, TAKE 1 TABLET BY MOUTH EVERY DAY (Patient not taking: Reported on 3/12/2024), Disp: 90 tablet, Rfl: 3    irbesartan (AVAPRO) 150 MG tablet, Take 1 tablet (150 mg total) by mouth every evening., Disp: 30 tablet, Rfl: 11    levocarnitine (L-CARNITINE ORAL), Take by mouth., Disp: , Rfl:     sodium bicarbonate 650 MG tablet, Take 1 tablet (650 mg total) by mouth once daily., Disp: 30 tablet, Rfl: 11    sodium zirconium  "cyclosilicate (LOKELMA) 10 gram packet, Take 1 packet (10 g total) by mouth once daily. Mix entire contents of packet(s) into drinking glass containing 3 tablespoons of water; stir well and drink immediately. Add water and repeat until no powder remains to receive entire dose., Disp: 30 packet, Rfl: 11      Vitals: Blood pressure (!) 156/82, pulse 71, resp. rate 18, height 5' 6" (1.676 m), weight 68.9 kg (152 lb), SpO2 98 %. Body mass index is 24.53 kg/m².    Physical Exam  Vitals reviewed.   Constitutional:       General: She is awake. She is not in acute distress.     Appearance: Normal appearance. She is well-developed.   HENT:      Head: Normocephalic and atraumatic.      Nose: Nose normal.      Mouth/Throat:      Mouth: Mucous membranes are moist.   Eyes:      Extraocular Movements: Extraocular movements intact.      Conjunctiva/sclera: Conjunctivae normal.   Pulmonary:      Effort: Pulmonary effort is normal.   Musculoskeletal:         General: No tenderness or signs of injury.      Right lower leg: No edema.      Left lower leg: No edema.   Skin:     General: Skin is warm and dry.      Findings: No erythema or rash.   Neurological:      General: No focal deficit present.      Mental Status: She is alert. Mental status is at baseline.   Psychiatric:         Mood and Affect: Mood normal.         Behavior: Behavior normal.             Labs/Imaging:  Sodium   Date Value Ref Range Status   03/09/2024 144 136 - 145 mmol/L Final   03/09/2024 144 136 - 145 mmol/L Final   01/06/2024 144 136 - 145 mmol/L Final     Potassium   Date Value Ref Range Status   03/09/2024 5.2 (H) 3.5 - 5.1 mmol/L Final   03/09/2024 5.2 (H) 3.5 - 5.1 mmol/L Final   01/06/2024 5.3 (H) 3.5 - 5.1 mmol/L Final     Comment:     *No Visible Hemolysis     Chloride   Date Value Ref Range Status   03/09/2024 112 (H) 95 - 110 mmol/L Final   03/09/2024 112 (H) 95 - 110 mmol/L Final   01/06/2024 112 (H) 95 - 110 mmol/L Final     CO2   Date Value Ref " Range Status   03/09/2024 21 (L) 23 - 29 mmol/L Final   03/09/2024 21 (L) 23 - 29 mmol/L Final   01/06/2024 23 23 - 29 mmol/L Final     BUN   Date Value Ref Range Status   03/09/2024 21 8 - 23 mg/dL Final   03/09/2024 21 8 - 23 mg/dL Final   01/06/2024 23 8 - 23 mg/dL Final     Creatinine   Date Value Ref Range Status   03/09/2024 1.4 0.5 - 1.4 mg/dL Final   03/09/2024 1.4 0.5 - 1.4 mg/dL Final   01/06/2024 1.5 (H) 0.5 - 1.4 mg/dL Final     eGFR   Date Value Ref Range Status   03/09/2024 41 (A) >60 mL/min/1.73 m^2 Final   03/09/2024 41 (A) >60 mL/min/1.73 m^2 Final   01/06/2024 38.0 (A) >60 mL/min/1.73 m^2 Final     Glucose   Date Value Ref Range Status   06/05/2020 229 (H) 65 - 99 mg/dL Final     Calcium   Date Value Ref Range Status   03/09/2024 9.9 8.7 - 10.5 mg/dL Final   03/09/2024 9.9 8.7 - 10.5 mg/dL Final   01/06/2024 10.0 8.7 - 10.5 mg/dL Final     Phosphorus   Date Value Ref Range Status   03/09/2024 3.7 2.7 - 4.5 mg/dL Final   07/03/2021 3.5 2.7 - 4.5 mg/dL Final     Albumin   Date Value Ref Range Status   03/09/2024 3.7 3.5 - 5.2 g/dL Final   01/06/2024 3.8 3.5 - 5.2 g/dL Final   08/23/2022 3.8 3.5 - 5.2 g/dL Final       PTH, Intact   Date Value Ref Range Status   03/09/2024 75.5 9.0 - 77.0 pg/mL Final     Vit D, 25-Hydroxy   Date Value Ref Range Status   04/28/2020 73 30 - 96 ng/mL Final     Comment:     Vitamin D deficiency.........<10 ng/mL                              Vitamin D insufficiency......10-29 ng/mL       Vitamin D sufficiency........> or equal to 30 ng/mL  Vitamin D toxicity............>100 ng/mL       Uric Acid   Date Value Ref Range Status   03/09/2024 5.6 2.4 - 5.7 mg/dL Final       Hemoglobin   Date Value Ref Range Status   03/09/2024 9.5 (L) 12.0 - 16.0 g/dL Final   02/28/2023 9.1 (L) 12.0 - 16.0 g/dL Final   02/27/2023 9.4 (L) 12.0 - 16.0 g/dL Final       Prot/Creat Ratio, Urine   Date Value Ref Range Status   03/09/2024 0.44 (H) 0.00 - 0.20 Final           Renal US: 3/9/24: Right  kidney: The right kidney measures 10.4 cm. No cortical thinning. No loss of corticomedullary distinction. Resistive index measures 0.80.  No mass. No renal stone. No hydronephrosis.     Left kidney: The left kidney measures 10.1 cm. No cortical thinning. No loss of corticomedullary distinction. Resistive index measures 0.83.  No mass. No renal stone. No hydronephrosis.         Impression/Plan:    Stage 3b chronic kidney disease  - baseline Cr 1.1-1.5 since 2017; clinically due to diabetic nephropathy   - Cr 1.5 --> 1.4 today; stable and at baseline  - UPCR 0.44; not currently on RAAS due to hyperkalemia  - cautiously starting irbesartan 150mg daily today; see below. RFP in 1 week  - continue holding chlorthalidone for now  - if unable to tolerate RAAS blockade, will replace with Farxiga   - continue excellent water intake  - continue metformin  - I foresee being the provider to manage this complex disease in the future.     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 Bicarb 21.  - starting NaBicarb 650mg daily for treatment of both  - cautiously starting irbesartan today. Will add Lokelma 10g QOD. RFP in 1 week. Will adjust medications based on results  - continue excellent water intake  - low K diet; cut back on spinach. Attached handout to AVS.     Acidosis  - see above  - bicarb 21 today  - starting NaBicarb 650mg daily    Secondary renal hyperparathyroidism  - PTH 76; controlled  - CCa and phos normal. Will trend.     Anemia of chronic renal failure, stage 3b  - hgb 9.5; below goal  - ordered iron studies         Treatment options and plan were discussed with the patient and/or caregiver.   RFP 1 week. RTC 3 months.       Cari Loera MD  [facility]  [phone]    I spent a total of 49 minutes on the day of the visit.

## 2024-03-12 ENCOUNTER — OFFICE VISIT (OUTPATIENT)
Dept: NEPHROLOGY | Facility: CLINIC | Age: 69
End: 2024-03-12
Payer: MEDICARE

## 2024-03-12 VITALS
RESPIRATION RATE: 18 BRPM | SYSTOLIC BLOOD PRESSURE: 156 MMHG | HEIGHT: 66 IN | BODY MASS INDEX: 24.43 KG/M2 | OXYGEN SATURATION: 98 % | DIASTOLIC BLOOD PRESSURE: 82 MMHG | WEIGHT: 152 LBS | HEART RATE: 71 BPM

## 2024-03-12 DIAGNOSIS — E87.5 HYPERKALEMIA: ICD-10-CM

## 2024-03-12 DIAGNOSIS — D63.1 ANEMIA OF CHRONIC RENAL FAILURE, STAGE 3B: ICD-10-CM

## 2024-03-12 DIAGNOSIS — N18.32 ANEMIA OF CHRONIC RENAL FAILURE, STAGE 3B: ICD-10-CM

## 2024-03-12 DIAGNOSIS — N18.32 STAGE 3B CHRONIC KIDNEY DISEASE: Primary | ICD-10-CM

## 2024-03-12 DIAGNOSIS — N25.81 SECONDARY RENAL HYPERPARATHYROIDISM: ICD-10-CM

## 2024-03-12 DIAGNOSIS — E87.20 ACIDOSIS: ICD-10-CM

## 2024-03-12 PROCEDURE — 99999 PR PBB SHADOW E&M-EST. PATIENT-LVL III: CPT | Mod: PBBFAC,,, | Performed by: INTERNAL MEDICINE

## 2024-03-12 PROCEDURE — 99213 OFFICE O/P EST LOW 20 MIN: CPT | Mod: PBBFAC | Performed by: INTERNAL MEDICINE

## 2024-03-12 PROCEDURE — G2211 COMPLEX E/M VISIT ADD ON: HCPCS | Mod: S$PBB,,, | Performed by: INTERNAL MEDICINE

## 2024-03-12 PROCEDURE — 99215 OFFICE O/P EST HI 40 MIN: CPT | Mod: S$PBB,,, | Performed by: INTERNAL MEDICINE

## 2024-03-12 RX ORDER — SODIUM BICARBONATE 650 MG/1
650 TABLET ORAL DAILY
Qty: 30 TABLET | Refills: 11 | Status: SHIPPED | OUTPATIENT
Start: 2024-03-12 | End: 2025-03-12

## 2024-03-12 RX ORDER — IRBESARTAN 150 MG/1
150 TABLET ORAL NIGHTLY
Qty: 30 TABLET | Refills: 11 | Status: SHIPPED | OUTPATIENT
Start: 2024-03-12 | End: 2025-03-12

## 2024-03-12 NOTE — ASSESSMENT & PLAN NOTE
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 Bicarb 21.  - starting NaBicarb 650mg daily for treatment of both  - cautiously starting irbesartan today. Will add Lokelma 10g QOD. RFP in 1 week. Will adjust medications based on results  - continue excellent water intake  - low K diet; cut back on spinach. Attached handout to AVS.

## 2024-03-12 NOTE — ASSESSMENT & PLAN NOTE
- baseline Cr 1.1-1.5 since 2017; clinically due to diabetic nephropathy   - Cr 1.5 --> 1.4 today; stable and at baseline  - UPCR 0.44; not currently on RAAS due to hyperkalemia  - cautiously starting irbesartan 150mg daily today; see below. RFP in 1 week  - continue holding chlorthalidone for now  - if unable to tolerate RAAS blockade, will replace with Farxiga   - continue excellent water intake  - continue metformin  - I foresee being the provider to manage this complex disease in the future.

## 2024-03-12 NOTE — PATIENT INSTRUCTIONS
Start taking sodium bicarbonate 650mg once daily. This medication will help correct your acidosis and help bring down your potassium levels.    Let's start irbesartan once daily. This is a similar medication to losartan. This might result in high potassium levels. Let's check labs in 1 week.     Let's go ahead and start Lokelma 10g (1 packet) every OTHER day.    Repeat labs in 1 week.

## 2024-03-20 ENCOUNTER — TELEPHONE (OUTPATIENT)
Dept: VASCULAR SURGERY | Facility: CLINIC | Age: 69
End: 2024-03-20
Payer: MEDICARE

## 2024-03-20 DIAGNOSIS — I65.23 BILATERAL CAROTID ARTERY STENOSIS: Primary | ICD-10-CM

## 2024-03-20 NOTE — TELEPHONE ENCOUNTER
Attempted to contact patient in response to message. Voice message left for patient requesting return call.   ----- Message from Lalito Perdue sent at 3/20/2024 12:19 PM CDT -----  Regarding: Annual Appt requested  Contact: [phone]  Hi, pt called to request a call to schedule an annual appt. Pls call the pt at  [phone] to discuss.  Thank you.

## 2024-03-21 ENCOUNTER — TELEPHONE (OUTPATIENT)
Dept: VASCULAR SURGERY | Facility: CLINIC | Age: 69
End: 2024-03-21
Payer: MEDICARE

## 2024-03-21 RX ORDER — PRAVASTATIN SODIUM 80 MG/1
80 TABLET ORAL DAILY
Qty: 90 TABLET | Refills: 3 | OUTPATIENT
Start: 2024-03-21 | End: 2025-03-21

## 2024-03-21 RX ORDER — PRAVASTATIN SODIUM 80 MG/1
80 TABLET ORAL DAILY
Qty: 90 TABLET | Refills: 3 | Status: CANCELLED | OUTPATIENT
Start: 2024-03-21 | End: 2025-03-21

## 2024-03-21 NOTE — TELEPHONE ENCOUNTER
Attempted to contact patient in response to message. Voice message left for patient with appt details and requesting return call for questions or concerns. Appt letter placed in mail.  ----- Message from Saskia Gabriel sent at 3/21/2024 12:56 PM CDT -----  Regarding: Appointment  Contact: [phone]  Calling in regards to missed call from Kylee in office to schedule appointment. Please call and discuss.

## 2024-03-21 NOTE — TELEPHONE ENCOUNTER
Attempted to contact patient in response to message. Voice message left for patient requesting return call.   ----- Message from Vicky Blanca sent at 3/20/2024  5:30 PM CDT -----  Type: General Call Back         Name of Caller:pt  Would the patient rather a call back or a response via [facility]? call  Best Call Back Number:[phone]   Additional Information: Pt returning a missed call form YADIRA Pichardo. Please call pt with further info and assistance. Thank you.

## 2024-03-22 ENCOUNTER — TELEPHONE (OUTPATIENT)
Dept: VASCULAR SURGERY | Facility: CLINIC | Age: 69
End: 2024-03-22
Payer: MEDICARE

## 2024-03-22 RX ORDER — PRAVASTATIN SODIUM 80 MG/1
80 TABLET ORAL DAILY
Qty: 90 TABLET | Refills: 3 | Status: SHIPPED | OUTPATIENT
Start: 2024-03-22 | End: 2025-03-22

## 2024-03-22 NOTE — TELEPHONE ENCOUNTER
Contacted pt in response to message. Notified pt that Rx has been refilled by Dr. Martinez and that she should request next refill from PCP. Pt verbalized understanding. Pt also confirmed FU with Dr. Martinez.----- Message from Codie Arana sent at 3/22/2024  1:09 PM CDT -----  Regarding: Advise  Contact: [phone]  Erin Hahn calling regarding Patient Advice (message) for # pt is calling to speak with Kylee about medication   Please call work : [phone]

## 2024-03-23 ENCOUNTER — LAB VISIT (OUTPATIENT)
Dept: LAB | Facility: HOSPITAL | Age: 69
End: 2024-03-23
Attending: INTERNAL MEDICINE
Payer: MEDICARE

## 2024-03-23 DIAGNOSIS — N18.32 STAGE 3B CHRONIC KIDNEY DISEASE: ICD-10-CM

## 2024-03-23 DIAGNOSIS — N18.32 ANEMIA OF CHRONIC RENAL FAILURE, STAGE 3B: ICD-10-CM

## 2024-03-23 DIAGNOSIS — E87.5 HYPERKALEMIA: ICD-10-CM

## 2024-03-23 DIAGNOSIS — D63.1 ANEMIA OF CHRONIC RENAL FAILURE, STAGE 3B: ICD-10-CM

## 2024-03-23 DIAGNOSIS — E87.20 ACIDOSIS: ICD-10-CM

## 2024-03-23 DIAGNOSIS — N25.81 SECONDARY RENAL HYPERPARATHYROIDISM: ICD-10-CM

## 2024-03-23 LAB
ALBUMIN SERPL BCP-MCNC: 3.4 G/DL (ref 3.5–5.2)
ANION GAP SERPL CALC-SCNC: 6 MMOL/L (ref 8–16)
BUN SERPL-MCNC: 26 MG/DL (ref 8–23)
CALCIUM SERPL-MCNC: 9.3 MG/DL (ref 8.7–10.5)
CHLORIDE SERPL-SCNC: 114 MMOL/L (ref 95–110)
CO2 SERPL-SCNC: 19 MMOL/L (ref 23–29)
CREAT SERPL-MCNC: 1.3 MG/DL (ref 0.5–1.4)
EST. GFR  (NO RACE VARIABLE): 45 ML/MIN/1.73 M^2
GLUCOSE SERPL-MCNC: 145 MG/DL (ref 70–110)
PHOSPHATE SERPL-MCNC: 4.2 MG/DL (ref 2.7–4.5)
POTASSIUM SERPL-SCNC: 5 MMOL/L (ref 3.5–5.1)
SODIUM SERPL-SCNC: 139 MMOL/L (ref 136–145)

## 2024-03-23 PROCEDURE — 80069 RENAL FUNCTION PANEL: CPT | Performed by: INTERNAL MEDICINE

## 2024-03-23 PROCEDURE — 36415 COLL VENOUS BLD VENIPUNCTURE: CPT | Performed by: INTERNAL MEDICINE

## 2024-03-26 ENCOUNTER — TELEPHONE (OUTPATIENT)
Dept: NEPHROLOGY | Facility: CLINIC | Age: 69
End: 2024-03-26
Payer: MEDICARE

## 2024-03-26 NOTE — TELEPHONE ENCOUNTER
I left a message for this patient in reference of recently resulted labs viewed by Dr. Loera. Patient informed to give the office a call back.

## 2024-03-26 NOTE — PROGRESS NOTES
Please let her know that her potassium and creatinine (kidney function) are stable.  Continue irbesartan, it seems to be working.    Thanks!

## 2024-03-26 NOTE — TELEPHONE ENCOUNTER
----- Message from Cari Loera MD sent at 3/26/2024  1:24 PM CDT -----  Please let her know that her potassium and creatinine (kidney function) are stable.  Continue irbesartan, it seems to be working.    Thanks!

## 2024-03-27 ENCOUNTER — TELEPHONE (OUTPATIENT)
Dept: NEPHROLOGY | Facility: CLINIC | Age: 69
End: 2024-03-27
Payer: MEDICARE

## 2024-04-08 DIAGNOSIS — Z79.4 TYPE 2 DIABETES MELLITUS WITH OTHER SPECIFIED COMPLICATION, WITH LONG-TERM CURRENT USE OF INSULIN: ICD-10-CM

## 2024-04-08 DIAGNOSIS — E11.69 TYPE 2 DIABETES MELLITUS WITH OTHER SPECIFIED COMPLICATION, WITH LONG-TERM CURRENT USE OF INSULIN: ICD-10-CM

## 2024-04-08 DIAGNOSIS — I10 ESSENTIAL HYPERTENSION: ICD-10-CM

## 2024-04-08 DIAGNOSIS — I67.9 CVD (CEREBROVASCULAR DISEASE): ICD-10-CM

## 2024-04-09 RX ORDER — CARVEDILOL 25 MG/1
25 TABLET ORAL 2 TIMES DAILY WITH MEALS
Qty: 180 TABLET | Refills: 3 | Status: SHIPPED | OUTPATIENT
Start: 2024-04-09

## 2024-04-15 ENCOUNTER — DOCUMENTATION ONLY (OUTPATIENT)
Dept: NEPHROLOGY | Facility: CLINIC | Age: 69
End: 2024-04-15
Payer: MEDICARE

## 2024-04-15 NOTE — PROGRESS NOTES
Received labs from PCP office.  Labs from 4/4/24:   Na 140, K 5.2, Cl 109, bicarb 24, BUN 27, Cr 1.26, GFR 47, Ca 9.7, Alb 4.2, Glu 117.    A1c 6.7%.      K and Cr remain stable on RAAS blockade. No medication changes.      Cari Loera MD  [facility]  [phone]

## 2024-04-16 ENCOUNTER — HOSPITAL ENCOUNTER (OUTPATIENT)
Dept: VASCULAR SURGERY | Facility: CLINIC | Age: 69
Discharge: HOME | End: 2024-04-16
Attending: SURGERY
Payer: MEDICARE

## 2024-04-16 ENCOUNTER — OFFICE VISIT (OUTPATIENT)
Dept: VASCULAR SURGERY | Facility: CLINIC | Age: 69
End: 2024-04-16
Attending: SURGERY
Payer: MEDICARE

## 2024-04-16 VITALS
DIASTOLIC BLOOD PRESSURE: 65 MMHG | WEIGHT: 158.06 LBS | HEIGHT: 66 IN | HEART RATE: 73 BPM | TEMPERATURE: 99 F | BODY MASS INDEX: 25.4 KG/M2 | SYSTOLIC BLOOD PRESSURE: 138 MMHG

## 2024-04-16 DIAGNOSIS — Z98.890 S/P CAROTID ENDARTERECTOMY: ICD-10-CM

## 2024-04-16 DIAGNOSIS — I65.23 BILATERAL CAROTID ARTERY STENOSIS: ICD-10-CM

## 2024-04-16 DIAGNOSIS — Z86.73 HISTORY OF TRANSIENT ISCHEMIC ATTACK (TIA): Primary | ICD-10-CM

## 2024-04-16 PROCEDURE — 93880 EXTRACRANIAL BILAT STUDY: CPT | Mod: PBBFAC | Performed by: SURGERY

## 2024-04-16 PROCEDURE — 93880 EXTRACRANIAL BILAT STUDY: CPT | Mod: 26,S$PBB,, | Performed by: SURGERY

## 2024-04-16 PROCEDURE — 99214 OFFICE O/P EST MOD 30 MIN: CPT | Mod: S$PBB,,, | Performed by: SURGERY

## 2024-04-16 PROCEDURE — 99999 PR PBB SHADOW E&M-EST. PATIENT-LVL IV: CPT | Mod: PBBFAC,,, | Performed by: SURGERY

## 2024-04-16 PROCEDURE — 99214 OFFICE O/P EST MOD 30 MIN: CPT | Mod: PBBFAC,25 | Performed by: SURGERY

## 2024-04-16 NOTE — PROGRESS NOTES
VASCULAR SURGERY SERVICE    REFERRING DOCTOR: Dr Lee    CHIEF COMPLAINT:  Carotid stenosis    HISTORY OF PRESENT ILLNESS: Erin Hahn is a 68 y.o. female, right-handed, with insulin-dependent diabetes and CKD 3A, who had a right hemispheric TIA in July 2021 as manifested by 10 minutes of left arm weakness.  She would had no episodes of stroke TIA or amaurosis before after this.  At this time she was on aspirin and statin this has been maintained.  She was subsequently evaluated for a right carotid stent with Dr. Black, but she ultimately declined this procedure.    Notably, the CTA performed that time and suggested a 50-60% carotid stenosis.  Recent carotid duplex shows progression of her right carotid stenosis to greater than 80%.  She is had no further episodes of stroke TIA or amaurosis since.    She states compliance with her aspirin and statin.    She is no history of MI or shortness of breath.  She had a 2D echo at [facility] with normal ejection fraction    4/4/2023:  This is her initial postoperative visit status post right carotid endarterectomy 02/27/2023.  She would an unremarkable postop course was discharged on the 1st postoperative day.  She now returns.  Denies interval stroke TIA or amaurosis.  Notably she is taking aspirin 325 mg as well as her statin    04/16/2024:  This is a 1 year follow-up.  She denies interval stroke TIA or amaurosis.  She states compliance with her aspirin and statin    Past Medical History:   Diagnosis Date    Carotid artery bruit     Diabetes mellitus     High cholesterol     Hypertension     Stage 3a chronic kidney disease 11/3/2021    Stroke 07/2021    TIA       Past Surgical History:   Procedure Laterality Date    CAROTID ENDARTERECTOMY Right 2/27/2023    Procedure: ENDARTERECTOMY-CAROTID;  Surgeon: CAPRICE Martinez III, MD;  Location: [facility] OR [address] [place] [address];  Service: Peripheral Vascular;  Laterality: Right;    EYE SURGERY Left 03/2022    TONSILLECTOMY            Current Outpatient Medications:     ascorbic acid, vitamin C, (VITAMIN C) 250 MG tablet, Take 1,000 mg by mouth once daily., Disp: , Rfl:     aspirin (ECOTRIN) 81 MG EC tablet, Take 81 mg by mouth once daily., Disp: , Rfl:     b complex vitamins capsule, Take 1 capsule by mouth once daily., Disp: , Rfl:     carvediloL (COREG) 25 MG tablet, TAKE 1 TABLET BY MOUTH TWICE A DAY WITH FOOD, Disp: 180 tablet, Rfl: 3    chlorthalidone (HYGROTEN) 25 MG Tab, TAKE 1 TABLET BY MOUTH EVERY DAY, Disp: 90 tablet, Rfl: 3    co-enzyme Q-10 30 mg capsule, Take 30 mg by mouth 3 (three) times daily., Disp: , Rfl:     INSULIN DETEMIR (LEVEMIR SUBQ), Inject 17 Units into the skin nightly., Disp: , Rfl:     irbesartan (AVAPRO) 150 MG tablet, Take 1 tablet (150 mg total) by mouth every evening., Disp: 30 tablet, Rfl: 11    levocarnitine (L-CARNITINE ORAL), Take by mouth., Disp: , Rfl:     magnesium 250 mg Tab, Take 1,500 mg by mouth., Disp: , Rfl:     omega-3 fatty acids/fish oil (FISH OIL-OMEGA-3 FATTY ACIDS) 300-1,000 mg capsule, Take by mouth once daily., Disp: , Rfl:     pravastatin (PRAVACHOL) 80 MG tablet, Take 1 tablet (80 mg total) by mouth once daily., Disp: 90 tablet, Rfl: 3    sitagliptan-metformin (JANUMET) 50-1,000 mg per tablet, Take 1 tablet by mouth 2 (two) times daily with meals. , Disp: , Rfl:     sodium bicarbonate 650 MG tablet, Take 1 tablet (650 mg total) by mouth once daily., Disp: 30 tablet, Rfl: 11    sodium zirconium cyclosilicate (LOKELMA) 10 gram packet, Take 1 packet (10 g total) by mouth once daily. Mix entire contents of packet(s) into drinking glass containing 3 tablespoons of water; stir well and drink immediately. Add water and repeat until no powder remains to receive entire dose., Disp: 30 packet, Rfl: 11    vitamin D (VITAMIN D3) 1000 units Tab, Take 1,000 Units by mouth once daily., Disp: , Rfl:     felodipine (PLENDIL) 10 MG 24 hr tablet, Take 1 tablet (10 mg total) by mouth once daily.  "(Patient taking differently: Take 10 mg by mouth every evening.), Disp: 30 tablet, Rfl: 11    Review of patient's allergies indicates:   Allergen Reactions    Atorvastatin Other (See Comments)     myalgias      Losartan      hyperkalemia       Family History   Problem Relation Name Age of Onset    Diabetes Mother      Hepatitis Mother      No Known Problems Father      No Known Problems Sister      No Known Problems Brother 1     COPD Maternal Aunt      No Known Problems Maternal Uncle      No Known Problems Paternal Aunt      No Known Problems Paternal Uncle      Diabetes Maternal Grandmother      No Known Problems Maternal Grandfather      No Known Problems Paternal Grandmother      No Known Problems Paternal Grandfather      Anemia Neg Hx      Arrhythmia Neg Hx      Asthma Neg Hx      Clotting disorder Neg Hx      Fainting Neg Hx      Heart attack Neg Hx      Heart disease Neg Hx      Heart failure Neg Hx      Hyperlipidemia Neg Hx      Hypertension Neg Hx      Stroke Neg Hx      Atrial Septal Defect Neg Hx         Social History     Tobacco Use    Smoking status: Never    Smokeless tobacco: Never   Substance Use Topics    Alcohol use: No     Alcohol/week: 0.0 standard drinks of alcohol    Drug use: No       PHYSICAL EXAM:   /65   Pulse 73   Temp 98.6 °F (37 °C) (Oral)   Ht 5' 6" (1.676 m)   Wt 71.7 kg (158 lb 1.1 oz)   BMI 25.51 kg/m²   Constitutional:  Alert,   Well-appearing  In no distress.   Neurological: Normal speech  no focal findings  CN II - XII grossly intact.  Neuro completely intact   Psychiatric: Mood and affect appropriate and symmetric.   HEENT: Normocephalic / atraumatic  PERRLA  Midline trachea  Well-healed right cervical scar   Cardiac: Regular rate and rhythm.   Pulmonary: Normal pulmonary effort.   Abdomen: Soft, not distended.     Skin: Warm and well perfused.    Vascular:  2+ radial pulses bilaterally   Extremities/  Musculoskeletal: No edema.        IMAGING:  Carotid duplex " today reveals no residual carotid stenosis on the right no carotid stenosis on the left.  Stable from prior    Carotid duplex 2/2023 revealed a greater than 80% right carotid stenosis with a peak velocity of 452, end-diastolic of 121.  There is no significant left carotid stenosis.    CTA from June 2021 was personally reviewed.  That time she had a calcific right ICA stenosis of at least 60% by my reading.  This was read by Radiology is 50%    IMPRESSION:  14 month status post right carotid endarterectomy doing well    PLAN:    Continue aspirin and statin  Follow-up 12 months with screening carotid duplex     CARLOS Martinez III, MD, FACS  Professor and Chief, Vascular and Endovascular Surgery      CC: Dr Lee

## 2024-05-10 ENCOUNTER — TELEPHONE (OUTPATIENT)
Dept: NEPHROLOGY | Facility: CLINIC | Age: 69
End: 2024-05-10
Payer: MEDICARE

## 2024-05-10 NOTE — TELEPHONE ENCOUNTER
I left a message for this patient in reference of reminder for labs/urine scheduled tomorrow for .

## 2024-05-11 ENCOUNTER — LAB VISIT (OUTPATIENT)
Dept: LAB | Facility: HOSPITAL | Age: 69
End: 2024-05-11
Attending: INTERNAL MEDICINE
Payer: MEDICARE

## 2024-05-11 DIAGNOSIS — E87.5 HYPERKALEMIA: ICD-10-CM

## 2024-05-11 DIAGNOSIS — N18.32 ANEMIA OF CHRONIC RENAL FAILURE, STAGE 3B: ICD-10-CM

## 2024-05-11 DIAGNOSIS — D63.1 ANEMIA OF CHRONIC RENAL FAILURE, STAGE 3B: ICD-10-CM

## 2024-05-11 DIAGNOSIS — N25.81 SECONDARY RENAL HYPERPARATHYROIDISM: ICD-10-CM

## 2024-05-11 DIAGNOSIS — N18.32 STAGE 3B CHRONIC KIDNEY DISEASE: ICD-10-CM

## 2024-05-11 DIAGNOSIS — E87.20 ACIDOSIS: ICD-10-CM

## 2024-05-11 LAB
ALBUMIN SERPL BCP-MCNC: 3.4 G/DL (ref 3.5–5.2)
ANION GAP SERPL CALC-SCNC: 8 MMOL/L (ref 8–16)
BASOPHILS # BLD AUTO: 0.02 K/UL (ref 0–0.2)
BASOPHILS NFR BLD: 0.4 % (ref 0–1.9)
BUN SERPL-MCNC: 35 MG/DL (ref 8–23)
CALCIUM SERPL-MCNC: 9.2 MG/DL (ref 8.7–10.5)
CHLORIDE SERPL-SCNC: 110 MMOL/L (ref 95–110)
CO2 SERPL-SCNC: 20 MMOL/L (ref 23–29)
CREAT SERPL-MCNC: 1.7 MG/DL (ref 0.5–1.4)
DIFFERENTIAL METHOD BLD: ABNORMAL
EOSINOPHIL # BLD AUTO: 0.1 K/UL (ref 0–0.5)
EOSINOPHIL NFR BLD: 1.3 % (ref 0–8)
ERYTHROCYTE [DISTWIDTH] IN BLOOD BY AUTOMATED COUNT: 13.1 % (ref 11.5–14.5)
EST. GFR  (NO RACE VARIABLE): 33 ML/MIN/1.73 M^2
FERRITIN SERPL-MCNC: 65 NG/ML (ref 20–300)
GLUCOSE SERPL-MCNC: 186 MG/DL (ref 70–110)
HCT VFR BLD AUTO: 28.7 % (ref 37–48.5)
HGB BLD-MCNC: 8.7 G/DL (ref 12–16)
IMM GRANULOCYTES # BLD AUTO: 0.02 K/UL (ref 0–0.04)
IMM GRANULOCYTES NFR BLD AUTO: 0.4 % (ref 0–0.5)
IRON SERPL-MCNC: 74 UG/DL (ref 30–160)
LYMPHOCYTES # BLD AUTO: 1.3 K/UL (ref 1–4.8)
LYMPHOCYTES NFR BLD: 25.1 % (ref 18–48)
MCH RBC QN AUTO: 26.6 PG (ref 27–31)
MCHC RBC AUTO-ENTMCNC: 30.3 G/DL (ref 32–36)
MCV RBC AUTO: 88 FL (ref 82–98)
MONOCYTES # BLD AUTO: 0.4 K/UL (ref 0.3–1)
MONOCYTES NFR BLD: 6.9 % (ref 4–15)
NEUTROPHILS # BLD AUTO: 3.4 K/UL (ref 1.8–7.7)
NEUTROPHILS NFR BLD: 65.9 % (ref 38–73)
NRBC BLD-RTO: 0 /100 WBC
PHOSPHATE SERPL-MCNC: 4 MG/DL (ref 2.7–4.5)
PLATELET # BLD AUTO: 214 K/UL (ref 150–450)
PMV BLD AUTO: 10 FL (ref 9.2–12.9)
POTASSIUM SERPL-SCNC: 5.3 MMOL/L (ref 3.5–5.1)
RBC # BLD AUTO: 3.27 M/UL (ref 4–5.4)
SATURATED IRON: 20 % (ref 20–50)
SODIUM SERPL-SCNC: 138 MMOL/L (ref 136–145)
TOTAL IRON BINDING CAPACITY: 364 UG/DL (ref 250–450)
TRANSFERRIN SERPL-MCNC: 246 MG/DL (ref 200–375)
URATE SERPL-MCNC: 6.1 MG/DL (ref 2.4–5.7)
WBC # BLD AUTO: 5.22 K/UL (ref 3.9–12.7)

## 2024-05-11 PROCEDURE — 80069 RENAL FUNCTION PANEL: CPT | Performed by: INTERNAL MEDICINE

## 2024-05-11 PROCEDURE — 83970 ASSAY OF PARATHORMONE: CPT | Performed by: INTERNAL MEDICINE

## 2024-05-11 PROCEDURE — 82728 ASSAY OF FERRITIN: CPT | Performed by: INTERNAL MEDICINE

## 2024-05-11 PROCEDURE — 83540 ASSAY OF IRON: CPT | Performed by: INTERNAL MEDICINE

## 2024-05-11 PROCEDURE — 84550 ASSAY OF BLOOD/URIC ACID: CPT | Performed by: INTERNAL MEDICINE

## 2024-05-11 PROCEDURE — 36415 COLL VENOUS BLD VENIPUNCTURE: CPT | Performed by: INTERNAL MEDICINE

## 2024-05-11 PROCEDURE — 85025 COMPLETE CBC W/AUTO DIFF WBC: CPT | Performed by: INTERNAL MEDICINE

## 2024-05-12 LAB — PTH-INTACT SERPL-MCNC: 124.2 PG/ML (ref 9–77)

## 2024-05-13 NOTE — PROGRESS NOTES
[facility]  [address] [facility] [address], [address]  [place]  [phone]    PCP: Shyann Peraza MD  Cardiologist: ESTUARDO Loving       HPI: Ms. Erin Hahn is a 68 y.o. female with HTN, T2DM, carotid stenosis s/p CEA 2/2023, and DLD who is here for established patient evaluation of Chronic Kidney Disease  She was referred on 2/6/24 for CKD and hyperkalemia. She reports her cardiologist wants to start her on losartan but her K has been running high.  Her baseline Cr appears to be 1.1-1.3 (or possibly 1.1-1.5) with GFR in the 40-50s since 2017. No proteinuria (last UA in 2021). Labs with intermittent hyperkalemia since 2018; not usually associated with metabolic acidosis.      She reports she was diagnosed with T2DM > 15 years ago. Chart review reveals an A1c of 8.5% on 3/2020 but A1c has been </= 7% since 2021. She was diagnosed with HTN at the same time as T2DM. She notes her DM has been harder to control. BP has always been controlled. She denies peripheral edema, though did have an episode of L foot swelling about 3 weeks ago.   She denies h/o gout, nephrolithiasis, NSAID use, or family h/o kidney disease. She describes an episode of hydronephrosis ~4-5 years ago; this was managed by Dr. Patino at .   She reports good water intake: she drinks ~46oz at work and another 32-36 oz when she gets home. She avoid spinach and tomatoes due to her high K.   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      2/6/24: stopped chlorthalidone due to borderline high Na and K. Continued good water intake.      Interval Hx:   LV 3/12/24: started irbesartan 150mg daily, lokelma 10g QOD, NaBicarb 650mg daily. Provided low K handout. F/u labs on 4/15 with stable K and Cr.   Today she reports to be doing well. Never started taking Lokelma as the cost was $400. Reports compliance with irbesartan and NaBicarb.   Janumet recently changed to metformin 500mg BID.   BP controlled at home. No edema.    Hgb 8.7. Not interested in IV iron due to work schedule. Agreed to start po iron.       ROS:  Complete ROS otherwise negative except as indicated above.         Current Outpatient Medications:     ascorbic acid, vitamin C, (VITAMIN C) 250 MG tablet, Take 1,000 mg by mouth once daily., Disp: , Rfl:     aspirin (ECOTRIN) 81 MG EC tablet, Take 81 mg by mouth once daily., Disp: , Rfl:     b complex vitamins capsule, Take 1 capsule by mouth once daily., Disp: , Rfl:     BASAGLAR KWIKPEN U-100 INSULIN 100 unit/mL (3 mL) InPn pen, Inject 20 Units into the skin once daily. Taking at night., Disp: , Rfl:     carvediloL (COREG) 25 MG tablet, TAKE 1 TABLET BY MOUTH TWICE A DAY WITH FOOD, Disp: 180 tablet, Rfl: 3    co-enzyme Q-10 30 mg capsule, Take 30 mg by mouth 3 (three) times daily., Disp: , Rfl:     felodipine (PLENDIL) 10 MG 24 hr tablet, Take 1 tablet (10 mg total) by mouth once daily. (Patient taking differently: Take 10 mg by mouth every evening.), Disp: 30 tablet, Rfl: 11    irbesartan (AVAPRO) 150 MG tablet, Take 1 tablet (150 mg total) by mouth every evening., Disp: 30 tablet, Rfl: 11    levocarnitine (L-CARNITINE ORAL), Take by mouth., Disp: , Rfl:     magnesium 250 mg Tab, Take 1,500 mg by mouth., Disp: , Rfl:     metFORMIN (GLUCOPHAGE) 500 MG tablet, Take 500 mg by mouth 2 (two) times daily with meals., Disp: , Rfl:     omega-3 fatty acids/fish oil (FISH OIL-OMEGA-3 FATTY ACIDS) 300-1,000 mg capsule, Take by mouth once daily., Disp: , Rfl:     pravastatin (PRAVACHOL) 80 MG tablet, Take 1 tablet (80 mg total) by mouth once daily., Disp: 90 tablet, Rfl: 3    sodium bicarbonate 650 MG tablet, Take 1 tablet (650 mg total) by mouth once daily., Disp: 30 tablet, Rfl: 11    vitamin D (VITAMIN D3) 1000 units Tab, Take 1,000 Units by mouth once daily., Disp: , Rfl:     chlorthalidone (HYGROTEN) 25 MG Tab, TAKE 1 TABLET BY MOUTH EVERY DAY (Patient not taking: Reported on 5/14/2024), Disp: 90 tablet, Rfl: 3    ferrous  "sulfate (FEOSOL) 325 mg (65 mg iron) Tab tablet, Take 1 tablet (325 mg total) by mouth 2 (two) times daily., Disp: 60 tablet, Rfl: 11    INSULIN DETEMIR (LEVEMIR SUBQ), Inject 17 Units into the skin nightly. (Patient not taking: Reported on 5/14/2024), Disp: , Rfl:     sitagliptan-metformin (JANUMET) 50-1,000 mg per tablet, Take 1 tablet by mouth 2 (two) times daily with meals.  (Patient not taking: Reported on 5/14/2024), Disp: , Rfl:     sodium zirconium cyclosilicate (LOKELMA) 10 gram packet, Take 1 packet (10 g total) by mouth once daily. Mix entire contents of packet(s) into drinking glass containing 3 tablespoons of water; stir well and drink immediately. Add water and repeat until no powder remains to receive entire dose. (Patient not taking: Reported on 5/14/2024), Disp: 30 packet, Rfl: 11      Vitals: Blood pressure 122/62, pulse 72, resp. rate 18, height 5' 6" (1.676 m), weight 71.5 kg (157 lb 10.1 oz), SpO2 96%. Body mass index is 25.44 kg/m².    Physical Exam  Vitals reviewed.   Constitutional:       General: She is awake. She is not in acute distress.     Appearance: Normal appearance. She is well-developed.   HENT:      Head: Normocephalic and atraumatic.      Nose: Nose normal.      Mouth/Throat:      Mouth: Mucous membranes are moist.   Eyes:      Extraocular Movements: Extraocular movements intact.      Conjunctiva/sclera: Conjunctivae normal.   Pulmonary:      Effort: Pulmonary effort is normal.   Musculoskeletal:         General: No tenderness or signs of injury.      Right lower leg: No edema.      Left lower leg: No edema.   Skin:     General: Skin is warm and dry.      Findings: No erythema or rash.   Neurological:      General: No focal deficit present.      Mental Status: She is alert. Mental status is at baseline.   Psychiatric:         Mood and Affect: Mood normal.         Behavior: Behavior normal.             Labs/Imaging:  Sodium   Date Value Ref Range Status   05/11/2024 138 136 - 145 " mmol/L Final   03/23/2024 139 136 - 145 mmol/L Final   03/09/2024 144 136 - 145 mmol/L Final   03/09/2024 144 136 - 145 mmol/L Final     Potassium   Date Value Ref Range Status   05/11/2024 5.3 (H) 3.5 - 5.1 mmol/L Final   03/23/2024 5.0 3.5 - 5.1 mmol/L Final   03/09/2024 5.2 (H) 3.5 - 5.1 mmol/L Final   03/09/2024 5.2 (H) 3.5 - 5.1 mmol/L Final     Chloride   Date Value Ref Range Status   05/11/2024 110 95 - 110 mmol/L Final   03/23/2024 114 (H) 95 - 110 mmol/L Final   03/09/2024 112 (H) 95 - 110 mmol/L Final   03/09/2024 112 (H) 95 - 110 mmol/L Final     CO2   Date Value Ref Range Status   05/11/2024 20 (L) 23 - 29 mmol/L Final   03/23/2024 19 (L) 23 - 29 mmol/L Final   03/09/2024 21 (L) 23 - 29 mmol/L Final   03/09/2024 21 (L) 23 - 29 mmol/L Final     BUN   Date Value Ref Range Status   05/11/2024 35 (H) 8 - 23 mg/dL Final   03/23/2024 26 (H) 8 - 23 mg/dL Final   03/09/2024 21 8 - 23 mg/dL Final   03/09/2024 21 8 - 23 mg/dL Final     Creatinine   Date Value Ref Range Status   05/11/2024 1.7 (H) 0.5 - 1.4 mg/dL Final   03/23/2024 1.3 0.5 - 1.4 mg/dL Final   03/09/2024 1.4 0.5 - 1.4 mg/dL Final   03/09/2024 1.4 0.5 - 1.4 mg/dL Final     eGFR   Date Value Ref Range Status   05/11/2024 33 (A) >60 mL/min/1.73 m^2 Final   03/23/2024 45 (A) >60 mL/min/1.73 m^2 Final   03/09/2024 41 (A) >60 mL/min/1.73 m^2 Final   03/09/2024 41 (A) >60 mL/min/1.73 m^2 Final     Glucose   Date Value Ref Range Status   06/05/2020 229 (H) 65 - 99 mg/dL Final     Calcium   Date Value Ref Range Status   05/11/2024 9.2 8.7 - 10.5 mg/dL Final   03/23/2024 9.3 8.7 - 10.5 mg/dL Final   03/09/2024 9.9 8.7 - 10.5 mg/dL Final   03/09/2024 9.9 8.7 - 10.5 mg/dL Final     Phosphorus   Date Value Ref Range Status   05/11/2024 4.0 2.7 - 4.5 mg/dL Final   03/23/2024 4.2 2.7 - 4.5 mg/dL Final   03/09/2024 3.7 2.7 - 4.5 mg/dL Final     Albumin   Date Value Ref Range Status   05/11/2024 3.4 (L) 3.5 - 5.2 g/dL Final   03/23/2024 3.4 (L) 3.5 - 5.2 g/dL Final    03/09/2024 3.7 3.5 - 5.2 g/dL Final       PTH, Intact   Date Value Ref Range Status   05/11/2024 124.2 (H) 9.0 - 77.0 pg/mL Final   03/09/2024 75.5 9.0 - 77.0 pg/mL Final     Vit D, 25-Hydroxy   Date Value Ref Range Status   04/28/2020 73 30 - 96 ng/mL Final     Comment:     Vitamin D deficiency.........<10 ng/mL                              Vitamin D insufficiency......10-29 ng/mL       Vitamin D sufficiency........> or equal to 30 ng/mL  Vitamin D toxicity............>100 ng/mL       Uric Acid   Date Value Ref Range Status   05/11/2024 6.1 (H) 2.4 - 5.7 mg/dL Final   03/09/2024 5.6 2.4 - 5.7 mg/dL Final       Hemoglobin   Date Value Ref Range Status   05/11/2024 8.7 (L) 12.0 - 16.0 g/dL Final   03/09/2024 9.5 (L) 12.0 - 16.0 g/dL Final   02/28/2023 9.1 (L) 12.0 - 16.0 g/dL Final       Prot/Creat Ratio, Urine   Date Value Ref Range Status   05/11/2024 0.17 0.00 - 0.20 Final   03/09/2024 0.44 (H) 0.00 - 0.20 Final           Renal US:   3/9/24: Right kidney: The right kidney measures 10.4 cm. No cortical thinning. No loss of corticomedullary distinction. Resistive index measures 0.80.  No mass. No renal stone. No hydronephrosis.     Left kidney: The left kidney measures 10.1 cm. No cortical thinning. No loss of corticomedullary distinction. Resistive index measures 0.83.  No mass. No renal stone. No hydronephrosis.      Impression/Plan:    Stage 3b chronic kidney disease  - baseline Cr 1.1-1.5 since 2017; clinically due to diabetic nephropathy   - Cr 1.4 --> 1.7 today; mildly above baseline. Possibly due to RAAS blockade. Possibly due to worsening anemia.   - UPCR 0.44 --> 0.17; improved, which significantly lowers risk of progression to ESRD  - continue irbesartan 150mg daily for now  - continue excellent water intake  - continue metformin; will stop once GFR < 30%    Hyperkalemia  - intermittently present since 2018; likely due to type IV RTA from T2DM, though acidosis is not always present  - K 5.2 --> 5.3 after  starting irbesartan; acceptable. Continue low K diet (of note, patient still eating spinach and tomatoes). Lokelma too expensive. If hyperK worsens, will try Veltassa  - continue NaBicarb    Acidosis  - bicarb 21 --> 20 today on NaBicarb 650mg daily; stable  - will repeat at next visit and likely increase dose if hyperkalemia persists     Anemia of chronic renal failure, stage 3b  - hgb 9.5 --> 8.7; below goal  - Tsat 20%, ferritin 65  - unable to due IV iron due to work schedule  - starting iron 325mg BID; monitor for constipation     Secondary renal hyperparathyroidism  - ; controlled. CCa and phos normal. Will trend.       Visit today included increased complexity associated with the care of the episodic problem CKD addressed and managing the longitudinal care of the patient due to the serious and/or complex managed problem(s) CKD, AoCKD.      Treatment options and plan were discussed with the patient and/or caregiver.   RTC 2 months.       Cari Loera MD  [facility]  [phone]

## 2024-05-14 ENCOUNTER — OFFICE VISIT (OUTPATIENT)
Dept: NEPHROLOGY | Facility: CLINIC | Age: 69
End: 2024-05-14
Payer: MEDICARE

## 2024-05-14 VITALS
OXYGEN SATURATION: 96 % | RESPIRATION RATE: 18 BRPM | SYSTOLIC BLOOD PRESSURE: 122 MMHG | WEIGHT: 157.63 LBS | HEART RATE: 72 BPM | HEIGHT: 66 IN | DIASTOLIC BLOOD PRESSURE: 62 MMHG | BODY MASS INDEX: 25.33 KG/M2

## 2024-05-14 DIAGNOSIS — N25.81 SECONDARY RENAL HYPERPARATHYROIDISM: ICD-10-CM

## 2024-05-14 DIAGNOSIS — E87.5 HYPERKALEMIA: ICD-10-CM

## 2024-05-14 DIAGNOSIS — N18.32 ANEMIA OF CHRONIC RENAL FAILURE, STAGE 3B: ICD-10-CM

## 2024-05-14 DIAGNOSIS — N18.32 STAGE 3B CHRONIC KIDNEY DISEASE: Primary | ICD-10-CM

## 2024-05-14 DIAGNOSIS — D63.1 ANEMIA OF CHRONIC RENAL FAILURE, STAGE 3B: ICD-10-CM

## 2024-05-14 DIAGNOSIS — E87.20 ACIDOSIS: ICD-10-CM

## 2024-05-14 PROCEDURE — 99214 OFFICE O/P EST MOD 30 MIN: CPT | Mod: S$PBB,,, | Performed by: INTERNAL MEDICINE

## 2024-05-14 PROCEDURE — 99215 OFFICE O/P EST HI 40 MIN: CPT | Mod: PBBFAC | Performed by: INTERNAL MEDICINE

## 2024-05-14 PROCEDURE — 99999 PR PBB SHADOW E&M-EST. PATIENT-LVL V: CPT | Mod: PBBFAC,,, | Performed by: INTERNAL MEDICINE

## 2024-05-14 RX ORDER — FERROUS SULFATE 325(65) MG
325 TABLET ORAL 2 TIMES DAILY
Qty: 60 TABLET | Refills: 11 | Status: SHIPPED | OUTPATIENT
Start: 2024-05-14

## 2024-05-14 RX ORDER — METFORMIN HYDROCHLORIDE 500 MG/1
500 TABLET ORAL 2 TIMES DAILY WITH MEALS
COMMUNITY

## 2024-05-14 RX ORDER — INSULIN GLARGINE 100 [IU]/ML
20 INJECTION, SOLUTION SUBCUTANEOUS DAILY
COMMUNITY
Start: 2024-02-06

## 2024-05-14 NOTE — ASSESSMENT & PLAN NOTE
- intermittently present since 2018; likely due to type IV RTA from T2DM, though acidosis is not always present  - K 5.2 --> 5.3 after starting irbesartan; acceptable. Continue low K diet (of note, patient still eating spinach and tomatoes). Lokelma too expensive. If hyperK worsens, will try Veltassa  - continue NaBicarb

## 2024-05-14 NOTE — PATIENT INSTRUCTIONS
Start iron 2x/day.   Continue irbesartan and sodium bicarbonate.       Foods high in iron:  Red meat  Liver  Oysters, clams, shrimp, and sardines  Egg yolks  Chicken and turkey  Cereal and grains with iron added  Tofu

## 2024-05-14 NOTE — ASSESSMENT & PLAN NOTE
- baseline Cr 1.1-1.5 since 2017; clinically due to diabetic nephropathy   - Cr 1.4 --> 1.7 today; mildly above baseline. Possibly due to RAAS blockade. Possibly due to worsening anemia.   - UPCR 0.44 --> 0.17; improved, which significantly lowers risk of progression to ESRD  - continue irbesartan 150mg daily for now  - continue excellent water intake  - continue metformin; will stop once GFR < 30%

## 2024-05-14 NOTE — ASSESSMENT & PLAN NOTE
- hgb 9.5 --> 8.7; below goal  - Tsat 20%, ferritin 65  - unable to due IV iron due to work schedule  - starting iron 325mg BID; monitor for constipation

## 2024-05-14 NOTE — ASSESSMENT & PLAN NOTE
- bicarb 21 --> 20 today on NaBicarb 650mg daily; stable  - will repeat at next visit and likely increase dose if hyperkalemia persists

## 2024-05-29 ENCOUNTER — TELEPHONE (OUTPATIENT)
Dept: NEPHROLOGY | Facility: CLINIC | Age: 69
End: 2024-05-29
Payer: MEDICARE

## 2024-05-29 NOTE — TELEPHONE ENCOUNTER
Patient called in today in reference of a recent accident at work. One of her doctor's has prescribed her oxycodone for pain as well as a muscle relaxer. She would like to know your thoughts before taking the medication. If needed, she will toughen it out, and take tylenol.     Please advise

## 2024-05-29 NOTE — TELEPHONE ENCOUNTER
I have spoken with patient in reference of concerns for medications prescribed. Informed that the medication prescribed should be safe for kidney disease; however, she should try to stay away from NSAID's. Patient mentioned receiving a Toradol shot on yesterday.

## 2024-06-07 ENCOUNTER — TELEPHONE (OUTPATIENT)
Dept: NEPHROLOGY | Facility: CLINIC | Age: 69
End: 2024-06-07
Payer: MEDICARE

## 2024-06-07 NOTE — TELEPHONE ENCOUNTER
Patient has called in today in reference of being prescribed tramadol, informed per last discussion that we would like to stay away from NSAID's. Informed that tramadol was not considered an NSAID, but not to be used often(only to manage pain caused by the accident). This medication is safe for her kidney function.

## 2024-07-20 ENCOUNTER — LAB VISIT (OUTPATIENT)
Dept: LAB | Facility: HOSPITAL | Age: 69
End: 2024-07-20
Attending: INTERNAL MEDICINE
Payer: MEDICARE

## 2024-07-20 DIAGNOSIS — N25.81 SECONDARY RENAL HYPERPARATHYROIDISM: ICD-10-CM

## 2024-07-20 DIAGNOSIS — E87.5 HYPERKALEMIA: ICD-10-CM

## 2024-07-20 DIAGNOSIS — N18.32 STAGE 3B CHRONIC KIDNEY DISEASE: ICD-10-CM

## 2024-07-20 DIAGNOSIS — D63.1 ANEMIA OF CHRONIC RENAL FAILURE, STAGE 3B: ICD-10-CM

## 2024-07-20 DIAGNOSIS — E87.20 ACIDOSIS: ICD-10-CM

## 2024-07-20 DIAGNOSIS — N18.32 ANEMIA OF CHRONIC RENAL FAILURE, STAGE 3B: ICD-10-CM

## 2024-07-20 LAB
BACTERIA #/AREA URNS HPF: ABNORMAL /HPF
BILIRUB UR QL STRIP: NEGATIVE
CLARITY UR: ABNORMAL
COLOR UR: YELLOW
CREAT UR-MCNC: 249.7 MG/DL (ref 15–325)
GLUCOSE UR QL STRIP: NEGATIVE
HGB UR QL STRIP: NEGATIVE
HYALINE CASTS #/AREA URNS LPF: 18 /LPF
KETONES UR QL STRIP: ABNORMAL
LEUKOCYTE ESTERASE UR QL STRIP: ABNORMAL
MICROSCOPIC COMMENT: ABNORMAL
NITRITE UR QL STRIP: NEGATIVE
PH UR STRIP: 6 [PH] (ref 5–8)
PROT UR QL STRIP: ABNORMAL
PROT UR-MCNC: 59 MG/DL
PROT/CREAT UR: 0.24 MG/G{CREAT} (ref 0–0.2)
RBC #/AREA URNS HPF: 3 /HPF (ref 0–4)
SP GR UR STRIP: 1.02 (ref 1–1.03)
SQUAMOUS #/AREA URNS HPF: 5 /HPF
URN SPEC COLLECT METH UR: ABNORMAL
WBC #/AREA URNS HPF: 57 /HPF (ref 0–5)
WBC CLUMPS URNS QL MICRO: ABNORMAL

## 2024-07-20 PROCEDURE — 82570 ASSAY OF URINE CREATININE: CPT | Performed by: INTERNAL MEDICINE

## 2024-07-20 PROCEDURE — 84156 ASSAY OF PROTEIN URINE: CPT | Performed by: INTERNAL MEDICINE

## 2024-07-20 PROCEDURE — 81001 URINALYSIS AUTO W/SCOPE: CPT | Performed by: INTERNAL MEDICINE

## 2024-08-06 ENCOUNTER — OFFICE VISIT (OUTPATIENT)
Dept: NEPHROLOGY | Facility: CLINIC | Age: 69
End: 2024-08-06
Payer: MEDICARE

## 2024-08-06 VITALS
OXYGEN SATURATION: 96 % | HEART RATE: 73 BPM | WEIGHT: 159.19 LBS | HEIGHT: 66 IN | SYSTOLIC BLOOD PRESSURE: 110 MMHG | DIASTOLIC BLOOD PRESSURE: 62 MMHG | BODY MASS INDEX: 25.58 KG/M2 | RESPIRATION RATE: 18 BRPM

## 2024-08-06 DIAGNOSIS — D63.1 ANEMIA OF CHRONIC RENAL FAILURE, STAGE 3B: ICD-10-CM

## 2024-08-06 DIAGNOSIS — N18.32 STAGE 3B CHRONIC KIDNEY DISEASE: Primary | ICD-10-CM

## 2024-08-06 DIAGNOSIS — E87.20 ACIDOSIS: ICD-10-CM

## 2024-08-06 DIAGNOSIS — N18.32 ANEMIA OF CHRONIC RENAL FAILURE, STAGE 3B: ICD-10-CM

## 2024-08-06 DIAGNOSIS — N25.81 SECONDARY RENAL HYPERPARATHYROIDISM: ICD-10-CM

## 2024-08-06 DIAGNOSIS — E87.5 HYPERKALEMIA: ICD-10-CM

## 2024-08-06 PROCEDURE — G2211 COMPLEX E/M VISIT ADD ON: HCPCS | Mod: S$PBB,,, | Performed by: INTERNAL MEDICINE

## 2024-08-06 PROCEDURE — 99999 PR PBB SHADOW E&M-EST. PATIENT-LVL III: CPT | Mod: PBBFAC,,, | Performed by: INTERNAL MEDICINE

## 2024-08-06 PROCEDURE — 99213 OFFICE O/P EST LOW 20 MIN: CPT | Mod: PBBFAC | Performed by: INTERNAL MEDICINE

## 2024-08-06 PROCEDURE — 99214 OFFICE O/P EST MOD 30 MIN: CPT | Mod: S$PBB,,, | Performed by: INTERNAL MEDICINE

## 2025-02-26 ENCOUNTER — LAB VISIT (OUTPATIENT)
Dept: LAB | Facility: HOSPITAL | Age: 70
End: 2025-02-26
Attending: INTERNAL MEDICINE
Payer: COMMERCIAL

## 2025-02-26 DIAGNOSIS — D63.1 ANEMIA OF CHRONIC RENAL FAILURE, STAGE 3B: ICD-10-CM

## 2025-02-26 DIAGNOSIS — N25.81 SECONDARY RENAL HYPERPARATHYROIDISM: ICD-10-CM

## 2025-02-26 DIAGNOSIS — N18.32 ANEMIA OF CHRONIC RENAL FAILURE, STAGE 3B: ICD-10-CM

## 2025-02-26 DIAGNOSIS — E87.5 HYPERKALEMIA: ICD-10-CM

## 2025-02-26 DIAGNOSIS — N18.32 STAGE 3B CHRONIC KIDNEY DISEASE: ICD-10-CM

## 2025-02-26 DIAGNOSIS — E87.20 ACIDOSIS: ICD-10-CM

## 2025-02-26 LAB
ALBUMIN SERPL BCP-MCNC: 3.8 G/DL (ref 3.5–5.2)
ANION GAP SERPL CALC-SCNC: 10 MMOL/L (ref 8–16)
BUN SERPL-MCNC: 26 MG/DL (ref 8–23)
CALCIUM SERPL-MCNC: 9.5 MG/DL (ref 8.7–10.5)
CHLORIDE SERPL-SCNC: 111 MMOL/L (ref 95–110)
CO2 SERPL-SCNC: 18 MMOL/L (ref 23–29)
CREAT SERPL-MCNC: 1.7 MG/DL (ref 0.5–1.4)
EST. GFR  (NO RACE VARIABLE): 32.5 ML/MIN/1.73 M^2
FERRITIN SERPL-MCNC: 117 NG/ML (ref 20–300)
GLUCOSE SERPL-MCNC: 72 MG/DL (ref 70–110)
IRON SERPL-MCNC: 72 UG/DL (ref 30–160)
PHOSPHATE SERPL-MCNC: 4.6 MG/DL (ref 2.7–4.5)
POTASSIUM SERPL-SCNC: 6 MMOL/L (ref 3.5–5.1)
PTH-INTACT SERPL-MCNC: 143.8 PG/ML (ref 9–77)
SATURATED IRON: 19 % (ref 20–50)
SODIUM SERPL-SCNC: 139 MMOL/L (ref 136–145)
TOTAL IRON BINDING CAPACITY: 373 UG/DL (ref 250–450)
TRANSFERRIN SERPL-MCNC: 252 MG/DL (ref 200–375)
URATE SERPL-MCNC: 5.5 MG/DL (ref 2.4–5.7)

## 2025-02-26 PROCEDURE — 36415 COLL VENOUS BLD VENIPUNCTURE: CPT | Mod: PO | Performed by: INTERNAL MEDICINE

## 2025-02-26 PROCEDURE — 84466 ASSAY OF TRANSFERRIN: CPT | Performed by: INTERNAL MEDICINE

## 2025-02-26 PROCEDURE — 82728 ASSAY OF FERRITIN: CPT | Performed by: INTERNAL MEDICINE

## 2025-02-26 PROCEDURE — 83970 ASSAY OF PARATHORMONE: CPT | Performed by: INTERNAL MEDICINE

## 2025-02-26 PROCEDURE — 80069 RENAL FUNCTION PANEL: CPT | Performed by: INTERNAL MEDICINE

## 2025-02-26 PROCEDURE — 85025 COMPLETE CBC W/AUTO DIFF WBC: CPT | Performed by: INTERNAL MEDICINE

## 2025-02-26 PROCEDURE — 84550 ASSAY OF BLOOD/URIC ACID: CPT | Performed by: INTERNAL MEDICINE

## 2025-02-27 ENCOUNTER — RESULTS FOLLOW-UP (OUTPATIENT)
Dept: NEPHROLOGY | Facility: HOSPITAL | Age: 70
End: 2025-02-27
Payer: COMMERCIAL

## 2025-02-27 DIAGNOSIS — E87.5 HYPERKALEMIA: Primary | ICD-10-CM

## 2025-02-27 LAB
BASOPHILS # BLD AUTO: 0.03 K/UL (ref 0–0.2)
BASOPHILS NFR BLD: 0.5 % (ref 0–1.9)
DIFFERENTIAL METHOD BLD: ABNORMAL
EOSINOPHIL # BLD AUTO: 0.1 K/UL (ref 0–0.5)
EOSINOPHIL NFR BLD: 1.3 % (ref 0–8)
ERYTHROCYTE [DISTWIDTH] IN BLOOD BY AUTOMATED COUNT: 13.2 % (ref 11.5–14.5)
HCT VFR BLD AUTO: 31 % (ref 37–48.5)
HGB BLD-MCNC: 9 G/DL (ref 12–16)
IMM GRANULOCYTES # BLD AUTO: 0.01 K/UL (ref 0–0.04)
IMM GRANULOCYTES NFR BLD AUTO: 0.2 % (ref 0–0.5)
LYMPHOCYTES # BLD AUTO: 1.8 K/UL (ref 1–4.8)
LYMPHOCYTES NFR BLD: 29.7 % (ref 18–48)
MCH RBC QN AUTO: 26.9 PG (ref 27–31)
MCHC RBC AUTO-ENTMCNC: 29 G/DL (ref 32–36)
MCV RBC AUTO: 93 FL (ref 82–98)
MONOCYTES # BLD AUTO: 0.6 K/UL (ref 0.3–1)
MONOCYTES NFR BLD: 9.6 % (ref 4–15)
NEUTROPHILS # BLD AUTO: 3.5 K/UL (ref 1.8–7.7)
NEUTROPHILS NFR BLD: 58.7 % (ref 38–73)
NRBC BLD-RTO: 0 /100 WBC
PLATELET # BLD AUTO: 258 K/UL (ref 150–450)
PMV BLD AUTO: 10.7 FL (ref 9.2–12.9)
RBC # BLD AUTO: 3.35 M/UL (ref 4–5.4)
WBC # BLD AUTO: 6.03 K/UL (ref 3.9–12.7)

## 2025-02-27 NOTE — TELEPHONE ENCOUNTER
"----- Message from Cari Loera MD sent at 2/27/2025 12:56 PM CST -----  Please let patient know that her potassium is elevated again; it is 6.0 (normal is < 4.5). We need to intervene to bring it down. I sent a prescription for Lokelma to her pharmacy. She previously   could not afford this medication. Please have her come grab the "Free 7-day trial" voucher for the medication and start this ASAP.   Please have her hold her irbesartan until she starts Lokelma.    Thanks!    ----- Message -----  From: Erik, [facility]  Sent: 2/26/2025  11:06 PM CST  To: Cari Loera MD    "

## 2025-02-27 NOTE — PROGRESS NOTES
"Please let patient know that her potassium is elevated again; it is 6.0 (normal is < 4.5). We need to intervene to bring it down. I sent a prescription for Lokelma to her pharmacy. She previously could not afford this medication. Please have her come grab the "Free 7-day trial" voucher for the medication and start this ASAP.   Please have her hold her irbesartan until she starts Lokelma.    Thanks!    "

## 2025-03-02 DIAGNOSIS — N18.32 STAGE 3B CHRONIC KIDNEY DISEASE: ICD-10-CM

## 2025-03-02 DIAGNOSIS — N25.81 SECONDARY RENAL HYPERPARATHYROIDISM: ICD-10-CM

## 2025-03-02 DIAGNOSIS — E87.5 HYPERKALEMIA: ICD-10-CM

## 2025-03-02 DIAGNOSIS — N18.32 ANEMIA OF CHRONIC RENAL FAILURE, STAGE 3B: ICD-10-CM

## 2025-03-02 DIAGNOSIS — E87.20 ACIDOSIS: ICD-10-CM

## 2025-03-02 DIAGNOSIS — D63.1 ANEMIA OF CHRONIC RENAL FAILURE, STAGE 3B: ICD-10-CM

## 2025-03-03 NOTE — TELEPHONE ENCOUNTER
Sent to Dr. Loera  LOV-8/6/2024  RTC-Sched.3/5/2025  Last Prescribed-3/12/2024(Medication may be on hold)//Patient recently began [facility])

## 2025-03-05 ENCOUNTER — OFFICE VISIT (OUTPATIENT)
Dept: NEPHROLOGY | Facility: CLINIC | Age: 70
End: 2025-03-05
Payer: COMMERCIAL

## 2025-03-05 VITALS
OXYGEN SATURATION: 96 % | HEIGHT: 66 IN | WEIGHT: 156.44 LBS | BODY MASS INDEX: 25.14 KG/M2 | DIASTOLIC BLOOD PRESSURE: 80 MMHG | RESPIRATION RATE: 18 BRPM | SYSTOLIC BLOOD PRESSURE: 138 MMHG | HEART RATE: 78 BPM

## 2025-03-05 DIAGNOSIS — N18.32 STAGE 3B CHRONIC KIDNEY DISEASE: Primary | ICD-10-CM

## 2025-03-05 DIAGNOSIS — D63.1 ANEMIA OF CHRONIC RENAL FAILURE, STAGE 3B: ICD-10-CM

## 2025-03-05 DIAGNOSIS — E87.20 ACIDOSIS: ICD-10-CM

## 2025-03-05 DIAGNOSIS — N25.81 SECONDARY RENAL HYPERPARATHYROIDISM: ICD-10-CM

## 2025-03-05 DIAGNOSIS — E87.5 HYPERKALEMIA: ICD-10-CM

## 2025-03-05 DIAGNOSIS — N18.32 ANEMIA OF CHRONIC RENAL FAILURE, STAGE 3B: ICD-10-CM

## 2025-03-05 PROCEDURE — G2211 COMPLEX E/M VISIT ADD ON: HCPCS | Mod: S$GLB,,, | Performed by: INTERNAL MEDICINE

## 2025-03-05 PROCEDURE — 3066F NEPHROPATHY DOC TX: CPT | Mod: CPTII,S$GLB,, | Performed by: INTERNAL MEDICINE

## 2025-03-05 PROCEDURE — 99214 OFFICE O/P EST MOD 30 MIN: CPT | Mod: S$GLB,,, | Performed by: INTERNAL MEDICINE

## 2025-03-05 PROCEDURE — 3079F DIAST BP 80-89 MM HG: CPT | Mod: CPTII,S$GLB,, | Performed by: INTERNAL MEDICINE

## 2025-03-05 PROCEDURE — 3075F SYST BP GE 130 - 139MM HG: CPT | Mod: CPTII,S$GLB,, | Performed by: INTERNAL MEDICINE

## 2025-03-05 PROCEDURE — 99999 PR PBB SHADOW E&M-EST. PATIENT-LVL V: CPT | Mod: PBBFAC,,, | Performed by: INTERNAL MEDICINE

## 2025-03-05 PROCEDURE — 4010F ACE/ARB THERAPY RXD/TAKEN: CPT | Mod: CPTII,S$GLB,, | Performed by: INTERNAL MEDICINE

## 2025-03-05 PROCEDURE — 3008F BODY MASS INDEX DOCD: CPT | Mod: CPTII,S$GLB,, | Performed by: INTERNAL MEDICINE

## 2025-03-05 PROCEDURE — 1159F MED LIST DOCD IN RCRD: CPT | Mod: CPTII,S$GLB,, | Performed by: INTERNAL MEDICINE

## 2025-03-05 PROCEDURE — 1101F PT FALLS ASSESS-DOCD LE1/YR: CPT | Mod: CPTII,S$GLB,, | Performed by: INTERNAL MEDICINE

## 2025-03-05 PROCEDURE — 3288F FALL RISK ASSESSMENT DOCD: CPT | Mod: CPTII,S$GLB,, | Performed by: INTERNAL MEDICINE

## 2025-03-05 RX ORDER — FERROUS SULFATE 325(65) MG
325 TABLET ORAL 2 TIMES DAILY
Qty: 60 TABLET | Refills: 11 | Status: SHIPPED | OUTPATIENT
Start: 2025-03-05

## 2025-03-05 NOTE — PATIENT INSTRUCTIONS
Continue holding irbesartan for now.   Let's check labs tomorrow. Based on the results, we'll either restart irbesartan or continue Lokelma.    Take iron tablets twice daily.

## 2025-03-05 NOTE — PROGRESS NOTES
[facility]  [address] [facility], [address]  [place]  [phone]    PCP: Shyann Peraza MD  Cardiologist: ESTUARDO Loving       HPI: Ms. Erin Hahn is a 69 y.o. female with HTN, T2DM, carotid stenosis s/p CEA 2/2023, and DLD who is here for established patient evaluation of Chronic Kidney Disease  She was referred on 2/6/24 for CKD and hyperkalemia. She reports her cardiologist wants to start her on losartan but her K has been running high.  Her baseline Cr appears to be 1.1-1.3 (or possibly 1.1-1.5) with GFR in the 40-50s since 2017. No proteinuria (last UA in 2021). Labs with intermittent hyperkalemia since 2018; not usually associated with metabolic acidosis.      She reports she was diagnosed with T2DM > 15 years ago. Chart review reveals an A1c of 8.5% on 3/2020 but A1c has been </= 7% since 2021. She was diagnosed with HTN at the same time as T2DM. She notes her DM has been harder to control. BP has always been controlled. She denies peripheral edema, though did have an episode of L foot swelling about 3 weeks ago.   She denies h/o gout, nephrolithiasis, NSAID use, or family h/o kidney disease. She describes an episode of hydronephrosis ~4-5 years ago; this was managed by Dr. Patino at .   She reports good water intake: she drinks ~46oz at work and another 32-36 oz when she gets home. She avoid spinach and tomatoes due to her high K.   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      2/6/24: stopped chlorthalidone due to borderline high Na and K. Continued good water intake.     3/12/24: started irbesartan 150mg daily, lokelma 10g QOD, NaBicarb 650mg daily. Provided low K handout. F/u labs on 4/15 with stable K and Cr.      5/14/24: started iron BID. Not on lokelma due to cost ($400).        Interval Hx:   LV 8/6/24: provided low K diet handout.   Labs on 2/27 with K 6.0; provided 7-day trial of Lokelma.  Today she reports to be doing okay. Lokelma was on  "backorder at pharmacy; received 1st dose yesterday. Currently holding irbesartan. BP remains controlled.   Cr 1.7 on 2/26; she denies any recent illnesses, medication changes, or hypotension. She notes intermittent diarrhea that she attributes to Janumet.   She takes iron on occasion; not BID. Past-due for CSC; recently made appt with GI.   Recent A1c up to 7; working on bring this down.  Reports compliance with sodium bicarb once daily.   Reports severe muscle cramps about 1 week ago (labs on 2/26 with K 6); no further episodes.   She reports good water intake.       ROS:  Complete ROS otherwise negative except as indicated above.       Current Medications[1]      Vitals: Blood pressure 138/80, pulse 78, resp. rate 18, height 5' 6" (1.676 m), weight 70.9 kg (156 lb 6.7 oz), SpO2 96%. Body mass index is 25.25 kg/m².    Physical Exam  Vitals reviewed.   Constitutional:       General: She is awake. She is not in acute distress.     Appearance: Normal appearance. She is well-developed.   HENT:      Head: Normocephalic and atraumatic.      Nose: Nose normal.      Mouth/Throat:      Mouth: Mucous membranes are moist.   Eyes:      Extraocular Movements: Extraocular movements intact.      Conjunctiva/sclera: Conjunctivae normal.   Pulmonary:      Effort: Pulmonary effort is normal.   Musculoskeletal:         General: No tenderness or signs of injury.      Right lower leg: No edema.      Left lower leg: No edema.   Skin:     General: Skin is warm and dry.      Findings: No erythema or rash.   Neurological:      General: No focal deficit present.      Mental Status: She is alert. Mental status is at baseline.   Psychiatric:         Mood and Affect: Mood normal.         Behavior: Behavior normal.           Labs/Imaging:  Sodium   Date Value Ref Range Status   03/06/2025 141 136 - 145 mmol/L Final   02/26/2025 139 136 - 145 mmol/L Final   05/11/2024 138 136 - 145 mmol/L Final     Potassium   Date Value Ref Range Status "   03/06/2025 4.6 3.5 - 5.1 mmol/L Final   02/26/2025 6.0 (H) 3.5 - 5.1 mmol/L Final     Comment:     *No Visible Hemolysis   05/11/2024 5.3 (H) 3.5 - 5.1 mmol/L Final     Chloride   Date Value Ref Range Status   03/06/2025 113 (H) 95 - 110 mmol/L Final   02/26/2025 111 (H) 95 - 110 mmol/L Final   05/11/2024 110 95 - 110 mmol/L Final     CO2   Date Value Ref Range Status   03/06/2025 21 (L) 23 - 29 mmol/L Final   02/26/2025 18 (L) 23 - 29 mmol/L Final   05/11/2024 20 (L) 23 - 29 mmol/L Final     BUN   Date Value Ref Range Status   03/06/2025 24 (H) 8 - 23 mg/dL Final   02/26/2025 26 (H) 8 - 23 mg/dL Final   05/11/2024 35 (H) 8 - 23 mg/dL Final     Creatinine   Date Value Ref Range Status   03/06/2025 1.4 0.5 - 1.4 mg/dL Final   02/26/2025 1.7 (H) 0.5 - 1.4 mg/dL Final   05/11/2024 1.7 (H) 0.5 - 1.4 mg/dL Final     eGFR   Date Value Ref Range Status   03/06/2025 41.0 (A) >60 mL/min/1.73 m^2 Final   02/26/2025 32.5 (A) >60 mL/min/1.73 m^2 Final   05/11/2024 33 (A) >60 mL/min/1.73 m^2 Final     Calcium   Date Value Ref Range Status   03/06/2025 9.1 8.7 - 10.5 mg/dL Final   02/26/2025 9.5 8.7 - 10.5 mg/dL Final   05/11/2024 9.2 8.7 - 10.5 mg/dL Final     Phosphorus   Date Value Ref Range Status   03/06/2025 3.5 2.7 - 4.5 mg/dL Final   02/26/2025 4.6 (H) 2.7 - 4.5 mg/dL Final   05/11/2024 4.0 2.7 - 4.5 mg/dL Final     Albumin   Date Value Ref Range Status   03/06/2025 3.6 3.5 - 5.2 g/dL Final   02/26/2025 3.8 3.5 - 5.2 g/dL Final   05/11/2024 3.4 (L) 3.5 - 5.2 g/dL Final       PTH, Intact   Date Value Ref Range Status   02/26/2025 143.8 (H) 9.0 - 77.0 pg/mL Final   05/11/2024 124.2 (H) 9.0 - 77.0 pg/mL Final   03/09/2024 75.5 9.0 - 77.0 pg/mL Final     Vit D, 25-Hydroxy   Date Value Ref Range Status   04/28/2020 73 30 - 96 ng/mL Final     Comment:     Vitamin D deficiency.........<10 ng/mL                              Vitamin D insufficiency......10-29 ng/mL       Vitamin D sufficiency........> or equal to 30  ng/mL  Vitamin D toxicity............>100 ng/mL       Uric Acid   Date Value Ref Range Status   02/26/2025 5.5 2.4 - 5.7 mg/dL Final   05/11/2024 6.1 (H) 2.4 - 5.7 mg/dL Final   03/09/2024 5.6 2.4 - 5.7 mg/dL Final       Hemoglobin   Date Value Ref Range Status   02/26/2025 9.0 (L) 12.0 - 16.0 g/dL Final   05/11/2024 8.7 (L) 12.0 - 16.0 g/dL Final   03/09/2024 9.5 (L) 12.0 - 16.0 g/dL Final       Prot/Creat Ratio, Urine   Date Value Ref Range Status   02/26/2025 0.16 0.00 - 0.20 Final   07/20/2024 0.24 (H) 0.00 - 0.20 Final   05/11/2024 0.17 0.00 - 0.20 Final         7/11/24:   Na 142, K 5.3, Cl 112, CO2 25, BUN 27, Cr 1.43, GFR 40%, Ca 9.7, Alb 4.2, Glu 123  PTH 80, vitamin D 44, A1c 6.5  WBC 6.0, hgb 9.4, plt 243. Tsat 21%, ferritin 60  ACR 61        Renal US: 3/9/24:   Right kidney: The right kidney measures 10.4 cm. No cortical thinning. No loss of corticomedullary distinction. Resistive index measures 0.80.  No mass. No renal stone. No hydronephrosis.     Left kidney: The left kidney measures 10.1 cm. No cortical thinning. No loss of corticomedullary distinction. Resistive index measures 0.83.  No mass. No renal stone. No hydronephrosis.        Impression/Plan:    Stage 3b chronic kidney disease  - baseline Cr 1.1-1.5 since 2017; clinically due to diabetic nephropathy   - Cr 1.4 --> 1.7 today; associated with hyperK and acidosis. Will repeat  - UPCR 0.17 --> 0.24 --> 0.16; negative.   - currently holding irbesartan due to hyperK. If next K improved, will restart irbesartan  - continue excellent water intake  - previously offered SGLT2i; would need to monitor for hypoglycemia. 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 Holding irbesartan and giving Lokelma 10g daily  - repeat labs  tomorrow. If K improved, will restart irbesartan and decrease Lokelma to twice weekly  - low K diet; previously provided handout  - continue NaBicarb    Acidosis  - bicarb 25 --> 18 today; worsening  - continue bicarb 650mg QD for now; will increase at next visit if remains low    Anemia of chronic renal failure, stage 3b  - hgb 8.7 --> 9.4 --> 9.0; stable  - Tsat 19%, ferritin 117; unchanged  - increase iron from PRN to BID  - f/u with GI    Secondary renal hyperparathyroidism  -  --> 80 --> 143; acceptable. CCa and phos normal. Will trend.         Visit today included increased complexity associated with the care of the episodic problem hyperkalemia addressed and managing the longitudinal care of the patient due to the serious and/or complex managed problem(s) CKD.      Treatment options and plan were discussed with the patient and/or caregiver.   RFP today/tomorrow. RTC 4 weeks.      Cari Loera MD  [facility]  [phone]      Addendum:  RFP 3/6/25: K 4.6, CO2 21, Cr 1.4.   Restart irbesartan. Continue Lokelma 10g BIW.      Cari Loera MD  [facility]  [phone]               [1]   Current Outpatient Medications:     ascorbic acid, vitamin C, (VITAMIN C) 250 MG tablet, Take 1,000 mg by mouth once daily., Disp: , Rfl:     aspirin (ECOTRIN) 81 MG EC tablet, Take 81 mg by mouth once daily., Disp: , Rfl:     b complex vitamins capsule, Take 1 capsule by mouth once daily., Disp: , Rfl:     BASAGLAR KWIKPEN U-100 INSULIN 100 unit/mL (3 mL) InPn pen, Inject 20 Units into the skin once daily. Taking at night., Disp: , Rfl:     carvediloL (COREG) 25 MG tablet, TAKE 1 TABLET BY MOUTH TWICE A DAY WITH FOOD, Disp: 180 tablet, Rfl: 3    co-enzyme Q-10 30 mg capsule, Take 30 mg by mouth 3 (three) times daily., Disp: , Rfl:     felodipine (PLENDIL) 10 MG 24 hr tablet, Take 1 tablet (10 mg total) by mouth once daily. (Patient taking differently: Take 10 mg by mouth every evening.), Disp: 30  tablet, Rfl: 11    magnesium 250 mg Tab, Take 1,500 mg by mouth., Disp: , Rfl:     omega-3 fatty acids/fish oil (FISH OIL-OMEGA-3 FATTY ACIDS) 300-1,000 mg capsule, Take by mouth once daily., Disp: , Rfl:     pravastatin (PRAVACHOL) 80 MG tablet, Take 1 tablet (80 mg total) by mouth once daily., Disp: 90 tablet, Rfl: 3    sodium bicarbonate 650 MG tablet, Take 1 tablet (650 mg total) by mouth once daily., Disp: 30 tablet, Rfl: 11    vitamin D (VITAMIN D3) 1000 units Tab, Take 1,000 Units by mouth once daily., Disp: , Rfl:     ferrous sulfate (FEOSOL) 325 mg (65 mg iron) Tab tablet, Take 1 tablet (325 mg total) by mouth 2 (two) times daily., Disp: 60 tablet, Rfl: 11    INSULIN DETEMIR (LEVEMIR SUBQ), Inject 17 Units into the skin nightly. (Patient not taking: Reported on 3/5/2025), Disp: , Rfl:     irbesartan (AVAPRO) 150 MG tablet, TAKE 1 TABLET BY MOUTH EVERY EVENING., Disp: 90 tablet, Rfl: 3    levocarnitine (L-CARNITINE ORAL), Take by mouth. (Patient not taking: Reported on 3/5/2025), Disp: , Rfl:     SITagliptan-metformin (JANUMET)  mg per tablet, Take 1 tablet by mouth 2 (two) times daily with meals., Disp: , Rfl:     [START ON 3/10/2025] sodium zirconium cyclosilicate (LOKELMA) 10 gram packet, Take 1 packet (10 g total) by mouth twice a week. Mix entire contents of packet(s) into drinking glass containing 3 tablespoons of water; stir well and drink immediately. Add water and repeat until no powder remains to receive entire dose., Disp: 30 packet, Rfl: 3

## 2025-03-06 ENCOUNTER — LAB VISIT (OUTPATIENT)
Dept: LAB | Facility: HOSPITAL | Age: 70
End: 2025-03-06
Payer: COMMERCIAL

## 2025-03-06 DIAGNOSIS — E87.5 HYPERKALEMIA: ICD-10-CM

## 2025-03-06 DIAGNOSIS — N18.32 STAGE 3B CHRONIC KIDNEY DISEASE: ICD-10-CM

## 2025-03-06 DIAGNOSIS — N25.81 SECONDARY RENAL HYPERPARATHYROIDISM: ICD-10-CM

## 2025-03-06 DIAGNOSIS — E87.20 ACIDOSIS: ICD-10-CM

## 2025-03-06 LAB
ALBUMIN SERPL BCP-MCNC: 3.6 G/DL (ref 3.5–5.2)
ANION GAP SERPL CALC-SCNC: 7 MMOL/L (ref 8–16)
BUN SERPL-MCNC: 24 MG/DL (ref 8–23)
CALCIUM SERPL-MCNC: 9.1 MG/DL (ref 8.7–10.5)
CHLORIDE SERPL-SCNC: 113 MMOL/L (ref 95–110)
CO2 SERPL-SCNC: 21 MMOL/L (ref 23–29)
CREAT SERPL-MCNC: 1.4 MG/DL (ref 0.5–1.4)
EST. GFR  (NO RACE VARIABLE): 41 ML/MIN/1.73 M^2
GLUCOSE SERPL-MCNC: 133 MG/DL (ref 70–110)
PHOSPHATE SERPL-MCNC: 3.5 MG/DL (ref 2.7–4.5)
POTASSIUM SERPL-SCNC: 4.6 MMOL/L (ref 3.5–5.1)
SODIUM SERPL-SCNC: 141 MMOL/L (ref 136–145)

## 2025-03-06 PROCEDURE — 36415 COLL VENOUS BLD VENIPUNCTURE: CPT | Mod: PO | Performed by: INTERNAL MEDICINE

## 2025-03-06 PROCEDURE — 80069 RENAL FUNCTION PANEL: CPT | Performed by: INTERNAL MEDICINE

## 2025-03-07 DIAGNOSIS — N18.32 STAGE 3B CHRONIC KIDNEY DISEASE: Primary | ICD-10-CM

## 2025-03-07 RX ORDER — IRBESARTAN 150 MG/1
150 TABLET ORAL NIGHTLY
Qty: 90 TABLET | Refills: 3 | Status: SHIPPED | OUTPATIENT
Start: 2025-03-07

## 2025-03-07 NOTE — ASSESSMENT & PLAN NOTE
- bicarb 25 --> 18 today; worsening  - continue bicarb 650mg QD for now; will increase at next visit if remains low

## 2025-03-07 NOTE — ASSESSMENT & PLAN NOTE
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 Holding irbesartan and giving Lokelma 10g daily  - repeat labs tomorrow. If K improved, will restart irbesartan and decrease Lokelma to twice weekly  - low K diet; previously provided handout  - continue NaBicarb

## 2025-03-07 NOTE — ASSESSMENT & PLAN NOTE
- baseline Cr 1.1-1.5 since 2017; clinically due to diabetic nephropathy   - Cr 1.4 --> 1.7 today; associated with hyperK and acidosis. Will repeat  - UPCR 0.17 --> 0.24 --> 0.16; negative.   - currently holding irbesartan due to hyperK. If next K improved, will restart irbesartan  - continue excellent water intake  - previously offered SGLT2i; would need to monitor for hypoglycemia. Will hold for now and reconsider if proteinuria worsens  - okay to continue metformin; will stop once GFR < 30%

## 2025-03-07 NOTE — ASSESSMENT & PLAN NOTE
- hgb 8.7 --> 9.4 --> 9.0; stable  - Tsat 19%, ferritin 117; unchanged  - increase iron from PRN to BID  - f/u with GI

## 2025-03-17 DIAGNOSIS — D63.1 ANEMIA OF CHRONIC RENAL FAILURE, STAGE 3B: ICD-10-CM

## 2025-03-17 DIAGNOSIS — E87.20 ACIDOSIS: ICD-10-CM

## 2025-03-17 DIAGNOSIS — E87.5 HYPERKALEMIA: ICD-10-CM

## 2025-03-17 DIAGNOSIS — N25.81 SECONDARY RENAL HYPERPARATHYROIDISM: ICD-10-CM

## 2025-03-17 DIAGNOSIS — N18.32 ANEMIA OF CHRONIC RENAL FAILURE, STAGE 3B: ICD-10-CM

## 2025-03-17 DIAGNOSIS — N18.32 STAGE 3B CHRONIC KIDNEY DISEASE: ICD-10-CM

## 2025-03-18 RX ORDER — SODIUM BICARBONATE 650 MG/1
650 TABLET ORAL DAILY
Qty: 90 TABLET | Refills: 3 | Status: SHIPPED | OUTPATIENT
Start: 2025-03-18 | End: 2026-03-18

## 2025-03-18 NOTE — TELEPHONE ENCOUNTER
Sent to Dr. Loera  LOV-3/5/2025  RTC- Waitlisted around (4/16/2025)//(Scheduled,6/26/2025)  Last Prescribed-3/12/2024

## 2025-03-19 ENCOUNTER — TELEPHONE (OUTPATIENT)
Dept: CARDIOLOGY | Facility: CLINIC | Age: 70
End: 2025-03-19
Payer: COMMERCIAL

## 2025-03-20 ENCOUNTER — LAB VISIT (OUTPATIENT)
Dept: LAB | Facility: HOSPITAL | Age: 70
End: 2025-03-20
Payer: COMMERCIAL

## 2025-03-20 DIAGNOSIS — N18.32 STAGE 3B CHRONIC KIDNEY DISEASE: ICD-10-CM

## 2025-03-20 LAB
ALBUMIN SERPL BCP-MCNC: 3.7 G/DL (ref 3.5–5.2)
ANION GAP SERPL CALC-SCNC: 8 MMOL/L (ref 8–16)
BUN SERPL-MCNC: 28 MG/DL (ref 8–23)
CALCIUM SERPL-MCNC: 9.1 MG/DL (ref 8.7–10.5)
CHLORIDE SERPL-SCNC: 113 MMOL/L (ref 95–110)
CO2 SERPL-SCNC: 19 MMOL/L (ref 23–29)
CREAT SERPL-MCNC: 1.5 MG/DL (ref 0.5–1.4)
EST. GFR  (NO RACE VARIABLE): 37.7 ML/MIN/1.73 M^2
GLUCOSE SERPL-MCNC: 194 MG/DL (ref 70–110)
PHOSPHATE SERPL-MCNC: 3 MG/DL (ref 2.7–4.5)
POTASSIUM SERPL-SCNC: 4.9 MMOL/L (ref 3.5–5.1)
SODIUM SERPL-SCNC: 140 MMOL/L (ref 136–145)

## 2025-03-20 PROCEDURE — 80069 RENAL FUNCTION PANEL: CPT | Performed by: INTERNAL MEDICINE

## 2025-03-20 PROCEDURE — 36415 COLL VENOUS BLD VENIPUNCTURE: CPT | Mod: PO | Performed by: INTERNAL MEDICINE

## 2025-03-21 ENCOUNTER — RESULTS FOLLOW-UP (OUTPATIENT)
Dept: NEPHROLOGY | Facility: CLINIC | Age: 70
End: 2025-03-21

## 2025-03-21 ENCOUNTER — TELEPHONE (OUTPATIENT)
Dept: NEPHROLOGY | Facility: CLINIC | Age: 70
End: 2025-03-21
Payer: COMMERCIAL

## 2025-03-21 NOTE — TELEPHONE ENCOUNTER
----- Message from Cari Loera MD sent at 3/21/2025  3:12 PM CDT -----  Potassium level creeping up but okay to continue current regimen for now.  Continue irbesartan and Lokelma twice weekly.     Can we get RFP in 4 weeks?    Thanks!  ----- Message -----  From: Erik, [facility]  Sent: 3/20/2025  11:12 PM CDT  To: Cari Loera MD

## 2025-03-21 NOTE — PROGRESS NOTES
Potassium level creeping up but okay to continue current regimen for now.  Continue irbesartan and Lokelma twice weekly.     Can we get RFP in 4 weeks?    Thanks!

## 2025-03-21 NOTE — TELEPHONE ENCOUNTER
Patient contacted in reference of labs viewed per Dr. Saleh. Instructed to continue current regimen as of now. Labs arranged within the next week.

## 2025-03-24 DIAGNOSIS — N18.32 STAGE 3B CHRONIC KIDNEY DISEASE: Primary | ICD-10-CM

## 2025-04-07 ENCOUNTER — TELEPHONE (OUTPATIENT)
Dept: VASCULAR SURGERY | Facility: CLINIC | Age: 70
End: 2025-04-07
Payer: COMMERCIAL

## 2025-04-07 DIAGNOSIS — I65.23 BILATERAL CAROTID ARTERY STENOSIS: Primary | ICD-10-CM

## 2025-04-07 RX ORDER — PRAVASTATIN SODIUM 80 MG/1
80 TABLET ORAL DAILY
Qty: 90 TABLET | Refills: 3 | Status: CANCELLED | OUTPATIENT
Start: 2025-04-07 | End: 2026-04-07

## 2025-04-07 NOTE — TELEPHONE ENCOUNTER
----- Message from Lalito sent at 4/7/2025  1:06 PM CDT -----  Regarding: Refill  Contact: [phone]  Hi,Who called:The pt Reason:Pt is requesting a refill on the pravastatin (PRAVACHOL) 80 MG tablet:[facility]. Provider's name:Dr. Martinez Additional Information: Thank you.

## 2025-04-07 NOTE — TELEPHONE ENCOUNTER
Contacted pt in response to message requesting refill of pravastain. Explained to pt that although this was prescribed by Dr. Martinez pt must request refill from PCP or cardiologist as this is not a medication that is monitored or managed by vascular surgery. Pt verbalized understanding and states she will request refill from PCP. FU appointment scheduled, pt verified. Appointment letter placed in mail.

## 2025-04-08 ENCOUNTER — LAB VISIT (OUTPATIENT)
Dept: LAB | Facility: HOSPITAL | Age: 70
End: 2025-04-08
Attending: INTERNAL MEDICINE
Payer: COMMERCIAL

## 2025-04-08 DIAGNOSIS — N18.32 STAGE 3B CHRONIC KIDNEY DISEASE: ICD-10-CM

## 2025-04-08 DIAGNOSIS — I10 ESSENTIAL HYPERTENSION: ICD-10-CM

## 2025-04-08 DIAGNOSIS — N25.81 SECONDARY RENAL HYPERPARATHYROIDISM: ICD-10-CM

## 2025-04-08 DIAGNOSIS — E11.69 TYPE 2 DIABETES MELLITUS WITH OTHER SPECIFIED COMPLICATION, WITH LONG-TERM CURRENT USE OF INSULIN: ICD-10-CM

## 2025-04-08 DIAGNOSIS — I67.9 CVD (CEREBROVASCULAR DISEASE): ICD-10-CM

## 2025-04-08 DIAGNOSIS — E87.5 HYPERKALEMIA: ICD-10-CM

## 2025-04-08 DIAGNOSIS — Z79.4 TYPE 2 DIABETES MELLITUS WITH OTHER SPECIFIED COMPLICATION, WITH LONG-TERM CURRENT USE OF INSULIN: ICD-10-CM

## 2025-04-08 DIAGNOSIS — E87.20 ACIDOSIS: ICD-10-CM

## 2025-04-08 LAB
ABSOLUTE EOSINOPHIL (OHS): 0.08 K/UL
ABSOLUTE MONOCYTE (OHS): 0.57 K/UL (ref 0.3–1)
ABSOLUTE NEUTROPHIL COUNT (OHS): 3.63 K/UL (ref 1.8–7.7)
BASOPHILS # BLD AUTO: 0.01 K/UL
BASOPHILS NFR BLD AUTO: 0.2 %
ERYTHROCYTE [DISTWIDTH] IN BLOOD BY AUTOMATED COUNT: 13.1 % (ref 11.5–14.5)
HCT VFR BLD AUTO: 28.6 % (ref 37–48.5)
HGB BLD-MCNC: 8.7 GM/DL (ref 12–16)
IMM GRANULOCYTES # BLD AUTO: 0.02 K/UL (ref 0–0.04)
IMM GRANULOCYTES NFR BLD AUTO: 0.3 % (ref 0–0.5)
LYMPHOCYTES # BLD AUTO: 1.67 K/UL (ref 1–4.8)
MCH RBC QN AUTO: 27.5 PG (ref 27–31)
MCHC RBC AUTO-ENTMCNC: 30.4 G/DL (ref 32–36)
MCV RBC AUTO: 91 FL (ref 82–98)
NUCLEATED RBC (/100WBC) (OHS): 0 /100 WBC
PLATELET # BLD AUTO: 217 K/UL (ref 150–450)
PMV BLD AUTO: 10.8 FL (ref 9.2–12.9)
PTH-INTACT SERPL-MCNC: 109.2 PG/ML (ref 9–77)
RBC # BLD AUTO: 3.16 M/UL (ref 4–5.4)
RELATIVE EOSINOPHIL (OHS): 1.3 %
RELATIVE LYMPHOCYTE (OHS): 27.9 % (ref 18–48)
RELATIVE MONOCYTE (OHS): 9.5 % (ref 4–15)
RELATIVE NEUTROPHIL (OHS): 60.8 % (ref 38–73)
URATE SERPL-MCNC: 4.9 MG/DL (ref 2.4–5.7)
WBC # BLD AUTO: 5.98 K/UL (ref 3.9–12.7)

## 2025-04-08 PROCEDURE — 36415 COLL VENOUS BLD VENIPUNCTURE: CPT | Mod: PO

## 2025-04-08 PROCEDURE — 85025 COMPLETE CBC W/AUTO DIFF WBC: CPT

## 2025-04-08 PROCEDURE — 83970 ASSAY OF PARATHORMONE: CPT

## 2025-04-08 PROCEDURE — 84550 ASSAY OF BLOOD/URIC ACID: CPT

## 2025-04-08 PROCEDURE — 81003 URINALYSIS AUTO W/O SCOPE: CPT

## 2025-04-08 RX ORDER — CARVEDILOL 25 MG/1
25 TABLET ORAL 2 TIMES DAILY WITH MEALS
Qty: 180 TABLET | Refills: 1 | Status: SHIPPED | OUTPATIENT
Start: 2025-04-08 | End: 2025-10-05

## 2025-04-09 LAB
BACTERIA #/AREA URNS AUTO: NORMAL /HPF
BILIRUB UR QL STRIP.AUTO: NEGATIVE
CLARITY UR: CLEAR
COLOR UR AUTO: YELLOW
GLUCOSE UR QL STRIP: NEGATIVE
HGB UR QL STRIP: NEGATIVE
KETONES UR QL STRIP: NEGATIVE
LEUKOCYTE ESTERASE UR QL STRIP: NEGATIVE
MICROSCOPIC COMMENT: NORMAL
NITRITE UR QL STRIP: POSITIVE
PH UR STRIP: 6 [PH]
PROT UR QL STRIP: NEGATIVE
RBC #/AREA URNS AUTO: 1 /HPF (ref 0–4)
SP GR UR STRIP: 1.01
SQUAMOUS #/AREA URNS AUTO: <1 /HPF
UROBILINOGEN UR STRIP-ACNC: NEGATIVE EU/DL
WBC #/AREA URNS AUTO: 1 /HPF (ref 0–5)

## 2025-04-14 ENCOUNTER — OFFICE VISIT (OUTPATIENT)
Dept: NEPHROLOGY | Facility: CLINIC | Age: 70
End: 2025-04-14
Payer: COMMERCIAL

## 2025-04-14 VITALS
RESPIRATION RATE: 18 BRPM | DIASTOLIC BLOOD PRESSURE: 78 MMHG | BODY MASS INDEX: 25.12 KG/M2 | SYSTOLIC BLOOD PRESSURE: 128 MMHG | WEIGHT: 156.31 LBS | HEIGHT: 66 IN | OXYGEN SATURATION: 97 % | HEART RATE: 66 BPM

## 2025-04-14 DIAGNOSIS — E87.5 HYPERKALEMIA: ICD-10-CM

## 2025-04-14 DIAGNOSIS — N18.32 ANEMIA OF CHRONIC RENAL FAILURE, STAGE 3B: ICD-10-CM

## 2025-04-14 DIAGNOSIS — D63.1 ANEMIA OF CHRONIC RENAL FAILURE, STAGE 3B: ICD-10-CM

## 2025-04-14 DIAGNOSIS — N18.32 STAGE 3B CHRONIC KIDNEY DISEASE: Primary | ICD-10-CM

## 2025-04-14 DIAGNOSIS — N25.81 SECONDARY RENAL HYPERPARATHYROIDISM: ICD-10-CM

## 2025-04-14 DIAGNOSIS — E87.20 ACIDOSIS: ICD-10-CM

## 2025-04-14 PROCEDURE — 1125F AMNT PAIN NOTED PAIN PRSNT: CPT | Mod: CPTII,S$GLB,, | Performed by: INTERNAL MEDICINE

## 2025-04-14 PROCEDURE — 1101F PT FALLS ASSESS-DOCD LE1/YR: CPT | Mod: CPTII,S$GLB,, | Performed by: INTERNAL MEDICINE

## 2025-04-14 PROCEDURE — G2211 COMPLEX E/M VISIT ADD ON: HCPCS | Mod: S$GLB,,, | Performed by: INTERNAL MEDICINE

## 2025-04-14 PROCEDURE — 4010F ACE/ARB THERAPY RXD/TAKEN: CPT | Mod: CPTII,S$GLB,, | Performed by: INTERNAL MEDICINE

## 2025-04-14 PROCEDURE — 3078F DIAST BP <80 MM HG: CPT | Mod: CPTII,S$GLB,, | Performed by: INTERNAL MEDICINE

## 2025-04-14 PROCEDURE — 1159F MED LIST DOCD IN RCRD: CPT | Mod: CPTII,S$GLB,, | Performed by: INTERNAL MEDICINE

## 2025-04-14 PROCEDURE — 3074F SYST BP LT 130 MM HG: CPT | Mod: CPTII,S$GLB,, | Performed by: INTERNAL MEDICINE

## 2025-04-14 PROCEDURE — 99999 PR PBB SHADOW E&M-EST. PATIENT-LVL V: CPT | Mod: PBBFAC,,, | Performed by: INTERNAL MEDICINE

## 2025-04-14 PROCEDURE — 99214 OFFICE O/P EST MOD 30 MIN: CPT | Mod: S$GLB,,, | Performed by: INTERNAL MEDICINE

## 2025-04-14 PROCEDURE — 3008F BODY MASS INDEX DOCD: CPT | Mod: CPTII,S$GLB,, | Performed by: INTERNAL MEDICINE

## 2025-04-14 PROCEDURE — 1160F RVW MEDS BY RX/DR IN RCRD: CPT | Mod: CPTII,S$GLB,, | Performed by: INTERNAL MEDICINE

## 2025-04-14 PROCEDURE — 3288F FALL RISK ASSESSMENT DOCD: CPT | Mod: CPTII,S$GLB,, | Performed by: INTERNAL MEDICINE

## 2025-04-14 PROCEDURE — 3066F NEPHROPATHY DOC TX: CPT | Mod: CPTII,S$GLB,, | Performed by: INTERNAL MEDICINE

## 2025-04-14 RX ORDER — DORZOLAMIDE HYDROCHLORIDE AND TIMOLOL MALEATE 20; 5 MG/ML; MG/ML
SOLUTION/ DROPS OPHTHALMIC
COMMUNITY

## 2025-04-14 RX ORDER — FERROUS SULFATE 325(65) MG
325 TABLET ORAL 2 TIMES DAILY
Qty: 60 TABLET | Refills: 11 | Status: SHIPPED | OUTPATIENT
Start: 2025-04-14

## 2025-04-14 RX ORDER — BRIMONIDINE TARTRATE 2 MG/ML
SOLUTION/ DROPS OPHTHALMIC
COMMUNITY

## 2025-04-14 RX ORDER — SODIUM BICARBONATE 650 MG/1
650 TABLET ORAL 2 TIMES DAILY
Qty: 180 TABLET | Refills: 3 | Status: SHIPPED | OUTPATIENT
Start: 2025-04-14 | End: 2026-04-14

## 2025-04-14 NOTE — ASSESSMENT & PLAN NOTE
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

## 2025-04-14 NOTE — ASSESSMENT & PLAN NOTE
- hgb 9.4 --> 9.0 --> 8.7; worsening  - last Tsat 19%, ferritin 117. Recent CSC 3/2025 reportedly normal  - patient not taking iron BID as prescribed  - patient continues to decline IV iron. Continue iron PO BID as prescribed

## 2025-04-14 NOTE — ASSESSMENT & PLAN NOTE
- baseline Cr 1.1-1.5 since 2017; clinically due to diabetic nephropathy   - Cr 1.7 --> 1.3 today; improved and back to baseline  - UPCR 0.24 --> 0.16 --> 0.35; positive today. Recently restarted irbesartan. Will trend for now. Low threshold to add SGLT2i if worsens   - continue excellent water intake  - okay to continue metformin; will stop once GFR < 30%

## 2025-04-14 NOTE — PATIENT INSTRUCTIONS
"Start taking iron tablets one pill twice daily EVERY day.  Increase sodium bicarbonate to two tablets per day. The pill bottle will say "one tablet twice daily" but it is okay to take both tablets once daily.     "

## 2025-04-14 NOTE — PROGRESS NOTES
[facility]  [address] [facility], [address]  [place]  [phone]    PCP: Shyann Peraza MD  Cardiologist: ESTUARDO Loving       HPI: Ms. Erin Hahn is a 69 y.o. female with HTN, T2DM, carotid stenosis s/p CEA 2/2023, and DLD who is here for established patient evaluation of Chronic Kidney Disease  She was referred on 2/6/24 for CKD and hyperkalemia. She reports her cardiologist wants to start her on losartan but her K has been running high.  Her baseline Cr appears to be 1.1-1.3 (or possibly 1.1-1.5) with GFR in the 40-50s since 2017. No proteinuria (last UA in 2021). Labs with intermittent hyperkalemia since 2018; not usually associated with metabolic acidosis.      She reports she was diagnosed with T2DM > 15 years ago. Chart review reveals an A1c of 8.5% on 3/2020 but A1c has been </= 7% since 2021. She was diagnosed with HTN at the same time as T2DM. She notes her DM has been harder to control. BP has always been controlled. She denies peripheral edema, though did have an episode of L foot swelling about 3 weeks ago.   She denies h/o gout, nephrolithiasis, NSAID use, or family h/o kidney disease. She describes an episode of hydronephrosis ~4-5 years ago; this was managed by Dr. Patino at .   She reports good water intake: she drinks ~46oz at work and another 32-36 oz when she gets home. She avoid spinach and tomatoes due to her high K.   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      2/6/24: stopped chlorthalidone due to borderline high Na and K. Continued good water intake.     3/12/24: started irbesartan 150mg daily, lokelma 10g QOD, NaBicarb 650mg daily. Provided low K handout. F/u labs on 4/15 with stable K and Cr.      5/14/24: started iron BID. Not on lokelma due to cost ($400).      8/6/24: provided low K diet handout.   Labs on 2/27 with K 6.0; provided 7-day trial of Lokelma.      Interval Hx:   LV 3/5/25: didn't start Lokelma until 3/4. Increased  "iron from PRN to BID. Repeated labs 3/6; K 4.6. Restarted irbesartan and decreased Lokelma from daily to BIW.   Today she reports to be doing okay. Main concern is hip pain s/p fall last year; considering going to ED today for treatment.   BP controlled at home. No leg swelling. Reports compliance with irbesartan and lokelma. Not taking iron tablets; not interested in IV iron. S/p recent colonoscopy which was unremarkable.   Reports good water intake; 6-8 glasses per day. Notes BLE cramps on days she doesn't drink as much water.       ROS:  Complete ROS otherwise negative except as indicated above.       Current Medications[1]      Vitals: Blood pressure 128/78, pulse 66, resp. rate 18, height 5' 6" (1.676 m), weight 70.9 kg (156 lb 4.9 oz), SpO2 97%. Body mass index is 25.23 kg/m².    Physical Exam  Vitals reviewed.   Constitutional:       General: She is awake. She is not in acute distress.     Appearance: Normal appearance. She is well-developed.   HENT:      Head: Normocephalic and atraumatic.      Nose: Nose normal.      Mouth/Throat:      Mouth: Mucous membranes are moist.   Eyes:      Extraocular Movements: Extraocular movements intact.      Conjunctiva/sclera: Conjunctivae normal.   Pulmonary:      Effort: Pulmonary effort is normal.   Musculoskeletal:         General: No tenderness or signs of injury.      Right lower leg: No edema.      Left lower leg: No edema.   Skin:     General: Skin is warm and dry.      Findings: No erythema or rash.   Neurological:      General: No focal deficit present.      Mental Status: She is alert. Mental status is at baseline.   Psychiatric:         Mood and Affect: Mood normal.         Behavior: Behavior normal.           Labs/Imaging:  Sodium   Date Value Ref Range Status   04/08/2025 136 136 - 145 mmol/L Final   03/20/2025 140 136 - 145 mmol/L Final   03/06/2025 141 136 - 145 mmol/L Final   02/26/2025 139 136 - 145 mmol/L Final     Potassium   Date Value Ref Range Status "   04/08/2025 4.9 3.5 - 5.1 mmol/L Final   03/20/2025 4.9 3.5 - 5.1 mmol/L Final   03/06/2025 4.6 3.5 - 5.1 mmol/L Final   02/26/2025 6.0 (H) 3.5 - 5.1 mmol/L Final     Comment:     *No Visible Hemolysis     Chloride   Date Value Ref Range Status   04/08/2025 108 95 - 110 mmol/L Final   03/20/2025 113 (H) 95 - 110 mmol/L Final   03/06/2025 113 (H) 95 - 110 mmol/L Final   02/26/2025 111 (H) 95 - 110 mmol/L Final     CO2   Date Value Ref Range Status   04/08/2025 20 (L) 23 - 29 mmol/L Final   03/20/2025 19 (L) 23 - 29 mmol/L Final   03/06/2025 21 (L) 23 - 29 mmol/L Final   02/26/2025 18 (L) 23 - 29 mmol/L Final     BUN   Date Value Ref Range Status   04/08/2025 25 (H) 8 - 23 mg/dL Final   03/20/2025 28 (H) 8 - 23 mg/dL Final   03/06/2025 24 (H) 8 - 23 mg/dL Final   02/26/2025 26 (H) 8 - 23 mg/dL Final     Creatinine   Date Value Ref Range Status   04/08/2025 1.3 0.5 - 1.4 mg/dL Final   03/20/2025 1.5 (H) 0.5 - 1.4 mg/dL Final   03/06/2025 1.4 0.5 - 1.4 mg/dL Final   02/26/2025 1.7 (H) 0.5 - 1.4 mg/dL Final     eGFR   Date Value Ref Range Status   04/08/2025 45 (L) >60 mL/min/1.73/m2 Final     Comment:     Estimated GFR calculated using the CKD-EPI creatinine (2021) equation.   03/20/2025 37.7 (A) >60 mL/min/1.73 m^2 Final   03/06/2025 41.0 (A) >60 mL/min/1.73 m^2 Final   02/26/2025 32.5 (A) >60 mL/min/1.73 m^2 Final     Glucose   Date Value Ref Range Status   04/08/2025 170 (H) 70 - 110 mg/dL Final     Calcium   Date Value Ref Range Status   04/08/2025 9.0 8.7 - 10.5 mg/dL Final   03/20/2025 9.1 8.7 - 10.5 mg/dL Final   03/06/2025 9.1 8.7 - 10.5 mg/dL Final   02/26/2025 9.5 8.7 - 10.5 mg/dL Final     Phosphorus Level   Date Value Ref Range Status   04/08/2025 3.2 2.7 - 4.5 mg/dL Final     Phosphorus   Date Value Ref Range Status   03/20/2025 3.0 2.7 - 4.5 mg/dL Final   03/06/2025 3.5 2.7 - 4.5 mg/dL Final   02/26/2025 4.6 (H) 2.7 - 4.5 mg/dL Final     Albumin   Date Value Ref Range Status   04/08/2025 3.5 3.5 - 5.2  g/dL Final   03/20/2025 3.7 3.5 - 5.2 g/dL Final   03/06/2025 3.6 3.5 - 5.2 g/dL Final   02/26/2025 3.8 3.5 - 5.2 g/dL Final       PTH, Intact   Date Value Ref Range Status   02/26/2025 143.8 (H) 9.0 - 77.0 pg/mL Final   05/11/2024 124.2 (H) 9.0 - 77.0 pg/mL Final   03/09/2024 75.5 9.0 - 77.0 pg/mL Final     PTH Intact   Date Value Ref Range Status   04/08/2025 109.2 (H) 9.0 - 77.0 pg/mL Final     Vit D, 25-Hydroxy   Date Value Ref Range Status   04/28/2020 73 30 - 96 ng/mL Final     Comment:     Vitamin D deficiency.........<10 ng/mL                              Vitamin D insufficiency......10-29 ng/mL       Vitamin D sufficiency........> or equal to 30 ng/mL  Vitamin D toxicity............>100 ng/mL       Uric Acid   Date Value Ref Range Status   02/26/2025 5.5 2.4 - 5.7 mg/dL Final   05/11/2024 6.1 (H) 2.4 - 5.7 mg/dL Final   03/09/2024 5.6 2.4 - 5.7 mg/dL Final       Hemoglobin   Date Value Ref Range Status   02/26/2025 9.0 (L) 12.0 - 16.0 g/dL Final   05/11/2024 8.7 (L) 12.0 - 16.0 g/dL Final   03/09/2024 9.5 (L) 12.0 - 16.0 g/dL Final     HGB   Date Value Ref Range Status   04/08/2025 8.7 (L) 12.0 - 16.0 gm/dL Final       Urine Protein/Creatinine Ratio   Date Value Ref Range Status   04/08/2025 0.35 (H) <=0.20 Final     Prot/Creat Ratio, Urine   Date Value Ref Range Status   02/26/2025 0.16 0.00 - 0.20 Final   07/20/2024 0.24 (H) 0.00 - 0.20 Final   05/11/2024 0.17 0.00 - 0.20 Final         7/11/24:   Na 142, K 5.3, Cl 112, CO2 25, BUN 27, Cr 1.43, GFR 40%, Ca 9.7, Alb 4.2, Glu 123  PTH 80, vitamin D 44, A1c 6.5  WBC 6.0, hgb 9.4, plt 243. Tsat 21%, ferritin 60  ACR 61        Renal US: 3/9/24:   Right kidney: The right kidney measures 10.4 cm. No cortical thinning. No loss of corticomedullary distinction. Resistive index measures 0.80.  No mass. No renal stone. No hydronephrosis.     Left kidney: The left kidney measures 10.1 cm. No cortical thinning. No loss of corticomedullary distinction. Resistive index  measures 0.83.  No mass. No renal stone. No hydronephrosis.      Impression/Plan:    Stage 3b chronic kidney disease  - baseline Cr 1.1-1.5 since 2017; clinically due to diabetic nephropathy   - Cr 1.7 --> 1.3 today; improved and back to baseline  - UPCR 0.24 --> 0.16 --> 0.35; positive today. Recently restarted irbesartan. Will trend for now. 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 Recent CSC 3/2025 reportedly normal  - patient not taking iron BID as prescribed  - patient continues to decline IV iron. Continue iron PO BID as prescribed    Secondary renal hyperparathyroidism  -  --> 109; stable. CCa and phos normal. Will trend.         Visit today included increased complexity associated with the care of the episodic problem anemia addressed and managing the longitudinal care of the patient due to the serious and/or complex managed problem(s) CKD.      Treatment options and plan were discussed with the patient and/or caregiver.   RTC 2-3 months.       Cari Loera MD  [facility]  [phone]         [1]   Current Outpatient Medications:     ascorbic acid, vitamin C, (VITAMIN C) 250 MG tablet, Take 1,000 mg by mouth once daily., Disp: , Rfl:     aspirin (ECOTRIN) 81 MG EC tablet, Take 81 mg by mouth once daily., Disp: , Rfl:     b complex vitamins capsule, Take 1 capsule by mouth once daily., Disp: , Rfl:     BASAGLAR KWIKPEN U-100 INSULIN 100 unit/mL (3  mL) InPn pen, Inject 20 Units into the skin once daily. Taking at night., Disp: , Rfl:     brimonidine 0.2% (ALPHAGAN) 0.2 % Drop, 5, Disp: , Rfl:     carvediloL (COREG) 25 MG tablet, Take 1 tablet (25 mg total) by mouth 2 (two) times daily with meals., Disp: 180 tablet, Rfl: 1    co-enzyme Q-10 30 mg capsule, Take 30 mg by mouth 3 (three) times daily., Disp: , Rfl:     dorzolamide-timolol 2-0.5% (COSOPT) 22.3-6.8 mg/mL ophthalmic solution, 30, Disp: , Rfl:     felodipine (PLENDIL) 10 MG 24 hr tablet, Take 1 tablet (10 mg total) by mouth once daily. (Patient taking differently: Take 10 mg by mouth every evening.), Disp: 30 tablet, Rfl: 11    irbesartan (AVAPRO) 150 MG tablet, TAKE 1 TABLET BY MOUTH EVERY EVENING., Disp: 90 tablet, Rfl: 3    magnesium 250 mg Tab, Take 1,500 mg by mouth., Disp: , Rfl:     omega-3 fatty acids/fish oil (FISH OIL-OMEGA-3 FATTY ACIDS) 300-1,000 mg capsule, Take by mouth once daily., Disp: , Rfl:     SITagliptan-metformin (JANUMET)  mg per tablet, Take 1 tablet by mouth 2 (two) times daily with meals., Disp: , Rfl:     sodium zirconium cyclosilicate (LOKELMA) 10 gram packet, Take 1 packet (10 g total) by mouth twice a week. Mix entire contents of packet(s) into drinking glass containing 3 tablespoons of water; stir well and drink immediately. Add water and repeat until no powder remains to receive entire dose., Disp: 30 packet, Rfl: 3    vitamin D (VITAMIN D3) 1000 units Tab, Take 1,000 Units by mouth once daily., Disp: , Rfl:     ferrous sulfate (FEOSOL) 325 mg (65 mg iron) Tab tablet, Take 1 tablet (325 mg total) by mouth 2 (two) times daily., Disp: 60 tablet, Rfl: 11    INSULIN DETEMIR (LEVEMIR SUBQ), Inject 17 Units into the skin nightly. (Patient not taking: Reported on 5/14/2024), Disp: , Rfl:     levocarnitine (L-CARNITINE ORAL), Take by mouth. (Patient not taking: Reported on 4/14/2025), Disp: , Rfl:     pravastatin (PRAVACHOL) 80 MG tablet, Take 1 tablet (80 mg total) by  mouth once daily., Disp: 90 tablet, Rfl: 3    sodium bicarbonate 650 MG tablet, Take 1 tablet (650 mg total) by mouth 2 (two) times daily., Disp: 180 tablet, Rfl: 3

## 2025-05-06 ENCOUNTER — HOSPITAL ENCOUNTER (OUTPATIENT)
Dept: VASCULAR SURGERY | Facility: CLINIC | Age: 70
Discharge: HOME | End: 2025-05-06
Attending: SURGERY
Payer: COMMERCIAL

## 2025-05-06 ENCOUNTER — OFFICE VISIT (OUTPATIENT)
Dept: VASCULAR SURGERY | Facility: CLINIC | Age: 70
End: 2025-05-06
Attending: SURGERY
Payer: COMMERCIAL

## 2025-05-06 VITALS
TEMPERATURE: 98 F | BODY MASS INDEX: 25.15 KG/M2 | DIASTOLIC BLOOD PRESSURE: 75 MMHG | HEART RATE: 64 BPM | HEIGHT: 66 IN | SYSTOLIC BLOOD PRESSURE: 128 MMHG | WEIGHT: 156.5 LBS

## 2025-05-06 DIAGNOSIS — Z98.890 S/P CAROTID ENDARTERECTOMY: Primary | ICD-10-CM

## 2025-05-06 DIAGNOSIS — I65.23 BILATERAL CAROTID ARTERY STENOSIS: ICD-10-CM

## 2025-05-06 PROCEDURE — 99999 PR PBB SHADOW E&M-EST. PATIENT-LVL V: CPT | Mod: PBBFAC,,, | Performed by: SURGERY

## 2025-05-06 PROCEDURE — 93880 EXTRACRANIAL BILAT STUDY: CPT | Mod: S$GLB,,, | Performed by: SURGERY

## 2025-05-06 RX ORDER — NEOMYCIN SULFATE, POLYMYXIN B SULFATE, AND DEXAMETHASONE 3.5; 10000; 1 MG/G; [USP'U]/G; MG/G
OINTMENT OPHTHALMIC
COMMUNITY
Start: 2025-04-29

## 2025-05-06 RX ORDER — TIZANIDINE 2 MG/1
2 TABLET ORAL 2 TIMES DAILY PRN
COMMUNITY
Start: 2025-04-08

## 2025-05-06 NOTE — PROGRESS NOTES
VASCULAR SURGERY SERVICE    REFERRING DOCTOR: Dr Lee    CHIEF COMPLAINT:  Carotid stenosis    HISTORY OF PRESENT ILLNESS: Erin Hahn is a 69 y.o. female, right-handed, with insulin-dependent diabetes and CKD 3A, who had a right hemispheric TIA in July 2021 as manifested by 10 minutes of left arm weakness.  She would had no episodes of stroke TIA or amaurosis before after this.  At this time she was on aspirin and statin this has been maintained.  She was subsequently evaluated for a right carotid stent with Dr. Black, but she ultimately declined this procedure.    Notably, the CTA performed that time and suggested a 50-60% carotid stenosis.  Recent carotid duplex shows progression of her right carotid stenosis to greater than 80%.  She is had no further episodes of stroke TIA or amaurosis since.    She states compliance with her aspirin and statin.    She is no history of MI or shortness of breath.  She had a 2D echo at [facility] with normal ejection fraction    4/4/2023:  This is her initial postoperative visit status post right carotid endarterectomy 02/27/2023.  She would an unremarkable postop course was discharged on the 1st postoperative day.  She now returns.  Denies interval stroke TIA or amaurosis.  Notably she is taking aspirin 325 mg as well as her statin    04/16/2024:  This is a 1 year follow-up.  She denies interval stroke TIA or amaurosis.  She states compliance with her aspirin and statin    05/06/2025:  This is another 1 year follow-up.  She denies interval stroke TIA or amaurosis.  She continues to take her aspirin and statin    Past Medical History:   Diagnosis Date    Carotid artery bruit     Diabetes mellitus     High cholesterol     Hypertension     Stage 3a chronic kidney disease 11/3/2021    Stroke 07/2021    TIA       Past Surgical History:   Procedure Laterality Date    CAROTID ENDARTERECTOMY Right 2/27/2023    Procedure: ENDARTERECTOMY-CAROTID;  Surgeon: CAPRICE Martinez  III, MD;  Location: [facility] OR [address] [place] [address];  Service: Peripheral Vascular;  Laterality: Right;    EYE SURGERY Left 03/2022    TONSILLECTOMY           Current Outpatient Medications:     ascorbic acid, vitamin C, (VITAMIN C) 250 MG tablet, Take 1,000 mg by mouth once daily., Disp: , Rfl:     aspirin (ECOTRIN) 81 MG EC tablet, Take 81 mg by mouth once daily., Disp: , Rfl:     b complex vitamins capsule, Take 1 capsule by mouth once daily., Disp: , Rfl:     BASAGLAR KWIKPEN U-100 INSULIN 100 unit/mL (3 mL) InPn pen, Inject 20 Units into the skin once daily. Taking at night., Disp: , Rfl:     brimonidine 0.2% (ALPHAGAN) 0.2 % Drop, 5, Disp: , Rfl:     carvediloL (COREG) 25 MG tablet, Take 1 tablet (25 mg total) by mouth 2 (two) times daily with meals., Disp: 180 tablet, Rfl: 1    co-enzyme Q-10 30 mg capsule, Take 30 mg by mouth 3 (three) times daily., Disp: , Rfl:     dorzolamide-timolol 2-0.5% (COSOPT) 22.3-6.8 mg/mL ophthalmic solution, 30, Disp: , Rfl:     ferrous sulfate (FEOSOL) 325 mg (65 mg iron) Tab tablet, Take 1 tablet (325 mg total) by mouth 2 (two) times daily., Disp: 60 tablet, Rfl: 11    INSULIN DETEMIR (LEVEMIR SUBQ), Inject 17 Units into the skin nightly., Disp: , Rfl:     irbesartan (AVAPRO) 150 MG tablet, TAKE 1 TABLET BY MOUTH EVERY EVENING., Disp: 90 tablet, Rfl: 3    levocarnitine (L-CARNITINE ORAL), Take by mouth., Disp: , Rfl:     magnesium 250 mg Tab, Take 1,500 mg by mouth., Disp: , Rfl:     neomycin-polymyxin-dexamethasone (DEXACINE) 3.5 mg/g-10,000 unit/g-0.1 % Oint, , Disp: , Rfl:     omega-3 fatty acids/fish oil (FISH OIL-OMEGA-3 FATTY ACIDS) 300-1,000 mg capsule, Take by mouth once daily., Disp: , Rfl:     SITagliptan-metformin (JANUMET)  mg per tablet, Take 1 tablet by mouth 2 (two) times daily with meals., Disp: , Rfl:     sodium bicarbonate 650 MG tablet, Take 1 tablet (650 mg total) by mouth 2 (two) times daily., Disp: 180 tablet, Rfl: 3    sodium zirconium cyclosilicate (LOKELMA) 10  gram packet, Take 1 packet (10 g total) by mouth twice a week. Mix entire contents of packet(s) into drinking glass containing 3 tablespoons of water; stir well and drink immediately. Add water and repeat until no powder remains to receive entire dose., Disp: 30 packet, Rfl: 3    tiZANidine (ZANAFLEX) 2 MG tablet, Take 2 mg by mouth 2 (two) times daily as needed., Disp: , Rfl:     vitamin D (VITAMIN D3) 1000 units Tab, Take 1,000 Units by mouth once daily., Disp: , Rfl:     felodipine (PLENDIL) 10 MG 24 hr tablet, Take 1 tablet (10 mg total) by mouth once daily. (Patient taking differently: Take 10 mg by mouth every evening.), Disp: 30 tablet, Rfl: 11    pravastatin (PRAVACHOL) 80 MG tablet, Take 1 tablet (80 mg total) by mouth once daily., Disp: 90 tablet, Rfl: 3    Review of patient's allergies indicates:   Allergen Reactions    Atorvastatin Other (See Comments)     myalgias      Losartan      hyperkalemia       Family History   Problem Relation Name Age of Onset    Diabetes Mother      Hepatitis Mother      No Known Problems Father      No Known Problems Sister      No Known Problems Brother 1     COPD Maternal Aunt      No Known Problems Maternal Uncle      No Known Problems Paternal Aunt      No Known Problems Paternal Uncle      Diabetes Maternal Grandmother      No Known Problems Maternal Grandfather      No Known Problems Paternal Grandmother      No Known Problems Paternal Grandfather      Anemia Neg Hx      Arrhythmia Neg Hx      Asthma Neg Hx      Clotting disorder Neg Hx      Fainting Neg Hx      Heart attack Neg Hx      Heart disease Neg Hx      Heart failure Neg Hx      Hyperlipidemia Neg Hx      Hypertension Neg Hx      Stroke Neg Hx      Atrial Septal Defect Neg Hx         Social History     Tobacco Use    Smoking status: Never    Smokeless tobacco: Never   Substance Use Topics    Alcohol use: No     Alcohol/week: 0.0 standard drinks of alcohol    Drug use: No       PHYSICAL EXAM:   /75 (BP  "Location: Right arm, Patient Position: Sitting)   Pulse 64   Temp 98 °F (36.7 °C) (Oral)   Ht 5' 6" (1.676 m)   Wt 71 kg (156 lb 8.4 oz)   BMI 25.26 kg/m²   Constitutional:  Alert,   Well-appearing  In no distress.   Neurological: Normal speech  no focal findings  CN II - XII grossly intact.  Neuro completely intact   Psychiatric: Mood and affect appropriate and symmetric.   HEENT: Normocephalic / atraumatic  PERRLA  Midline trachea  Well-healed right cervical scar   Cardiac: Regular rate and rhythm.   Pulmonary: Normal pulmonary effort.   Abdomen: Soft, not distended.     Skin: Warm and well perfused.    Vascular:  2+ radial pulses bilaterally   Extremities/  Musculoskeletal: No edema.        IMAGING:  Carotid duplex today reveals no residual carotid stenosis on the right no carotid stenosis on the left.  Stable from prior x2    Carotid duplex 2/2023 revealed a greater than 80% right carotid stenosis with a peak velocity of 452, end-diastolic of 121.  There is no significant left carotid stenosis.    CTA from June 2021 was personally reviewed.  That time she had a calcific right ICA stenosis of at least 60% by my reading.  This was read by Radiology is 50%    IMPRESSION:  2 years status post right carotid endarterectomy doing well    PLAN:    Continue aspirin and statin  Follow-up 24 months with screening carotid duplex     CARLOS Martinez III, MD, FACS  Professor and Chief, Vascular and Endovascular Surgery      CC: Dr Lee          "

## 2025-05-14 DIAGNOSIS — E87.5 HYPERKALEMIA: ICD-10-CM

## 2025-06-05 ENCOUNTER — TELEPHONE (OUTPATIENT)
Dept: NEPHROLOGY | Facility: CLINIC | Age: 70
End: 2025-06-05
Payer: COMMERCIAL

## (undated) DEVICE — DRESSING TELFA STRL 4X3 LF

## (undated) DEVICE — SUT 2-0 12-18IN SILK

## (undated) DEVICE — COVER LIGHT HANDLE 80/CA

## (undated) DEVICE — TOWEL OR DISP STRL BLUE 4/PK

## (undated) DEVICE — ELECTRODE REM PLYHSV RETURN 9

## (undated) DEVICE — SPONGE DERMACEA 4X4IN 12PLY

## (undated) DEVICE — EVACUATOR WOUND BULB 100CC

## (undated) DEVICE — SET DECANTER MEDICHOICE

## (undated) DEVICE — SEE MEDLINE ITEM 157194

## (undated) DEVICE — DRESSING TRANS 4X4 TEGADERM

## (undated) DEVICE — DRAPE T THYROID STERILE

## (undated) DEVICE — CATH ALL PUR URTHL RR 10FR

## (undated) DEVICE — BLADE SCALP OPHTL BEVEL STR

## (undated) DEVICE — SUT PROLENE 6-0 C-1 30IN BL

## (undated) DEVICE — SUT MCRYL PLUS 4-0 PS2 27IN

## (undated) DEVICE — FORCEP STRAIGHT DISP

## (undated) DEVICE — TRAY CATH FOL SIL URIMTR 16FR

## (undated) DEVICE — CORD BIPOLAR 12 FOOT

## (undated) DEVICE — SPONGE GAUZE 16PLY 4X4

## (undated) DEVICE — LOOP VESSEL BLUE MAXI

## (undated) DEVICE — HEMOSTAT SURGICEL 4X8IN

## (undated) DEVICE — SEE MEDLINE ITEM 153688

## (undated) DEVICE — SUT VICRYL 3-0 27 SH

## (undated) DEVICE — INSERTS STEALTH FIBRA SIZE 1.

## (undated) DEVICE — DRAPE STERI INSTRUMENT 1018

## (undated) DEVICE — TRAY PERIPHERAL VASCULAR OMC

## (undated) DEVICE — SUT 3-0 12-18IN SILK

## (undated) DEVICE — APPLICATOR CHLORAPREP ORN 26ML

## (undated) DEVICE — PAD CURAD NONADH 3X4IN

## (undated) DEVICE — SUT 4-0 12-18IN SILK BLACK